# Patient Record
Sex: FEMALE | Race: WHITE | Employment: FULL TIME | URBAN - METROPOLITAN AREA
[De-identification: names, ages, dates, MRNs, and addresses within clinical notes are randomized per-mention and may not be internally consistent; named-entity substitution may affect disease eponyms.]

---

## 2017-01-02 DIAGNOSIS — R59.0 CERVICAL ADENOPATHY: Primary | ICD-10-CM

## 2017-01-02 DIAGNOSIS — R89.6 ABNORMAL CYTOLOGY: ICD-10-CM

## 2017-01-02 DIAGNOSIS — C73 MALIGNANT NEOPLASM OF THYROID GLAND (H): Primary | ICD-10-CM

## 2017-03-13 ENCOUNTER — OFFICE VISIT (OUTPATIENT)
Dept: OTOLARYNGOLOGY | Facility: CLINIC | Age: 42
End: 2017-03-13

## 2017-03-13 VITALS — HEIGHT: 63 IN | WEIGHT: 151 LBS | BODY MASS INDEX: 26.75 KG/M2

## 2017-03-13 DIAGNOSIS — E04.2 NONTOXIC MULTINODULAR GOITER: Primary | ICD-10-CM

## 2017-03-13 ASSESSMENT — PAIN SCALES - GENERAL: PAINLEVEL: NO PAIN (0)

## 2017-03-13 NOTE — MR AVS SNAPSHOT
After Visit Summary   3/13/2017    Michelle Jalloh    MRN: [id number]           Patient Information     Date Of Birth          1975        Visit Information        Provider Department      3/13/2017 2:00 PM Marisa Enamorado MD [facility] Ear Nose and Throat        Today's Diagnoses     Nontoxic multinodular goiter    -  1      Care Instructions    Nurse teaching given on thyroid lobectomy and the patient expresses understanding and acceptance of instructions. Juan A M Ti 3/13/2017 2:49 PM        Follow-ups after your visit        Who to contact     Please call your clinic at [phone] to:    Ask questions about your health    Make or cancel appointments    Discuss your medicines    Learn about your test results    Speak to your doctor   If you have compliments or concerns about an experience at your clinic, or if you wish to file a complaint, please contact [facility] Patient Relations at [phone] or email us at [email or web address]         Additional Information About Your Visit        MyChart Information     [employer] gives you secure access to your electronic health record. If you see a primary care provider, you can also send messages to your care team and make appointments. If you have questions, please call your primary care clinic.  If you do not have a primary care provider, please call [phone] and they will assist you.      [employer] is an electronic gateway that provides easy, online access to your medical records. With [employer], you can request a clinic appointment, read your test results, renew a prescription or communicate with your care team.     To access your existing account, please contact your [facility] or call [phone] for assistance.        Care EveryWhere ID     This is your Care EveryWhere ID. This could be used by other organizations to access your [facility]  "records  [id number]        Your Vitals Were     Height BMI (Body Mass Index)                1.61 m (5' 3.39\") 26.42 kg/m2           Blood Pressure from Last 3 Encounters:   12/20/16 116/75   01/29/16 101/63   08/29/13 108/68    Weight from Last 3 Encounters:   03/13/17 68.5 kg (151 lb)   12/20/16 69.1 kg (152 lb 4.8 oz)   01/29/16 64.9 kg (143 lb)              We Performed the Following     Bhavna-Operative Worksheet (Head & Neck)        Primary Care Provider Office Phone # Fax #    Tana Kam PA-C [phone]       [facility] [address] [facility] [address]        Thank you!     Thank you for choosing [facility] EAR NOSE AND THROAT  for your care. Our goal is always to provide you with excellent care. Hearing back from our patients is one way we can continue to improve our services. Please take a few minutes to complete the written survey that you may receive in the mail after your visit with us. Thank you!             Your Updated Medication List - Protect others around you: Learn how to safely use, store and throw away your medicines at www.disposemymeds.org.          This list is accurate as of: 3/13/17 11:59 PM.  Always use your most recent med list.                   Brand Name Dispense Instructions for use    clobetasol 0.05 % external solution    TEMOVATE     Reported on 3/13/2017         "

## 2017-03-13 NOTE — LETTER
3/13/2017     RE: Michelle Jalloh  [address] [facility] [address]     Dear Colleague,    Thank you for referring your patient, Michelle Jalloh, to the [facility] E[facility] AND THROAT at [facility]. Please see a copy of my visit note below.    REASON FOR CONSULTATION:  I was asked by Dr. Latisha Rdz to see this patient for surgical options for a suspicious thyroid nodule on the thyroid gland suspicious for papillary thyroid carcinoma.      HISTORY OF PRESENT ILLNESS:  This is a 41-year-old female who has a pertinent positive family history for hyperthyroidism.  She has been evaluated in the past by an outside endocrinologist and recently saw Dr. Rdz.  The patient in the past has had a 4-5 mm thyroid nodule that has not biopsied.  On the recent evaluation by Dr. Rdz, the imaging of the ultrasound in 08/2016 identified a 0.5 x 0.4 x 0.4 cm nodule that had irregular borders.  Because of the concern of the borders, the patient had an ultrasound-guided biopsy in 12/2016.  The biopsy of that single nodule was read as suspicious for papillary thyroid carcinoma.  The patient's thyroid function tests from 12/2016 reveal she is euthyroid.  Calcitonin is normal.  She then underwent a lateral neck ultrasound and biopsy of level 2 and level 4 lymph nodes that were benign.      Regarding the thyroid nodule, she has no problems with voice quality, inspiration or swallowing.  She has no head and neck radiation exposure.  No family history of thyroid carcinoma.  She does again have family history of her mother having hyperthyroidism.      PAST MEDICAL HISTORY, SURGICAL HISTORY AND MEDICATIONS:  Have been reviewed and are available in the EMR.      REVIEW OF SYSTEMS:  A 10-point review of systems is pertinent for that noted in the HPI.      PHYSICAL EXAMINATION:  On physical examination, her neck is soft.  I essentially cannot palpate any masses within the thyroid  gland.  The superior and inferior borders of the thyroid gland are palpable with no cervical lymphadenopathy on palpation.      LABORATORY DATA:  As stated above includes a TSH at 1.22.  Ultrasound and fine needle aspirations are discussed in the HPI.      ASSESSMENT:  Right isthmus nodule suspicious for papillary thyroid carcinoma.      PLAN:  Based on this, I would recommend the patient undergo a right thyroid lobectomy and isthmusectomy with intraoperative frozen sections.  If there is carcinoma within this nodule, the options would include proceeding with a completion thyroidectomy versus right thyroid lobectomy and isthmusectomy only.  Because of the size of the nodule, it is reasonable to only perform a lobectomy and include the isthmus with clear margins.  I discussed this at length with the patient.  I do not feel a central neck dissection is necessary.  The risks of the procedure including but not limited to bleeding, infection, injury to the recurrent laryngeal nerve or nerves, potential permanent hypocalcemia were all discussed with the patient.  She will contact my office to schedule surgery accordingly.         DILIP CARLSON MD        D: 2017 15:11   T: 2017 07:57   MT: LUDA    Name:     GRETA BARRAZA   MRN:      [id number]        Account:      [id number]   :      1975           Service Date: 2017    Document: [id number]

## 2017-03-13 NOTE — PATIENT INSTRUCTIONS
Nurse teaching given on thyroid lobectomy and the patient expresses understanding and acceptance of instructions. Juan A Luke 3/13/2017 2:49 PM

## 2017-03-13 NOTE — NURSING NOTE
Chief Complaint   Patient presents with     Consult     possible thyroidectomy     Home Hernández LPN

## 2017-03-13 NOTE — NURSING NOTE
Relevant Diagnosis: thyroid goiter   Teaching Topic: right thyroid lobectomy   Person(s) involved in teaching: Patient     Teaching Concerns Addressed:  Pre op teaching included the need for an H&P, NPO status pre op, hospital routines, expected recovery, activity  restrictions, antimicrobial scrub, s/s of infection, pain control methods and the importance of follow up appointments.  The patient voiced an understanding of all instructions and will call with questions.     Motivation Level:  Asks Questions:   Yes  Eager to Learn:   Yes  Cooperative:   Yes  Receptive (willing/able to accept information):   Yes     Patient  demonstrates understanding of the following:  Reason for the appointment, diagnosis and treatment plan:   Yes  Knowledge of proper use of medications and conditions for which they are ordered (with special attention to potential side effects or drug interactions):   Yes  Which situations necessitate calling provider and whom to contact:   Yes        Proper use and care of  (medical equip, care aids, etc.):   NA  Nutritional needs and diet plan:   Yes  Pain management techniques:   Yes  Patient instructed on hand hygiene:  Yes  How and/when to access community resources:   NA     Infection Prevention:  Patient   demonstrates understanding of the following:  Surgical procedure site care taught   Signs and symptoms of infection taught Yes  Wound care taught Yes     Instructional Materials Used/Given: Pre op booklet.

## 2017-03-22 NOTE — PROGRESS NOTES
REASON FOR CONSULTATION:  I was asked by Dr. Latisha Rdz to see this patient for surgical options for a suspicious thyroid nodule on the thyroid gland suspicious for papillary thyroid carcinoma.      HISTORY OF PRESENT ILLNESS:  This is a 41-year-old female who has a pertinent positive family history for hyperthyroidism.  She has been evaluated in the past by an outside endocrinologist and recently saw Dr. Rdz.  The patient in the past has had a 4-5 mm thyroid nodule that has not biopsied.  On the recent evaluation by Dr. Rdz, the imaging of the ultrasound in 08/2016 identified a 0.5 x 0.4 x 0.4 cm nodule that had irregular borders.  Because of the concern of the borders, the patient had an ultrasound-guided biopsy in 12/2016.  The biopsy of that single nodule was read as suspicious for papillary thyroid carcinoma.  The patient's thyroid function tests from 12/2016 reveal she is euthyroid.  Calcitonin is normal.  She then underwent a lateral neck ultrasound and biopsy of level 2 and level 4 lymph nodes that were benign.      Regarding the thyroid nodule, she has no problems with voice quality, inspiration or swallowing.  She has no head and neck radiation exposure.  No family history of thyroid carcinoma.  She does again have family history of her mother having hyperthyroidism.      PAST MEDICAL HISTORY, SURGICAL HISTORY AND MEDICATIONS:  Have been reviewed and are available in the EMR.      REVIEW OF SYSTEMS:  A 10-point review of systems is pertinent for that noted in the HPI.      PHYSICAL EXAMINATION:  On physical examination, her neck is soft.  I essentially cannot palpate any masses within the thyroid gland.  The superior and inferior borders of the thyroid gland are palpable with no cervical lymphadenopathy on palpation.      LABORATORY DATA:  As stated above includes a TSH at 1.22.  Ultrasound and fine needle aspirations are discussed in the HPI.      ASSESSMENT:  Right isthmus nodule  suspicious for papillary thyroid carcinoma.      PLAN:  Based on this, I would recommend the patient undergo a right thyroid lobectomy and isthmusectomy with intraoperative frozen sections.  If there is carcinoma within this nodule, the options would include proceeding with a completion thyroidectomy versus right thyroid lobectomy and isthmusectomy only.  Because of the size of the nodule, it is reasonable to only perform a lobectomy and include the isthmus with clear margins.  I discussed this at length with the patient.  I do not feel a central neck dissection is necessary.  The risks of the procedure including but not limited to bleeding, infection, injury to the recurrent laryngeal nerve or nerves, potential permanent hypocalcemia were all discussed with the patient.  She will contact my office to schedule surgery accordingly.         DILIP CARLSON MD             D: 2017 15:11   T: 2017 07:57   MT: LUDA      Name:     GRETA BARRAZA   MRN:      [id number]        Account:      [id number]   :      1975           Service Date: 2017      Document: [id number]

## 2017-05-18 NOTE — PROGRESS NOTES
SUBJECTIVE:                                                    Michelle Jalloh is a 42 year old female who presents to clinic today for the following health issues:      Breast lump left breast         Problem list and histories reviewed & adjusted, as indicated.  Additional history: as documented    Patient Active Problem List   Diagnosis     CARDIOVASCULAR SCREENING; LDL GOAL LESS THAN 160     Family history of thyroid disease     Common wart     Lymph node enlargement     Discoid lupus     Nontoxic multinodular goiter     Anxiety state     Past Surgical History:   Procedure Laterality Date     APPENDECTOMY  18      SECTION         Social History   Substance Use Topics     Smoking status: Former Smoker     Start date: 1999     Smokeless tobacco: Never Used     Alcohol use 0.0 oz/week     0 Standard drinks or equivalent per week     Family History   Problem Relation Age of Onset     Hypertension Father      Hyperthyroidism Mother      Thyroid     Hypertension Paternal Grandmother      Colon Cancer No family hx of      Breast Cancer No family hx of      Thyroid Cancer No family hx of          No current outpatient prescriptions on file.     BP Readings from Last 3 Encounters:   17 122/64   16 116/75   16 101/63    Wt Readings from Last 3 Encounters:   17 148 lb (67.1 kg)   17 151 lb (68.5 kg)   16 152 lb 4.8 oz (69.1 kg)                  Labs reviewed in EPIC    Reviewed and updated as needed this visit by clinical staff       Reviewed and updated as needed this visit by Provider         ROS:  This 42 year old female is here today because she has been aware of a left breast mass for about the past 6 weeks. It has gotten a little tender around her ovulation time and again with menses. She has never had a mammogram. She is still nursing her toddler about 3-4 X a day. He will be 3 this summer. Patient is a professor at the [employer]. She and  " are using condoms faithfully.  She doesn't smoke. All other review of systems are negative  Personal, family, and social history reviewed with patient and revised.         OBJECTIVE:                                                    /64 (BP Location: Right arm, Patient Position: Chair, Cuff Size: Adult Regular)  Pulse 66  Temp 97.9  F (36.6  C)  Ht 5' 3.5\" (1.613 m)  Wt 148 lb (67.1 kg)  SpO2 100%  BMI 25.81 kg/m2  Body mass index is 25.81 kg/(m^2).  Right breast and axillary area are normal on exam  Left axillary area is normal  Left breast has a silver dollar sized fullness above her areola at the noon to 1 O'clock position.   It is not tender. This needs further evaluation with imaging and possible biopsy   No redness noted. No dimpling of her breast.   Diagnostic Test Results:  none      ASSESSMENT/PLAN:                                                             1. Lump or mass in breast  As above   - MA Diagnostic Digital Bilateral; Future  - US Breast Left Complete 4 Quadrants; Future    2. Need for prophylactic vaccination and inoculation against viral hepatitis  due  - HEPATITIS A VACCINE (ADULT)    Return to clinic as needed     PATO CASTRO MD  [facility]  "

## 2017-05-24 ENCOUNTER — OFFICE VISIT (OUTPATIENT)
Dept: FAMILY MEDICINE | Facility: CLINIC | Age: 42
End: 2017-05-24
Payer: COMMERCIAL

## 2017-05-24 VITALS
TEMPERATURE: 97.9 F | SYSTOLIC BLOOD PRESSURE: 122 MMHG | OXYGEN SATURATION: 100 % | WEIGHT: 148 LBS | HEART RATE: 66 BPM | BODY MASS INDEX: 25.27 KG/M2 | DIASTOLIC BLOOD PRESSURE: 64 MMHG | HEIGHT: 64 IN

## 2017-05-24 DIAGNOSIS — N63.0 LUMP OR MASS IN BREAST: Primary | ICD-10-CM

## 2017-05-24 DIAGNOSIS — Z23 NEED FOR PROPHYLACTIC VACCINATION AND INOCULATION AGAINST VIRAL HEPATITIS: ICD-10-CM

## 2017-05-24 PROCEDURE — 90632 HEPA VACCINE ADULT IM: CPT | Performed by: FAMILY MEDICINE

## 2017-05-24 PROCEDURE — 90471 IMMUNIZATION ADMIN: CPT | Performed by: FAMILY MEDICINE

## 2017-05-24 PROCEDURE — 99213 OFFICE O/P EST LOW 20 MIN: CPT | Mod: 25 | Performed by: FAMILY MEDICINE

## 2017-05-24 NOTE — PATIENT INSTRUCTIONS
[facility]    If you have any questions regarding to your visit please contact your care team:       Team Purple:   Clinic Hours Telephone Number   [address] Dr.   7am-7pm  Monday - Thursday   7am-5pm  Fridays  [phone]  (Appointment scheduling available 24/7)    Questions about your Visit?   Team Line:  [phone]   Urgent Care - [place] and [facility] - 11am-9pm Monday-Friday Saturday-Sunday- 9am-5pm   [place] - 5pm-9pm Monday-Friday Saturday-Sunday- 9am-5pm  [phone] - [facility]  [phone] - [place]       What options do I have for visits at the clinic other than the traditional office visit?  To expand how we care for you, many of our providers are utilizing electronic visits (e-visits) and telephone visits, when medically appropriate, for interactions with their patients rather than a visit in the clinic.   We also offer nurse visits for many medical concerns. Just like any other service, we will bill your insurance company for this type of visit based on time spent on the phone with your provider. Not all insurance companies cover these visits. Please check with your medical insurance if this type of visit is covered. You will be responsible for any charges that are not paid by your insurance.      E-visits via [employer]:  generally incur a $35.00 fee.  Telephone visits:  Time spent on the phone: *charged based on time that is spent on the phone in increments of 10 minutes. Estimated cost:   5-10 mins $30.00   11-20 mins. $59.00   21-30 mins. $85.00     Use [employer] (secure email communication and access to your chart) to send your primary care provider a message or make an appointment. Ask someone on your Team how to sign up for [employer].  For a Price Quote for your services, please call our Consumer Price Line at [phone].  As always, Thank you for trusting us with your health care  needs!    [facility]    If you have any questions regarding to your visit please contact your care team:       Team Purple:   Clinic Hours Telephone Number   [address] Dr.   7am-7pm  Monday - Thursday   7am-5pm  Fridays  [phone]  (Appointment scheduling available 24/7)    Questions about your Visit?   Team Line:  [phone]   Urgent Care - [place] and [facility] - 11am-9pm Monday-Friday Saturday-Sunday- 9am-5pm   [place] - 5pm-9pm Monday-Friday Saturday-Sunday- 9am-5pm  [phone] - [facility]  [phone] - [place]       What options do I have for visits at the clinic other than the traditional office visit?  To expand how we care for you, many of our providers are utilizing electronic visits (e-visits) and telephone visits, when medically appropriate, for interactions with their patients rather than a visit in the clinic.   We also offer nurse visits for many medical concerns. Just like any other service, we will bill your insurance company for this type of visit based on time spent on the phone with your provider. Not all insurance companies cover these visits. Please check with your medical insurance if this type of visit is covered. You will be responsible for any charges that are not paid by your insurance.      E-visits via [employer]:  generally incur a $35.00 fee.  Telephone visits:  Time spent on the phone: *charged based on time that is spent on the phone in increments of 10 minutes. Estimated cost:   5-10 mins $30.00   11-20 mins. $59.00   21-30 mins. $85.00     Use [employer] (secure email communication and access to your chart) to send your primary care provider a message or make an appointment. Ask someone on your Team how to sign up for [employer].  For a Price Quote for your services, please call our Consumer Price Line at [phone].  As always, Thank you for trusting us with your health care  needs!

## 2017-05-24 NOTE — MR AVS SNAPSHOT
After Visit Summary   5/24/2017    Michelle Jalloh    MRN: [id number]           Patient Information     Date Of Birth          1975        Visit Information        Provider Department      5/24/2017 10:30 AM Mercedez Castanon MD [facility]        Today's Diagnoses     Lump or mass in breast    -  1    Need for prophylactic vaccination and inoculation against viral hepatitis          Care Instructions    [facility]    If you have any questions regarding to your visit please contact your care team:       Team Purple:   Clinic Hours Telephone Number   DAYAMI Ojeda Dr., Dr.   7am-7pm  Monday - Thursday   7am-5pm  Fridays  [phone]  (Appointment scheduling available 24/7)    Questions about your Visit?   Team Line:  [phone]   Urgent Care - [place] - 11am-9pm Monday-Friday Saturday-Sunday- 9am-5pm   [place] - 5pm-9pm Monday-Friday Saturday-Sunday- 9am-5pm  [phone] - [facility]  [phone] - [place]       What options do I have for visits at the clinic other than the traditional office visit?  To expand how we care for you, many of our providers are utilizing electronic visits (e-visits) and telephone visits, when medically appropriate, for interactions with their patients rather than a visit in the clinic.   We also offer nurse visits for many medical concerns. Just like any other service, we will bill your insurance company for this type of visit based on time spent on the phone with your provider. Not all insurance companies cover these visits. Please check with your medical insurance if this type of visit is covered. You will be responsible for any charges that are not paid by your insurance.      E-visits via [employer]:  generally incur a $35.00 fee.  Telephone visits:  Time spent on the phone: *charged based on time that is spent on the phone in increments of 10  minutes. Estimated cost:   5-10 mins $30.00   11-20 mins. $59.00   21-30 mins. $85.00     Use NaPopravkuhart (secure email communication and access to your chart) to send your primary care provider a message or make an appointment. Ask someone on your Team how to sign up for FitBarkt.  For a Price Quote for your services, please call our Consumer Price Line at [phone].  As always, Thank you for trusting us with your health care needs!    [facility]    If you have any questions regarding to your visit please contact your care team:       Team Purple:   Clinic Hours Telephone Number   DAYAMI Ojeda Dr., Dr.   7am-7pm  Monday - Thursday   7am-5pm  Fridays  [phone]  (Appointment scheduling available 24/7)    Questions about your Visit?   Team Line:  [phone]   Urgent Care - [place] and [place] [facility] [place] - 11am-9pm Monday-Friday Saturday-Sunday- 9am-5pm   [place] - 5pm-9pm Monday-Friday Saturday-Sunday- 9am-5pm  [phone] - [place]   [phone] - [place]       What options do I have for visits at the clinic other than the traditional office visit?  To expand how we care for you, many of our providers are utilizing electronic visits (e-visits) and telephone visits, when medically appropriate, for interactions with their patients rather than a visit in the clinic.   We also offer nurse visits for many medical concerns. Just like any other service, we will bill your insurance company for this type of visit based on time spent on the phone with your provider. Not all insurance companies cover these visits. Please check with your medical insurance if this type of visit is covered. You will be responsible for any charges that are not paid by your insurance.      E-visits via Na[employer]uhart:  generally incur a $35.00 fee.  Telephone visits:  Time spent on the phone: *charged based on time that is spent on the phone in increments of 10  minutes. Estimated cost:   5-10 mins $30.00   11-20 mins. $59.00   21-30 mins. $85.00     Use Tinitellhart (secure email communication and access to your chart) to send your primary care provider a message or make an appointment. Ask someone on your Team how to sign up for [employer].  For a Price Quote for your services, please call our [employer] Price Line at [phone].  As always, Thank you for trusting us with your health care needs!              Follow-ups after your visit        Your next 10 appointments already scheduled     Jul 25, 2017   Procedure with Marisa Enamorado MD   [facility] ([facility])    [address] [place] [address]  5th Floor  [facility] [address] [phone]           Located in the Clinics and Surgery Center at [address] [place] [address].   parking is very convenient and highly recommended.  is a $6 flat rate fee.  Both  and self parkers should enter the main arrival plaza from [facility]; parking attendants will direct you based on your parking preference.            Aug 14, 2017  1:30 PM CDT   (Arrive by 1:15 PM)   Return Visit with Marisa Enamorado MD   [facility] ([facility])    [address] [place] [address]  4th Floor  [facility] [address] [phone]              Future tests that were ordered for you today     Open Future Orders        Priority Expected Expires Ordered    [place] Diagnostic Digital Bilateral Routine  8/22/2017 5/24/2017    US Breast Left Complete 4 Quadrants Routine  5/24/2018 5/24/2017            Who to contact     If you have questions or need follow up information about today's clinic visit or your schedule please contact [facility] directly at [phone].  Normal or non-critical lab and imaging results will be communicated to you by Tinitellhart, letter or phone within 4 business days after the clinic has received the results. If you  "do not hear from us within 7 days, please contact the clinic through baseclick or phone. If you have a critical or abnormal lab result, we will notify you by phone as soon as possible.  Submit refill requests through baseclick or call your pharmacy and they will forward the refill request to us. Please allow 3 business days for your refill to be completed.          Additional Information About Your Visit        CommitChangeharApiary Information     baseclick gives you secure access to your electronic health record. If you see a primary care provider, you can also send messages to your care team and make appointments. If you have questions, please call your primary care clinic.  If you do not have a primary care provider, please call [phone] and they will assist you.        Care EveryWhere ID     This is your Care EveryWhere ID. This could be used by other organizations to access your [facility] medical records  [id number]        Your Vitals Were     Pulse Temperature Height Pulse Oximetry BMI (Body Mass Index)       66 97.9  F (36.6  C) 5' 3.5\" (1.613 m) 100% 25.81 kg/m2        Blood Pressure from Last 3 Encounters:   05/24/17 122/64   12/20/16 116/75   01/29/16 101/63    Weight from Last 3 Encounters:   05/24/17 148 lb (67.1 kg)   03/13/17 151 lb (68.5 kg)   12/20/16 152 lb 4.8 oz (69.1 kg)              We Performed the Following     HEPATITIS A VACCINE (ADULT)        Primary Care Provider Office Phone # Fax #    Bijal Jaimes [phone]       [facility] [address] [facility] [address]        Thank you!     Thank you for choosing [facility]  for your care. Our goal is always to provide you with excellent care. Hearing back from our patients is one way we can continue to improve our services. Please take a few minutes to complete the written survey that you may receive in the mail after your visit with us. Thank you!             Your Updated Medication List - Protect others " around you: Learn how to safely use, store and throw away your medicines at www.disposemymeds.org.      Notice  As of 5/24/2017 11:06 AM    You have not been prescribed any medications.

## 2017-05-24 NOTE — NURSING NOTE
"Chief Complaint   Patient presents with     Breast Mass     lump in breast       Initial /64 (BP Location: Right arm, Patient Position: Chair, Cuff Size: Adult Regular)  Pulse 66  Temp 97.9  F (36.6  C)  Ht 5' 3.5\" (1.613 m)  Wt 148 lb (67.1 kg)  SpO2 100%  BMI 25.81 kg/m2 Estimated body mass index is 25.81 kg/(m^2) as calculated from the following:    Height as of this encounter: 5' 3.5\" (1.613 m).    Weight as of this encounter: 148 lb (67.1 kg).  Medication Reconciliation: complete   Theresa Barajas MA      "

## 2017-05-26 ENCOUNTER — RADIANT APPOINTMENT (OUTPATIENT)
Dept: MAMMOGRAPHY | Facility: CLINIC | Age: 42
End: 2017-05-26

## 2017-05-26 DIAGNOSIS — N63.0 LUMP OR MASS IN BREAST: ICD-10-CM

## 2017-06-08 ENCOUNTER — OFFICE VISIT (OUTPATIENT)
Dept: FAMILY MEDICINE | Facility: CLINIC | Age: 42
End: 2017-06-08
Payer: COMMERCIAL

## 2017-06-08 VITALS
HEIGHT: 64 IN | TEMPERATURE: 97.2 F | OXYGEN SATURATION: 99 % | WEIGHT: 148 LBS | BODY MASS INDEX: 25.27 KG/M2 | DIASTOLIC BLOOD PRESSURE: 70 MMHG | HEART RATE: 53 BPM | SYSTOLIC BLOOD PRESSURE: 111 MMHG

## 2017-06-08 DIAGNOSIS — Z01.818 PREOP GENERAL PHYSICAL EXAM: Primary | ICD-10-CM

## 2017-06-08 DIAGNOSIS — E04.2 NONTOXIC MULTINODULAR GOITER: ICD-10-CM

## 2017-06-08 LAB — HGB BLD-MCNC: 13.5 G/DL (ref 11.7–15.7)

## 2017-06-08 PROCEDURE — 85018 HEMOGLOBIN: CPT | Performed by: FAMILY MEDICINE

## 2017-06-08 PROCEDURE — 99214 OFFICE O/P EST MOD 30 MIN: CPT | Performed by: FAMILY MEDICINE

## 2017-06-08 PROCEDURE — 36415 COLL VENOUS BLD VENIPUNCTURE: CPT | Performed by: FAMILY MEDICINE

## 2017-06-08 NOTE — NURSING NOTE
"Chief Complaint   Patient presents with     Pre-Op Exam       Initial /70  Pulse 53  Temp 97.2  F (36.2  C) (Oral)  Ht 5' 3.5\" (1.613 m)  Wt 148 lb (67.1 kg)  LMP 06/03/2017  SpO2 99%  Breastfeeding? No  BMI 25.81 kg/m2 Estimated body mass index is 25.81 kg/(m^2) as calculated from the following:    Height as of this encounter: 5' 3.5\" (1.613 m).    Weight as of this encounter: 148 lb (67.1 kg).  Medication Reconciliation: complete   Nilda BIRMINGHAM MA      "

## 2017-06-08 NOTE — PATIENT INSTRUCTIONS
[facility]    If you have any questions regarding to your visit please contact your care team:       Team Red:   Clinic Hours Telephone Number   Dr. Yina Richmond, NP   7am-7pm  Monday - Thursday   7am-5pm  Fridays  [phone]  (Appointment scheduling available 24/7)    Questions about your visit?   Team Line  [phone]   Urgent Care - [place] - 11am-9pm Monday-Friday Saturday-Sunday- 9am-5pm   [place] - 5pm-9pm Monday-Friday Saturday-Sunday- 9am-5pm  [phone] - [place]   [phone] - [place]       What options do I have for visits at the clinic other than the traditional office visit?  To expand how we care for you, many of our providers are utilizing electronic visits (e-visits) and telephone visits, when medically appropriate, for interactions with their patients rather than a visit in the clinic.   We also offer nurse visits for many medical concerns. Just like any other service, we will bill your insurance company for this type of visit based on time spent on the phone with your provider. Not all insurance companies cover these visits. Please check with your medical insurance if this type of visit is covered. You will be responsible for any charges that are not paid by your insurance.      E-visits via [employer]:  generally incur a $35.00 fee.  Telephone visits:  Time spent on the phone: *charged based on time that is spent on the phone in increments of 10 minutes. Estimated cost:   5-10 mins $30.00   11-20 mins. $59.00   21-30 mins. $85.00     Use AMOtecht (secure email communication and access to your chart) to send your primary care provider a message or make an appointment. Ask someone on your Team how to sign up for [employer].  For a Price Quote for your services, please call our Consumer Price Line at [phone].      As always, Thank you for trusting us with your health care needs!  Lexx ANDERSON  [place]    Before Your Surgery      Call your surgeon if there is any change in your health. This includes signs of a cold or flu (such as a sore throat, runny nose, cough, rash or fever).    Do not smoke, drink alcohol or take over the counter medicine (unless your surgeon or primary care doctor tells you to) for the 24 hours before and after surgery.    If you take prescribed drugs: Follow your doctor s orders about which medicines to take and which to stop until after surgery.    Eating and drinking prior to surgery: follow the instructions from your surgeon    Take a shower or bath the night before surgery. Use the soap your surgeon gave you to gently clean your skin. If you do not have soap from your surgeon, use your regular soap. Do not shave or scrub the surgery site.  Wear clean pajamas and have clean sheets on your bed.

## 2017-06-08 NOTE — PROGRESS NOTES
[facility]  [address] [place] [address]  [phone]  Dept: [phone]    PRE-OP EVALUATION:  Today's date: 2017    Michelle Jalloh (: 1975) presents for pre-operative evaluation assessment as requested by Dr. Marisa Enamorado.  She requires evaluation and anesthesia risk assessment prior to undergoing surgery/procedure for treatment of thyroid .  Proposed procedure: Partial thyroidectomy     Date of Surgery/ Procedure: 17  Time of Surgery/ Procedure: 8:00am  [facility]   Fax number for surgical facility:   Primary Physician: Bijal Jaimes  Type of Anesthesia Anticipated: General    Patient has a Health Care Directive or Living Will:  NO    Preop Questions 2017   1.  Do you have a history of heart attack, stroke, stent, bypass or surgery on an artery in the head, neck, heart or legs? No   2.  Do you ever have any pain or discomfort in your chest? No   3.  Do you have a history of  Heart Failure? No   4.   Are you troubled by shortness of breath when:  walking on a level surface, or up a slight hill, or at night? No   5.  Do you currently have a cold, bronchitis or other respiratory infection? No   6.  Do you have a cough, shortness of breath, or wheezing? No   7.  Do you sometimes get pains in the calves of your legs when you walk? No   8. Do you or anyone in your family have previous history of blood clots? No   9.  Do you or does anyone in your family have a serious bleeding problem such as prolonged bleeding following surgeries or cuts? YES - Father-Low platelets/lymphoma   10. Have you ever had problems with anemia or been told to take iron pills? YES - As teenager   11. Have you had any abnormal blood loss such as black, tarry or bloody stools, or abnormal vaginal bleeding? No   12. Have you ever had a blood transfusion? No   13. Have you or any of your relatives ever had problems with anesthesia? No   14. Do you have sleep apnea,  excessive snoring or daytime drowsiness? No   15. Do you have any prosthetic heart valves? No   16. Do you have prosthetic joints? No   17. Is there any chance that you may be pregnant? No         HPI:                                                      Brief HPI related to upcoming procedure: Pt scheduled For above surgery as she has  suspicious thyroid nodule on the thyroid gland suspicious for papillary thyroid carcinoma.            MEDICAL HISTORY:                                                      Patient Active Problem List    Diagnosis Date Noted     Nontoxic multinodular goiter 2013     Priority: Medium     Lymph node enlargement 2013     Priority: Medium     Discoid lupus 2013     Priority: Medium     Common wart 2012     Priority: Medium     CARDIOVASCULAR SCREENING; LDL GOAL LESS THAN 160 2012     Priority: Medium     Family history of thyroid disease 2012     Priority: Medium     Anxiety state 2009     Priority: Medium      History reviewed. No pertinent past medical history.  Past Surgical History:   Procedure Laterality Date     APPENDECTOMY  18      SECTION       No current outpatient prescriptions on file.     OTC products: None, except as noted above    No Known Allergies   Latex Allergy: NO    Social History   Substance Use Topics     Smoking status: Former Smoker     Start date: 1999     Smokeless tobacco: Never Used     Alcohol use 0.0 oz/week     0 Standard drinks or equivalent per week     History   Drug Use No     Social History     Social History     Marital status:      Spouse name: N/A     Number of children: 1     Years of education: N/A     Occupational History     Artist      Social History Main Topics     Smoking status: Former Smoker     Start date: 1999     Smokeless tobacco: Never Used     Alcohol use 0.0 oz/week     0 Standard drinks or equivalent per week     Drug use: No     Sexual activity: Yes     Partners:  "Male     Other Topics Concern     Parent/Sibling W/ Cabg, Mi Or Angioplasty Before 65f 55m? No     Social History Narrative     Family History   Problem Relation Age of Onset     Hypertension Father      Hyperthyroidism Mother      Thyroid     Hypertension Paternal Grandmother      Colon Cancer No family hx of      Breast Cancer No family hx of      Thyroid Cancer No family hx of          REVIEW OF SYSTEMS:                                                    C: NEGATIVE for fever, chills, change in weight  I: NEGATIVE for worrisome rashes, moles or lesions  E: NEGATIVE for vision changes or irritation  E/M: NEGATIVE for ear, mouth and throat problems  R: NEGATIVE for significant cough or SOB  B: NEGATIVE for masses, tenderness or discharge  CV: NEGATIVE for chest pain, palpitations or peripheral edema  GI: NEGATIVE for nausea, abdominal pain, heartburn, or change in bowel habits  : NEGATIVE for frequency, dysuria, or hematuria  M: NEGATIVE for significant arthralgias or myalgia  N: NEGATIVE for weakness, dizziness or paresthesias  E: NEGATIVE for temperature intolerance, skin/hair changes  H: NEGATIVE for bleeding problems  P: NEGATIVE for changes in mood or affect    EXAM:                                                    /70  Pulse 53  Temp 97.2  F (36.2  C) (Oral)  Ht 5' 3.5\" (1.613 m)  Wt 148 lb (67.1 kg)  LMP 06/03/2017  SpO2 99%  Breastfeeding? No  BMI 25.81 kg/m2    GENERAL APPEARANCE: healthy, alert and no distress     EYES: EOMI, PERRL     HENT: ear canals and TM's normal and nose and mouth without ulcers or lesions     NECK: no adenopathy, no asymmetry, masses, or scars and thyroid normal to palpation     RESP: lungs clear to auscultation - no rales, rhonchi or wheezes     CV: regular rates and rhythm, normal S1 S2, no S3 or S4 and no murmur, click or rub     ABDOMEN:  soft, nontender, no HSM or masses and bowel sounds normal     MS: extremities normal- no gross deformities noted, no " evidence of inflammation in joints, FROM in all extremities.     SKIN: no suspicious lesions or rashes     NEURO: Normal strength and tone, sensory exam grossly normal, mentation intact and speech normal     PSYCH: mentation appears normal. and affect normal/bright     LYMPHATICS: No axillary, cervical, or supraclavicular nodes    DIAGNOSTICS:                                                    HGB pending     Recent Labs   Lab Test  07/08/13   0940   HGB  13.7   PLT  242        IMPRESSION:                                                    Reason for surgery/procedure: as above  Diagnosis/reason for consult: Preop    The proposed surgical procedure is considered INTERMEDIATE risk.    REVISED CARDIAC RISK INDEX  The patient has the following serious cardiovascular risks for perioperative complications such as (MI, PE, VFib and 3  AV Block):  No serious cardiac risks  INTERPRETATION: 0 risks: Class I (very low risk - 0.4% complication rate)    The patient has the following additional risks for perioperative complications:  No identified additional risks      ICD-10-CM    1. Preop general physical exam Z01.818 Hemoglobin   2. Nontoxic multinodular goiter E04.2        RECOMMENDATIONS:                                                        APPROVAL GIVEN to proceed with proposed procedure, without further diagnostic evaluation       Signed Electronically by: Bijal Jaimes MD    Copy of this evaluation report is provided to requesting physician.    [facility] Preop Guidelines

## 2017-06-08 NOTE — MR AVS SNAPSHOT
After Visit Summary   6/8/2017    Michelle Jalloh    MRN: [id number]           Patient Information     Date Of Birth          1975        Visit Information        Provider Department      6/8/2017 3:40 PM Bijal Jaimes MD [facility]        Today's Diagnoses     Preop general physical exam    -  1    Nontoxic multinodular goiter          Care Instructions    [facility]    If you have any questions regarding to your visit please contact your care team:       Team Red:   Clinic Hours Telephone Number   Dr. Yina Richmond, NP   7am-7pm  Monday - Thursday   7am-5pm  Fridays  [phone]  (Appointment scheduling available 24/7)    Questions about your visit?   Team Line  [phone]   [facility] [place] [facility] 11am-9pm Monday-Friday Saturday-Sunday- 9am-5pm   [place] - 5pm-9pm Monday-Friday Saturday-Sunday- 9am-5pm  [phone] - [place]   [phone] - [place]       What options do I have for visits at the clinic other than the traditional office visit?  To expand how we care for you, many of our providers are utilizing electronic visits (e-visits) and telephone visits, when medically appropriate, for interactions with their patients rather than a visit in the clinic.   We also offer nurse visits for many medical concerns. Just like any other service, we will bill your insurance company for this type of visit based on time spent on the phone with your provider. Not all insurance companies cover these visits. Please check with your medical insurance if this type of visit is covered. You will be responsible for any charges that are not paid by your insurance.      E-visits via [email or web address]:  generally incur a $35.00 fee.  Telephone visits:  Time spent on the phone: *charged based on time that is spent on the phone in increments of 10 minutes. Estimated cost:   5-10 mins $30.00   11-20 mins.  $59.00   21-30 mins. $85.00     Use Finelinehart (secure email communication and access to your chart) to send your primary care provider a message or make an appointment. Ask someone on your Team how to sign up for [employer].  For a Price Quote for your services, please call our [employer] Price Line at [phone].      As always, Thank you for trusting us with your health care needs!  Lexx Escalante    Before Your Surgery      Call your surgeon if there is any change in your health. This includes signs of a cold or flu (such as a sore throat, runny nose, cough, rash or fever).    Do not smoke, drink alcohol or take over the counter medicine (unless your surgeon or primary care doctor tells you to) for the 24 hours before and after surgery.    If you take prescribed drugs: Follow your doctor s orders about which medicines to take and which to stop until after surgery.    Eating and drinking prior to surgery: follow the instructions from your surgeon    Take a shower or bath the night before surgery. Use the soap your surgeon gave you to gently clean your skin. If you do not have soap from your surgeon, use your regular soap. Do not shave or scrub the surgery site.  Wear clean pajamas and have clean sheets on your bed.           Follow-ups after your visit        Your next 10 appointments already scheduled     Jun 27, 2017   Procedure with Marisa Enamorado MD   [facility] ([facility])    [address] [place] [address]   [phone]           Located in the Clinics and Surgery Center at [address] [place] [address].   parking is very convenient and highly recommended.  is a $6 flat rate fee.  Both  and self parkers should enter the main arrival plaza from [facility]; parking attendants will direct you based on your parking preference.            Jul 21, 2017 10:00 AM CDT   Return Visit with Marisa Degroot  "MD Kelsea   [facility] ([facility]    [address] [address] [place] [address] [phone]              Who to contact     If you have questions or need follow up information about today's clinic visit or your schedule please contact [facility] directly at [phone].  Normal or non-critical lab and imaging results will be communicated to you by MyChart, letter or phone within 4 business days after the clinic has received the results. If you do not hear from us within 7 days, please contact the clinic through Medabilhart or phone. If you have a critical or abnormal lab result, we will notify you by phone as soon as possible.  Submit refill requests through [employer] or call your pharmacy and they will forward the refill request to us. Please allow 3 business days for your refill to be completed.          Additional Information About Your Visit        Medabilhart Information     [employer] gives you secure access to your electronic health record. If you see a primary care provider, you can also send messages to your care team and make appointments. If you have questions, please call your primary care clinic.  If you do not have a primary care provider, please call [phone] and they will assist you.        Care EveryWhere ID     This is your Care EveryWhere ID. This could be used by other organizations to access your [place] medical records  [id number]        Your Vitals Were     Pulse Temperature Height Last Period Pulse Oximetry Breastfeeding?    53 97.2  F (36.2  C) (Oral) 5' 3.5\" (1.613 m) 06/03/2017 99% No    BMI (Body Mass Index)                   25.81 kg/m2            Blood Pressure from Last 3 Encounters:   06/08/17 111/70   05/24/17 122/64   12/20/16 116/75    Weight from Last 3 Encounters:   06/08/17 148 lb (67.1 kg)   05/24/17 148 lb (67.1 kg)   03/13/17 151 lb (68.5 kg)              We Performed the Following     Hemoglobin        Primary Care " Provider Office Phone # Fax #    Bijal Jaimes [phone]       [address]  [facility] [address]        Thank you!     Thank you for choosing [facility]  for your care. Our goal is always to provide you with excellent care. Hearing back from our patients is one way we can continue to improve our services. Please take a few minutes to complete the written survey that you may receive in the mail after your visit with us. Thank you!             Your Updated Medication List - Protect others around you: Learn how to safely use, store and throw away your medicines at www.disposemymeds.org.      Notice  As of 6/8/2017  4:10 PM    You have not been prescribed any medications.

## 2017-06-09 ENCOUNTER — TELEPHONE (OUTPATIENT)
Dept: SURGERY | Facility: CLINIC | Age: 42
End: 2017-06-09

## 2017-06-09 NOTE — TELEPHONE ENCOUNTER
[facility] Call Center    Phone Message    Name of Caller: Michelle    Phone Number: Cell number on file:    Telephone Information:   Mobile [phone]       Best time to return call: ANY    May a detailed message be left on voicemail: yes    Relation to patient: Self    Reason for Call: Other: Michelle is going into [facility] for labs and is hoping to receive a list of the labs she will need for her procedure, so she does not double up on labs that she will already be having.     Action Taken: Message routed to:  Adult Clinics: Surgery, General p [phone]

## 2017-06-09 NOTE — TELEPHONE ENCOUNTER
Unable to contact the patient by phone. A generic message was left encouraging the patient to contact the surgery scheduler at the [facility] to see what specific labs are needed.    Shirley Huff CMA

## 2017-06-26 ENCOUNTER — ANESTHESIA EVENT (OUTPATIENT)
Dept: SURGERY | Facility: AMBULATORY SURGERY CENTER | Age: 42
End: 2017-06-26

## 2017-06-27 ENCOUNTER — HOSPITAL ENCOUNTER (OUTPATIENT)
Facility: AMBULATORY SURGERY CENTER | Age: 42
End: 2017-06-27
Attending: SURGERY

## 2017-06-27 ENCOUNTER — ANESTHESIA (OUTPATIENT)
Dept: SURGERY | Facility: AMBULATORY SURGERY CENTER | Age: 42
End: 2017-06-27

## 2017-06-27 VITALS
TEMPERATURE: 98 F | BODY MASS INDEX: 24.92 KG/M2 | WEIGHT: 146 LBS | SYSTOLIC BLOOD PRESSURE: 118 MMHG | DIASTOLIC BLOOD PRESSURE: 80 MMHG | OXYGEN SATURATION: 97 % | HEIGHT: 64 IN | HEART RATE: 52 BPM | RESPIRATION RATE: 16 BRPM

## 2017-06-27 DIAGNOSIS — Z98.890 S/P THYROID SURGERY: Primary | ICD-10-CM

## 2017-06-27 LAB
HCG UR QL: NEGATIVE
INTERNAL QC OK POCT: YES

## 2017-06-27 RX ORDER — PROPOFOL 10 MG/ML
INJECTION, EMULSION INTRAVENOUS PRN
Status: DISCONTINUED | OUTPATIENT
Start: 2017-06-27 | End: 2017-06-27

## 2017-06-27 RX ORDER — SODIUM CHLORIDE, SODIUM LACTATE, POTASSIUM CHLORIDE, CALCIUM CHLORIDE 600; 310; 30; 20 MG/100ML; MG/100ML; MG/100ML; MG/100ML
INJECTION, SOLUTION INTRAVENOUS CONTINUOUS
Status: DISCONTINUED | OUTPATIENT
Start: 2017-06-27 | End: 2017-06-28 | Stop reason: HOSPADM

## 2017-06-27 RX ORDER — FENTANYL CITRATE 50 UG/ML
INJECTION, SOLUTION INTRAMUSCULAR; INTRAVENOUS PRN
Status: DISCONTINUED | OUTPATIENT
Start: 2017-06-27 | End: 2017-06-27

## 2017-06-27 RX ORDER — HYDROCODONE BITARTRATE AND ACETAMINOPHEN 5; 325 MG/1; MG/1
1-2 TABLET ORAL EVERY 4 HOURS PRN
Qty: 30 TABLET | Refills: 0 | Status: SHIPPED | OUTPATIENT
Start: 2017-06-27 | End: 2017-07-21

## 2017-06-27 RX ORDER — ONDANSETRON 4 MG/1
4 TABLET, ORALLY DISINTEGRATING ORAL EVERY 30 MIN PRN
Status: DISCONTINUED | OUTPATIENT
Start: 2017-06-27 | End: 2017-06-28 | Stop reason: HOSPADM

## 2017-06-27 RX ORDER — MEPERIDINE HYDROCHLORIDE 25 MG/ML
12.5 INJECTION INTRAMUSCULAR; INTRAVENOUS; SUBCUTANEOUS
Status: DISCONTINUED | OUTPATIENT
Start: 2017-06-27 | End: 2017-06-28 | Stop reason: HOSPADM

## 2017-06-27 RX ORDER — ONDANSETRON 2 MG/ML
INJECTION INTRAMUSCULAR; INTRAVENOUS PRN
Status: DISCONTINUED | OUTPATIENT
Start: 2017-06-27 | End: 2017-06-27

## 2017-06-27 RX ORDER — FENTANYL CITRATE 50 UG/ML
25-50 INJECTION, SOLUTION INTRAMUSCULAR; INTRAVENOUS
Status: DISCONTINUED | OUTPATIENT
Start: 2017-06-27 | End: 2017-06-27 | Stop reason: HOSPADM

## 2017-06-27 RX ORDER — ONDANSETRON 2 MG/ML
4 INJECTION INTRAMUSCULAR; INTRAVENOUS EVERY 30 MIN PRN
Status: DISCONTINUED | OUTPATIENT
Start: 2017-06-27 | End: 2017-06-28 | Stop reason: HOSPADM

## 2017-06-27 RX ORDER — FENTANYL CITRATE 50 UG/ML
25-50 INJECTION, SOLUTION INTRAMUSCULAR; INTRAVENOUS EVERY 5 MIN PRN
Status: DISCONTINUED | OUTPATIENT
Start: 2017-06-27 | End: 2017-06-27 | Stop reason: HOSPADM

## 2017-06-27 RX ORDER — GABAPENTIN 300 MG/1
300 CAPSULE ORAL ONCE
Status: COMPLETED | OUTPATIENT
Start: 2017-06-27 | End: 2017-06-27

## 2017-06-27 RX ORDER — BUPIVACAINE HYDROCHLORIDE 2.5 MG/ML
INJECTION, SOLUTION EPIDURAL; INFILTRATION; INTRACAUDAL PRN
Status: DISCONTINUED | OUTPATIENT
Start: 2017-06-27 | End: 2017-06-27 | Stop reason: HOSPADM

## 2017-06-27 RX ORDER — NALOXONE HYDROCHLORIDE 0.4 MG/ML
.1-.4 INJECTION, SOLUTION INTRAMUSCULAR; INTRAVENOUS; SUBCUTANEOUS
Status: DISCONTINUED | OUTPATIENT
Start: 2017-06-27 | End: 2017-06-28 | Stop reason: HOSPADM

## 2017-06-27 RX ORDER — SODIUM CHLORIDE, SODIUM LACTATE, POTASSIUM CHLORIDE, CALCIUM CHLORIDE 600; 310; 30; 20 MG/100ML; MG/100ML; MG/100ML; MG/100ML
INJECTION, SOLUTION INTRAVENOUS CONTINUOUS
Status: DISCONTINUED | OUTPATIENT
Start: 2017-06-27 | End: 2017-06-27 | Stop reason: HOSPADM

## 2017-06-27 RX ORDER — SODIUM CHLORIDE, SODIUM LACTATE, POTASSIUM CHLORIDE, CALCIUM CHLORIDE 600; 310; 30; 20 MG/100ML; MG/100ML; MG/100ML; MG/100ML
INJECTION, SOLUTION INTRAVENOUS CONTINUOUS PRN
Status: DISCONTINUED | OUTPATIENT
Start: 2017-06-27 | End: 2017-06-27

## 2017-06-27 RX ORDER — PROPOFOL 10 MG/ML
INJECTION, EMULSION INTRAVENOUS CONTINUOUS PRN
Status: DISCONTINUED | OUTPATIENT
Start: 2017-06-27 | End: 2017-06-27

## 2017-06-27 RX ORDER — ACETAMINOPHEN 325 MG/1
975 TABLET ORAL ONCE
Status: COMPLETED | OUTPATIENT
Start: 2017-06-27 | End: 2017-06-27

## 2017-06-27 RX ORDER — LIDOCAINE HYDROCHLORIDE 20 MG/ML
INJECTION, SOLUTION INFILTRATION; PERINEURAL PRN
Status: DISCONTINUED | OUTPATIENT
Start: 2017-06-27 | End: 2017-06-27

## 2017-06-27 RX ORDER — DEXAMETHASONE SODIUM PHOSPHATE 10 MG/ML
INJECTION, SOLUTION INTRAMUSCULAR; INTRAVENOUS PRN
Status: DISCONTINUED | OUTPATIENT
Start: 2017-06-27 | End: 2017-06-27

## 2017-06-27 RX ORDER — GLYCOPYRROLATE 0.2 MG/ML
INJECTION, SOLUTION INTRAMUSCULAR; INTRAVENOUS PRN
Status: DISCONTINUED | OUTPATIENT
Start: 2017-06-27 | End: 2017-06-27

## 2017-06-27 RX ORDER — KETOROLAC TROMETHAMINE 30 MG/ML
INJECTION, SOLUTION INTRAMUSCULAR; INTRAVENOUS PRN
Status: DISCONTINUED | OUTPATIENT
Start: 2017-06-27 | End: 2017-06-27

## 2017-06-27 RX ORDER — LIDOCAINE 40 MG/G
CREAM TOPICAL
Status: DISCONTINUED | OUTPATIENT
Start: 2017-06-27 | End: 2017-06-27 | Stop reason: HOSPADM

## 2017-06-27 RX ADMIN — GABAPENTIN 300 MG: 300 CAPSULE ORAL at 07:10

## 2017-06-27 RX ADMIN — GLYCOPYRROLATE 0.2 MG: 0.2 INJECTION, SOLUTION INTRAMUSCULAR; INTRAVENOUS at 08:35

## 2017-06-27 RX ADMIN — SODIUM CHLORIDE, SODIUM LACTATE, POTASSIUM CHLORIDE, CALCIUM CHLORIDE: 600; 310; 30; 20 INJECTION, SOLUTION INTRAVENOUS at 08:32

## 2017-06-27 RX ADMIN — LIDOCAINE HYDROCHLORIDE 40 MG: 20 INJECTION, SOLUTION INFILTRATION; PERINEURAL at 08:39

## 2017-06-27 RX ADMIN — FENTANYL CITRATE 50 MCG: 50 INJECTION, SOLUTION INTRAMUSCULAR; INTRAVENOUS at 08:35

## 2017-06-27 RX ADMIN — PROPOFOL 200 MG: 10 INJECTION, EMULSION INTRAVENOUS at 08:39

## 2017-06-27 RX ADMIN — ONDANSETRON 4 MG: 2 INJECTION INTRAMUSCULAR; INTRAVENOUS at 09:55

## 2017-06-27 RX ADMIN — PROPOFOL 50 MG: 10 INJECTION, EMULSION INTRAVENOUS at 09:04

## 2017-06-27 RX ADMIN — ACETAMINOPHEN 975 MG: 325 TABLET ORAL at 07:10

## 2017-06-27 RX ADMIN — PROPOFOL 150 MCG/KG/MIN: 10 INJECTION, EMULSION INTRAVENOUS at 08:39

## 2017-06-27 RX ADMIN — SODIUM CHLORIDE, SODIUM LACTATE, POTASSIUM CHLORIDE, CALCIUM CHLORIDE: 600; 310; 30; 20 INJECTION, SOLUTION INTRAVENOUS at 07:31

## 2017-06-27 RX ADMIN — PROPOFOL 100 MG: 10 INJECTION, EMULSION INTRAVENOUS at 08:41

## 2017-06-27 RX ADMIN — KETOROLAC TROMETHAMINE 30 MG: 30 INJECTION, SOLUTION INTRAMUSCULAR; INTRAVENOUS at 10:18

## 2017-06-27 RX ADMIN — PROPOFOL 50 MG: 10 INJECTION, EMULSION INTRAVENOUS at 08:42

## 2017-06-27 RX ADMIN — DEXAMETHASONE SODIUM PHOSPHATE 10 MG: 10 INJECTION, SOLUTION INTRAMUSCULAR; INTRAVENOUS at 08:48

## 2017-06-27 RX ADMIN — FENTANYL CITRATE 50 MCG: 50 INJECTION, SOLUTION INTRAMUSCULAR; INTRAVENOUS at 09:00

## 2017-06-27 NOTE — ANESTHESIA PREPROCEDURE EVALUATION
Anesthesia Evaluation     . Pt has had prior anesthetic.     No history of anesthetic complications          ROS/MED HX    ENT/Pulmonary: Comment: Motion sickness      Neurologic:  - neg neurologic ROS     Cardiovascular:         METS/Exercise Tolerance:     Hematologic:  - neg hematologic  ROS       Musculoskeletal:  - neg musculoskeletal ROS       GI/Hepatic:  - neg GI/hepatic ROS       Renal/Genitourinary:  - ROS Renal section negative       Endo:     (+) thyroid problem .      Psychiatric:  - neg psychiatric ROS       Infectious Disease:  - neg infectious disease ROS       Malignancy:      - no malignancy   Other:                     Physical Exam  Normal systems: cardiovascular and pulmonary    Airway   Mallampati: II  TM distance: >3 FB  Neck ROM: full    Dental     Cardiovascular   Rhythm and rate: regular and normal      Pulmonary    breath sounds clear to auscultation                    Anesthesia Plan      History & Physical Review      ASA Status:  2 .    NPO Status:  > 8 hours    Plan for General and ETT with Intravenous induction. Maintenance will be Balanced.    PONV prophylaxis:  Ondansetron (or other 5HT-3) and Dexamethasone or Solumedrol  Additional equipment: Videolaryngoscope      Postoperative Care  Postoperative pain management:  Multi-modal analgesia.      Consents  Anesthetic plan, risks, benefits and alternatives discussed with:  Patient.  Use of blood products discussed: No .   .        ANESTHESIA PREOP EVALUATION    NPO Status: more then 6 hours    Procedure:     HPI:     PMHx/PSHx/ROS:  PAST MEDICAL HISTORY:   Past Medical History:   Diagnosis Date     Motion sickness        PAST SURGICAL HISTORY:   Past Surgical History:   Procedure Laterality Date     APPENDECTOMY  16      SECTION         FAMILY HISTORY:   Family History   Problem Relation Age of Onset     Hypertension Father      Hyperthyroidism Mother      Thyroid     Hypertension Paternal Grandmother      Colon Cancer No  family hx of      Breast Cancer No family hx of      Thyroid Cancer No family hx of          Past Anes Hx: No personal or family h/o anesthesia problems    Soc Hx:   Tobacco:   EtOH:    Allergies: No Known Allergies    Meds:     (Not in a hospital admission)    No current outpatient prescriptions on file.       Physical Exam:  VS: T 97.5, P 52, /85, R 16, SpO2 98%         BMP:  No results found for: NA   No results found for: POTASSIUM  No results found for: CHLORIDE  No results found for: ABHISHEK  No results found for: CO2  No results found for: BUN  No results found for: CR  Lab Results   Component Value Date    GLC 85 02/24/2016        CBC:  Lab Results   Component Value Date    WBC 4.5 07/08/2013     Lab Results   Component Value Date    HGB 13.5 06/08/2017     Lab Results   Component Value Date    HCT 39.5 07/08/2013     Lab Results   Component Value Date     07/08/2013        Coags/Type and Screen  No results found for: INR  No results found for: PT  Type and Screen:      Oziel Schmid MD    6/27/2017  7:04 AM  Risks, benefits, side effects and intended purposes discussed.                      .

## 2017-06-27 NOTE — BRIEF OP NOTE
[facility]    Brief Operative Note    Pre-operative diagnosis: Thyroid Nodule  Post-operative diagnosis * No post-op diagnosis entered *  Procedure: Procedure(s):  Right Thyroid Lobectomy, Isthmysectomy - Wound Class: I-Clean  Surgeon: Surgeon(s) and Role:     * Marisa Enamorado MD - Primary  Anesthesia: General   Estimated blood loss: 5 cc  Drains: None  Specimens:   ID Type Source Tests Collected by Time Destination   A : Right thyroid lobe and isthmus Tissue Neck SURGICAL PATHOLOGY EXAM Marisa Enamorado MD 6/27/2017  9:01 AM      Findings:   5 mm nodule at right thyroid near the isthmus. No signs of extracapsular extension. Frozen section showed microcarcinoma  Complications: None.  Implants: None.

## 2017-06-27 NOTE — IP AVS SNAPSHOT
[facility]    [address] [place] [address]    Phone:  [phone]    Fax:  [phone]                                       After Visit Summary   6/27/2017    Michelle Jalloh    MRN: [id number]           After Visit Summary Signature Page     I have received my discharge instructions, and my questions have been answered. I have discussed any challenges I see with this plan with the nurse or doctor.    ..........................................................................................................................................  Patient/Patient Representative Signature      ..........................................................................................................................................  Patient Representative Print Name and Relationship to Patient    ..................................................               ................................................  Date                                            Time    ..........................................................................................................................................  Reviewed by Signature/Title    ...................................................              ..............................................  Date                                                            Time

## 2017-06-27 NOTE — IP AVS SNAPSHOT
MRN:[id number]                      After Visit Summary   6/27/2017    Michelle Jalloh    MRN: [id number]           Thank you!     Thank you for choosing [facility] for your care. Our goal is always to provide you with excellent care. Hearing back from our patients is one way we can continue to improve our services. Please take a few minutes to complete the written survey that you may receive in the mail after you visit with us. Thank you!        Patient Information     Date Of Birth          1975        About your hospital stay     You were admitted on:  June 27, 2017 You last received care in the:  [facility]    You were discharged on:  June 27, 2017       Who to Call     For medical emergencies, please call 911.  For non-urgent questions about your medical care, please call your primary care provider or clinic, [phone]  For questions related to your surgery, please call your surgery clinic        Attending Provider     Provider Specialty    Marisa Enamorado MD General Surgery       Primary Care Provider Office Phone # Fax #    Bijal Jaimes [phone]      After Care Instructions     Diet Instructions       Resume pre procedure diet            Discharge Instructions       Patient to follow up with Dr. Enamorado on 7/21 as previously scheduled            Discharge Instructions - Lifting restrictions       Lifting Restrictions 15 pounds until seen at Post-op follow up appointment                  Your next 10 appointments already scheduled     Jul 21, 2017 10:00 AM CDT   [facility]   [facility]    [address] [place] [address] [phone]              Further instructions from your care team       [facility]  Home Care Following Anesthesia  For 24 hours after surgery:  1. Get plenty of rest.  A responsible  adult must stay with you for at least 24 hours after you leave the surgery center.  2. Do not drive or use heavy equipment.  If you have weakness or tingling, don't drive or use heavy equipment until this feeling goes away.   3. Do not drink alcohol.   4. Avoid strenuous or risky activities.  Ask for help when climbing stairs.  5. You may feel lightheaded.  IF so, sit for a few minutes before standing.  Have someone help you get up.   6. If you have nausea (feel sick to your stomach): Drink only clear liquids such as apple juice, ginger ale, broth or 7-Up.  Rest may also help.  Be sure to drink enough fluids.  Move to a regular diet as you feel able.   7. You may have a slight fever.  Call the doctor if your fever is over 100 F (37.7 C) (taken under the tongue) or lasts longer than 24 hours.  8. You may have a dry mouth, a sore throat, muscle aches or trouble sleeping. These should go away after 24 hours.  9. Do not make important or legal decisions.               Tips for taking pain medications  To get the best pain relief possible, remember these points:    Take pain medications as directed, before pain becomes severe.    Pain medication can upset your stomach: taking it with food may help.    Constipation is a common side effect of pain medication. Drink plenty of  fluids.    Eat foods high in fiber. Take a stool softener if recommended by your doctor or pharmacist.    Do not drink alcohol, drive or operate machinery while taking pain medications.    Ask about other ways to control pain, such as with heat, ice or relaxation.    Call a doctor for any of the followin. Signs of infection (fever, growing tenderness at the surgery site, a large amount of drainage or bleeding, severe pain, foul-smelling drainage, redness, swelling).  2. It has been over 8 to 10 hours since surgery and you are still not able to urinate (pass water).  3. Headache for over 24 hours.  4. Numbness, tingling or weakness the day after  "surgery (if you had spinal anesthesia).  Your doctor is:  Dr. Marisa Enamorado, ENT Otolaryngology: [phone]                    Or dial [phone] and ask for the resident on call for:  Prostate Urology  For emergency care, call the:  [place] [facility]:  [phone] (TTY for hearing impaired: [phone])                Pending Results     Date and Time Order Name Status Description    6/27/2017 0936 Surgical pathology exam In process             Admission Information     Date & Time Provider Department Dept. Phone    6/27/2017 Marisa Enamorado MD [facility] [phone]      Your Vitals Were     Blood Pressure Pulse Temperature Respirations Height Weight    126/85 52 97.5  F (36.4  C) (Oral) 16 1.626 m (5' 4\") 66.2 kg (146 lb)    Last Period Pulse Oximetry BMI (Body Mass Index)             06/03/2017 98% 25.06 kg/m2         NuORDER Information     [employer] gives you secure access to your electronic health record. If you see a primary care provider, you can also send messages to your care team and make appointments. If you have questions, please call your primary care clinic.  If you do not have a primary care provider, please call [phone] and they will assist you.      NuORDER is an electronic gateway that provides easy, online access to your medical records. With NuORDER, you can request a clinic appointment, read your test results, renew a prescription or communicate with your care team.     To access your existing account, please contact your [facility] or call [phone] for assistance.        Care EveryWhere ID     This is your Care EveryWhere ID. This could be used by other organizations to access your [place] medical records  [id number]        Equal Access to Services     KEVIN ENCARNACION AH: Latha Kirby, benjie dong, allen fulton, gualberto patel. So wa " [phone].    ATENCIÓN: Si jalen weir, tiene a shepherd disposición servicios gratuitos de asistencia lingüística. Harinder enriquez [phone].    We comply with applicable federal civil rights laws and [place] laws. We do not discriminate on the basis of race, color, national origin, age, disability sex, sexual orientation or gender identity.               Review of your medicines      START taking        Dose / Directions    HYDROcodone-acetaminophen 5-325 MG per tablet   Commonly known as:  NORCO   Used for:  S/P thyroid surgery        Dose:  1-2 tablet   Take 1-2 tablets by mouth every 4 hours as needed for other (Moderate to Severe Pain)   Quantity:  30 tablet   Refills:  0            Where to get your medicines      Some of these will need a paper prescription and others can be bought over the counter. Ask your nurse if you have questions.     Bring a paper prescription for each of these medications     HYDROcodone-acetaminophen 5-325 MG per tablet                Protect others around you: Learn how to safely use, store and throw away your medicines at www.disposemymeds.org.             Medication List: This is a list of all your medications and when to take them. Check marks below indicate your daily home schedule. Keep this list as a reference.      Medications           Morning Afternoon Evening Bedtime As Needed    HYDROcodone-acetaminophen 5-325 MG per tablet   Commonly known as:  NORCO   Take 1-2 tablets by mouth every 4 hours as needed for other (Moderate to Severe Pain)

## 2017-06-27 NOTE — ANESTHESIA CARE TRANSFER NOTE
Patient: Michelle Jalloh    Procedure(s):  Right Thyroid Lobectomy, Isthmysectomy - Wound Class: I-Clean    Diagnosis: Thyroid Nodule  Diagnosis Additional Information: No value filed.    Anesthesia Type:   General, ETT     Note:  Airway :Face Mask  Patient transferred to:PACU  Comments: Arrive PACU, Stable, Airway Intact  124/74, 70,14,100%  All questions answered.        Vitals: (Last set prior to Anesthesia Care Transfer)    CRNA VITALS  6/27/2017 0956 - 6/27/2017 1029      6/27/2017             Pulse: 64    SpO2: 98 %    Resp Rate (observed): 17                Electronically Signed By: EVA Flores CRNA  June 27, 2017  10:29 AM

## 2017-06-27 NOTE — ANESTHESIA POSTPROCEDURE EVALUATION
Patient: Michelle Jalloh    Procedure(s):  Right Thyroid Lobectomy, Isthmysectomy - Wound Class: I-Clean    Diagnosis:Thyroid Nodule  Diagnosis Additional Information: No value filed.    Anesthesia Type:  General, ETT    Note:  Anesthesia Post Evaluation    Patient location during evaluation: PACU  Patient participation: Able to fully participate in evaluation  Level of consciousness: awake  Pain management: adequate  Airway patency: patent  Cardiovascular status: acceptable and stable  Respiratory status: acceptable and room air  Hydration status: acceptable  PONV: none     Anesthetic complications: None          Last vitals:  Vitals:    06/27/17 1030 06/27/17 1045 06/27/17 1100   BP: 111/66 114/81 118/80   Pulse:      Resp: 16 16 16   Temp: 36.1  C (97  F) 36.1  C (97  F) 36.7  C (98  F)   SpO2: 100% 97% 97%         Electronically Signed By: Vern Schmid MD  June 27, 2017  11:08 AM

## 2017-06-27 NOTE — DISCHARGE INSTRUCTIONS
"Discharge Instruction  -- If you have questions for Dr. Enamorado, you can reach her at [phone]  -- If you develop fevers, chills, neck swelling or shortness of breath then notify your doctor. If you have questions or concerns during the day please call ENT clinic at [phone]. After hours you may call [facility] at [phone] and ask for the \"ENT resident on call\".  -- Please do not lift more than 15 lbs or participate in strenuous exercise more than walking up the stairs until your follow up appointment.  -- Keep wound clean. Do not submerge wound under water until seen in your follow up appointment.      [facility]  Home Care Following Anesthesia  For 24 hours after surgery:  1. Get plenty of rest.  A responsible adult must stay with you for at least 24 hours after you leave the surgery center.  2. Do not drive or use heavy equipment.  If you have weakness or tingling, don't drive or use heavy equipment until this feeling goes away.   3. Do not drink alcohol.   4. Avoid strenuous or risky activities.  Ask for help when climbing stairs.  5. You may feel lightheaded.  IF so, sit for a few minutes before standing.  Have someone help you get up.   6. If you have nausea (feel sick to your stomach): Drink only clear liquids such as apple juice, ginger ale, broth or 7-Up.  Rest may also help.  Be sure to drink enough fluids.  Move to a regular diet as you feel able.   7. You may have a slight fever.  Call the doctor if your fever is over 100 F (37.7 C) (taken under the tongue) or lasts longer than 24 hours.  8. You may have a dry mouth, a sore throat, muscle aches or trouble sleeping. These should go away after 24 hours.  9. Do not make important or legal decisions.               Tips for taking pain medications  To get the best pain relief possible, remember these points:    Take pain medications as directed, before pain becomes severe.    Pain medication can upset " your stomach: taking it with food may help.    Constipation is a common side effect of pain medication. Drink plenty of  fluids.    Eat foods high in fiber. Take a stool softener if recommended by your doctor or pharmacist.    Do not drink alcohol, drive or operate machinery while taking pain medications.    Ask about other ways to control pain, such as with heat, ice or relaxation.    Call a doctor for any of the followin. Signs of infection (fever, growing tenderness at the surgery site, a large amount of drainage or bleeding, severe pain, foul-smelling drainage, redness, swelling).  2. It has been over 8 to 10 hours since surgery and you are still not able to urinate (pass water).  3. Headache for over 24 hours.  4. Numbness, tingling or weakness the day after surgery (if you had spinal anesthesia).  Your doctor is:  Dr. Marisa Enamorado, ENT Otolaryngology: [phone]                    Or dial [phone] and ask for the resident on call for:  Prostate Urology  For emergency care, call the:  [facility]:  [phone] (TTY for hearing impaired: [phone])

## 2017-06-29 LAB — COPATH REPORT: NORMAL

## 2017-06-29 NOTE — OP NOTE
Operative Report  June 29, 2017    Pre-op Diagnosis:  Right thyroid nodule  Post-op Diagnosis:   Right thyroid nodule  Procedure:  Right thyroid lobectomy, Isthmysectomy    Surgeons:  MD Renzo Sapp MD    Anesthesia:  GETA  EBL:  5 cc  Drains:   None      Complications:  None     Specimens:    ID Type Source Tests Collected by Time Destination   A : Right thyroid lobe and isthmus Tissue Neck SURGICAL PATHOLOGY EXAM Marisa Enamorado MD 6/27/2017  9:01 AM            Findings:  5 mm nodule at right thyroid near the isthmus. No signs of extracapsular extension. Frozen section showed microcarcinoma    Indications:  Ms. Jalloh is a 42 year old female who had a right thyroid 5x4x4 mm nodule that showed suspicious for papillary thyroid carcinoma on FNA. Her TSH and calcitonin were normal. She also had a lateral neck ultrasound and biopsy of level 2 and level 4 lymph nodes that were benign. After a detailed discussion of risks, benefits and alternatives, she elected to proceed with right thyroid lobectomy, isthmusectomy and possible total thyroidectomy.      Procedure:  After obtained signed informed consent, patient was transferred to the operative room. General anesthesia was induced and patient was intubated orotracheally by anesthesia staff. Patient was placed in a supine position. Patient's neck was preped and draped in the usual sterile fashion. A 3 cm incision was made along a skin crease below the cricoid. The platysma muscle was divided and a supplatysmal plane was developed. The strap muscles were identified and divided at the midline. The thyroid gland was then encountered. We then focus on the right thyroid lobe and carefully dissected it away from the strap muscle and fascia. The superior parathyroid gland was identified and left intact. The superior thyroidal vessels were identified and ligated close to the thyroid capsule. The dissection was then moved inferiorly and the right recurrent  laryngeal nerve was identified next to the cricothyroid joint. The right recurrent laryngeal nerve was further traced inferiorly and protected. The inferior parathyroid gland was identified and left intact. The inferior thyroid vessels were identified and ligated. The right thyroid lobe was then mobilized from the trachea all the way to the thyroid isthmus. The right thyroid lobe and the thyroid isthmus was then transected from the rest of the left thyroid lobe. The specimen was sent for frozen section. Hemostasis was achieved with bipolar cautery. The strap muscle was loosely closed with 3-0 chromic. The platysma muscle was approximated with 3-0 chromic. The frozen section pathology returned to be micro-carcinoma, therefore further completion thyroidectomy was not needed. The skin was closed with 5-0 Monocryl and Swiftset. Patient was turned back to anesthesia staff for waking from anesthesia without difficulties. Dr. Enamorado was present during the entire procedure.

## 2017-07-21 ENCOUNTER — OFFICE VISIT (OUTPATIENT)
Dept: SURGERY | Facility: CLINIC | Age: 42
End: 2017-07-21
Payer: COMMERCIAL

## 2017-07-21 VITALS — DIASTOLIC BLOOD PRESSURE: 72 MMHG | HEART RATE: 45 BPM | SYSTOLIC BLOOD PRESSURE: 108 MMHG

## 2017-07-21 DIAGNOSIS — E89.0 S/P PARTIAL THYROIDECTOMY: Primary | ICD-10-CM

## 2017-07-21 PROCEDURE — 99024 POSTOP FOLLOW-UP VISIT: CPT | Performed by: SURGERY

## 2017-07-21 ASSESSMENT — PAIN SCALES - GENERAL: PAINLEVEL: NO PAIN (0)

## 2017-07-21 NOTE — MR AVS SNAPSHOT
After Visit Summary   7/21/2017    Michelle Jalloh    MRN: [id number]           Patient Information     Date Of Birth          1975        Visit Information        Provider Department      7/21/2017 10:00 AM Marisa Enamorado MD [facility]        Today's Diagnoses     S/P partial thyroidectomy    -  1       Follow-ups after your visit        Who to contact     If you have questions or need follow up information about today's clinic visit or your schedule please contact [facility] directly at [phone].  Normal or non-critical lab and imaging results will be communicated to you by [employer] Partners[employer], letter or phone within 4 business days after the clinic has received the results. If you do not hear from us within 7 days, please contact the clinic through [employer] or phone. If you have a critical or abnormal lab result, we will notify you by phone as soon as possible.  Submit refill requests through BetterWorks or call your pharmacy and they will forward the refill request to us. Please allow 3 business days for your refill to be completed.          Additional Information About Your Visit        MyChart Information     BetterWorks gives you secure access to your electronic health record. If you see a primary care provider, you can also send messages to your care team and make appointments. If you have questions, please call your primary care clinic.  If you do not have a primary care provider, please call [phone] and they will assist you.      BetterWorks is an electronic gateway that provides easy, online access to your medical records. With BetterWorks, you can request a clinic appointment, read your test results, renew a prescription or communicate with your care team.     To access your existing account, please contact your [facility] or call [phone] for assistance.        Care EveryWhere ID     This is your Care EveryWhere ID. This could  be used by other organizations to access your [place] medical records  [id number]        Your Vitals Were     Pulse                   45            Blood Pressure from Last 3 Encounters:   07/21/17 108/72   06/27/17 118/80   06/08/17 111/70    Weight from Last 3 Encounters:   06/27/17 66.2 kg (146 lb)   06/08/17 67.1 kg (148 lb)   05/24/17 67.1 kg (148 lb)              Today, you had the following     No orders found for display       Primary Care Provider Office Phone # Fax #    Bijal Jaimes [phone]       [facility] [address] [facility] [address]        Equal Access to Services     KEVIN ENCARNACION : Hadii damián snowdeno Sodarwin, waaxda luqadaha, qaybta kaalmada adeegyada, gualberto patel. So [facility] [phone].    ATENCIÓN: Si habla español, tiene a shepherd disposición servicios gratuitos de asistencia lingüística. Llame al [phone].    We comply with applicable federal civil rights laws and [place] laws. We do not discriminate on the basis of race, color, national origin, age, disability sex, sexual orientation or gender identity.            Thank you!     Thank you for choosing [facility]  for your care. Our goal is always to provide you with excellent care. Hearing back from our patients is one way we can continue to improve our services. Please take a few minutes to complete the written survey that you may receive in the mail after your visit with us. Thank you!             Your Updated Medication List - Protect others around you: Learn how to safely use, store and throw away your medicines at www.disposemymeds.org.      Notice  As of 7/21/2017 10:58 AM    You have not been prescribed any medications.

## 2017-07-21 NOTE — PROGRESS NOTES
Post op visit s/p right thyroid lobectomy and isthmusectomy. Since the surgery, no problems with voice quality, inspiration or swallowing.    Wound healing well    Pathology reviewed with patient: Microscopic  0.4cm PTC  On the right.     Previous US shows very small cysts on the left.    A/P:  Reviewed with patient wound management and massage  Check TFT's at 6 weeks post op   F/U with Endocrinology

## 2017-07-21 NOTE — NURSING NOTE
"Michelle Jalloh's goals for this visit include:   Chief Complaint   Patient presents with     Surgical Followup     6/27/17 - right thyroid lobectomy isthmysectomy     She requests these members of her care team be copied on today's visit information: YES -     Referring Provider:  Tana Kam PA-C  [facility]  [address] [place] [address]    Initial /72 (BP Location: Left arm, Patient Position: Chair, Cuff Size: Adult Regular)  Pulse (!) 45 Estimated body mass index is 25.06 kg/(m^2) as calculated from the following:    Height as of 6/27/17: 1.626 m (5' 4\").    Weight as of 6/27/17: 66.2 kg (146 lb).  BP completed using cuff size: regular        "

## 2017-07-28 NOTE — OP NOTE
DICTATING PHYSICIAN:  Dr. Enamorado.    PATIENT:  Michelle Jalloh.    MEDICAL RECORD NUMBER:  [id number].    YOB: 1975.    DATE OF SERVICE:  06/27/2017.    PREOPERATIVE DIAGNOSIS:  Right thyroid nodule suspicious for papillary thyroid carcinoma.    POSTOPERATIVE DIAGNOSIS:  Right thyroid micropapillary carcinoma.    PROCEDURE PERFORMED:  1.  Right thyroid lobectomy.  2.  Continuous recurrent laryngeal nerve monitoring for 60 minutes.    ATTENDING SURGEON:  Dr. Enamorado.    ASSISTANT SURGEON:  Dr. Primitivo Terrazas.    INDICATIONS:  Ms. Jalloh is a 42-year-old female with a history of right thyroid nodule measuring approximately 5 x 4 x 4 mm.  An US FNA was obtained and showed to be suspicious for papillary thyroid carcinoma.  Her TSH and calcitonin were otherwise normal.  A detailed discussion of risks, benefits and alternatives of the procedure was held.  The risk of the proposed procedure includes but not limited to bleeding, infection, recurrent laryngeal nerve injury,  potentially permanent hypocalcemia.  The patient agreed with the procedure and signed informed consent.      INTRAOPERATIVE FINDINGS:  Small 5 x 4 mm hard nodule noted in the right thyroid lobe near the isthmus.  There is no microscopic evidence of mass extension out of the thyroid capsule.      PROCEDURE DESCRIPTION:  After properly identifying patient and obtaining signed informed consent, the patient was transferred to operative room.  General anesthesia was induced and patient was intubated orotracheally by anesthesia staff.  The patient was placed in a supine position.  The patient was prepped and draped in the usual sterile fashion.  A time out was carried out immediately prior to the procedure to confirm the identity of the patient and correctness of the procedure.  A 4 cm horizontal incision was made right below the cricoid cartilage.  The platysma muscle was  with Bovie cautery.  A subplatismal plane was developed.  The  midline of the strap muscles were identified and divided at the midline.  The strap muscles were then retracted to the right side to expose the right thyroid gland.  The superior pole of the thyroid gland was first immobilized by blunt dissection.  The superior upper thyroid gland was grasped and retracted inferior-laterally.  The superior thyroid vessels were identified, skeletonised and were clipped and divided with LigaSure as close to the thyroid capsule as possible.  The dissection was then carried further down to the mid portion of the thyroid lobe.  The superior parathyroid gand and laryngeal nerve werre identified and protected.  The dissection was then carried down to the inferior pole of the thyroid gland. The middle thyroid vein was clipped then ligated.  Inferior para thyroid tissue was identified and also left intact.  The inferior thyroid vessels were then divided with clips and LigaSure.  The right thyroid lobe was then mobilized from a lateral to medial direction after trachea.  The thyroid isthmus was then divided with the Ligasure followed with a complete removal of the right thyroid lobe.  After removal of the specimen, the specimen was thoroughly examined in order to have only 1 5 x 4 mm nodule near the thyroid isthmus.  There was no evidence of gross extra capsule involvement by the nodule.  The specimen was then sent down for frozen section. Frozen sectionn Pathology results were consistent with micropapillary carcinoma.  The patient's wound was irrigated with copious amount of normal saline and suctioned out.  The wound was then inspected and noted to have no significant bleeding. Prior to closing, we confirmed that both parathyroid glands were intact and viable. The right recurrent laryngeal nerve was intact visible and with nerve stimulation. We then proceeded with closing.  The strap muscle was closed with 1 simple interrupted stitch with 0 chromic.  The platysmal muscle was closed with 3  simple interrupted sutures with 3-0 chromic.  The skin was closed with 5-0 Monocryl in a subcuticular fashion.  The skin edge was further closed with SwiftSet dermal adhesive.  The patient was then turned back to anesthesia staff and wakened from general anesthesia without difficulties.  Dr. Enamorado was present during the entire procedure.      ESTIMATED BLOOD LOSS:  5 mL.    SPECIMENS:  Right thyroid lobe.    DISPOSITION:  The patient was transferred to PACU area in stable condition.  The patient will be discharged home today with [place] for pain.  The patient will be seen in the clinic for followup by Dr. Enamorado as previously scheduled.

## 2017-08-15 DIAGNOSIS — E04.2 MULTINODULAR GOITER: Primary | ICD-10-CM

## 2017-08-24 DIAGNOSIS — E04.1 THYROID NODULE: Primary | ICD-10-CM

## 2017-08-25 DIAGNOSIS — E04.2 MULTINODULAR GOITER: ICD-10-CM

## 2017-08-25 DIAGNOSIS — E04.1 THYROID NODULE: ICD-10-CM

## 2017-08-25 LAB
DEPRECATED CALCIDIOL+CALCIFEROL SERPL-MC: 38 UG/L (ref 20–75)
T3FREE SERPL-MCNC: 2.7 PG/ML (ref 2.3–4.2)
T4 FREE SERPL-MCNC: 1.01 NG/DL (ref 0.76–1.46)
THYROPEROXIDASE AB SERPL-ACNC: <10 IU/ML
TSH SERPL DL<=0.005 MIU/L-ACNC: 1.7 MU/L (ref 0.4–4)
TSH SERPL DL<=0.005 MIU/L-ACNC: 1.7 MU/L (ref 0.4–4)

## 2017-08-29 LAB
IODINE UR-MCNC: 70 MCG/L (ref 26–705)
ZINC SERPL-MCNC: 76 UG/DL (ref 60–120)

## 2017-09-27 LAB — LAB SCANNED RESULT: NORMAL

## 2017-11-25 ENCOUNTER — TRANSFERRED RECORDS (OUTPATIENT)
Dept: HEALTH INFORMATION MANAGEMENT | Facility: CLINIC | Age: 42
End: 2017-11-25

## 2018-08-07 ENCOUNTER — OFFICE VISIT (OUTPATIENT)
Dept: FAMILY MEDICINE | Facility: CLINIC | Age: 43
End: 2018-08-07
Payer: COMMERCIAL

## 2018-08-07 VITALS
HEIGHT: 64 IN | HEART RATE: 57 BPM | DIASTOLIC BLOOD PRESSURE: 70 MMHG | WEIGHT: 154 LBS | BODY MASS INDEX: 26.29 KG/M2 | OXYGEN SATURATION: 97 % | SYSTOLIC BLOOD PRESSURE: 110 MMHG | RESPIRATION RATE: 15 BRPM | TEMPERATURE: 98.8 F

## 2018-08-07 DIAGNOSIS — N60.22 LUMPY BREASTS, LEFT: Primary | ICD-10-CM

## 2018-08-07 DIAGNOSIS — D23.5 BENIGN NEOPLASM OF SKIN OF TRUNK, EXCEPT SCROTUM: ICD-10-CM

## 2018-08-07 PROCEDURE — 11401 EXC TR-EXT B9+MARG 0.6-1 CM: CPT | Performed by: FAMILY MEDICINE

## 2018-08-07 PROCEDURE — 99213 OFFICE O/P EST LOW 20 MIN: CPT | Mod: 25 | Performed by: FAMILY MEDICINE

## 2018-08-07 PROCEDURE — 88305 TISSUE EXAM BY PATHOLOGIST: CPT | Performed by: FAMILY MEDICINE

## 2018-08-07 NOTE — MR AVS SNAPSHOT
After Visit Summary   8/7/2018    Michelle Jalloh    MRN: [id number]           Patient Information     Date Of Birth          1975        Visit Information        Provider Department      8/7/2018 2:20 PM Bijal Jaimes MD [facility]        Today's Diagnoses     Lumpy breasts, left    -  1    Benign neoplasm of skin of trunk, except scrotum           Follow-ups after your visit        Future tests that were ordered for you today     Open Future Orders        Priority Expected Expires Ordered    MA Diagnostic Digital Bilateral Routine  8/7/2019 8/7/2018            Who to contact     If you have questions or need follow up information about today's clinic visit or your schedule please contact [facility] directly at [phone].  Normal or non-critical lab and imaging results will be communicated to you by [employer], letter or phone within 4 business days after the clinic has received the results. If you do not hear from us within 7 days, please contact the clinic through [employer] or phone. If you have a critical or abnormal lab result, we will notify you by phone as soon as possible.  Submit refill requests through ODIN or call your pharmacy and they will forward the refill request to us. Please allow 3 business days for your refill to be completed.          Additional Information About Your Visit        MyChart Information     [employer]IN gives you secure access to your electronic health record. If you see a primary care provider, you can also send messages to your care team and make appointments. If you have questions, please call your primary care clinic.  If you do not have a primary care provider, please call [phone] and they will assist you.        Care EveryWhere ID     This is your Care EveryWhere ID. This could be used by other organizations to access your [place] medical records  [id number]        Your Vitals Were     Pulse Temperature Respirations Height  "Pulse Oximetry BMI (Body Mass Index)    57 98.8  F (37.1  C) 15 5' 3.5\" (1.613 m) 97% 26.85 kg/m2       Blood Pressure from Last 3 Encounters:   08/07/18 110/70   07/21/17 108/72   06/27/17 118/80    Weight from Last 3 Encounters:   08/07/18 154 lb (69.9 kg)   06/27/17 146 lb (66.2 kg)   06/08/17 148 lb (67.1 kg)              We Performed the Following     EXC BENIGN SKIN LESION TRUNK/ARM/LEG 0.6-1.0 CM     Surgical pathology exam        Primary Care Provider Office Phone # Fax #    Bijal Jaimes [phone] [address] [facility] [address]        Equal Access to Services     [facility] Hospital[facility] : Hadii damián snowdeno Sodarwin, waaxda luqadaha, qaybta kaalmada kirstin, gualberto ingram . So [facility] [phone].    ATENCIÓN: Si habla español, tiene a shepherd disposición servicios gratuitos de asistencia lingüística. [facility] [phone].    We comply with applicable federal civil rights laws and [place] laws. We do not discriminate on the basis of race, color, national origin, age, disability, sex, sexual orientation, or gender identity.            Thank you!     Thank you for choosing [facility]  for your care. Our goal is always to provide you with excellent care. Hearing back from our patients is one way we can continue to improve our services. Please take a few minutes to complete the written survey that you may receive in the mail after your visit with us. Thank you!             Your Updated Medication List - Protect others around you: Learn how to safely use, store and throw away your medicines at www.disposemymeds.org.      Notice  As of 8/7/2018  3:22 PM    You have not been prescribed any medications.      "

## 2018-08-07 NOTE — PROGRESS NOTES
SUBJECTIVE:   Michelle Jalloh is a 43 year old female who presents to clinic today for the following health issues:    Pt says her Breast tend to be Lumpy  And swollen left Breast  No tenderness   LMP 2 weeks ago  No family history of Breast cancer  No drainage Nipple  Pt also has a lesion which she wants Removed Left Breast        Problem list and histories reviewed & adjusted, as indicated.  Additional history: as documented    Patient Active Problem List   Diagnosis     CARDIOVASCULAR SCREENING; LDL GOAL LESS THAN 160     Family history of thyroid disease     Common wart     Lymph node enlargement     Discoid lupus     Nontoxic multinodular goiter     Anxiety state     Past Surgical History:   Procedure Laterality Date     APPENDECTOMY  18      SECTION       THYROIDECTOMY Right 2017    Procedure: THYROIDECTOMY;  Right Thyroid Lobectomy, Isthmysectomy;  Surgeon: Marisa Enamorado MD;  Location:  OR       Social History   Substance Use Topics     Smoking status: Former Smoker     Start date: 1999     Smokeless tobacco: Never Used     Alcohol use 0.0 oz/week     0 Standard drinks or equivalent per week      Comment: 1/week if that.     Family History   Problem Relation Age of Onset     Hypertension Father      Hyperthyroidism Mother      Thyroid     Hypertension Paternal Grandmother      Colon Cancer No family hx of      Breast Cancer No family hx of      Thyroid Cancer No family hx of          No current outpatient prescriptions on file.     No Known Allergies  Recent Labs   Lab Test  17   0955  16   1845  16   1101   LDL   --    --   86   HDL   --    --   78   TRIG   --    --   28   TSH  1.70  1.70  1.60  1.22      BP Readings from Last 3 Encounters:   18 110/70   17 108/72   17 118/80    Wt Readings from Last 3 Encounters:   18 154 lb (69.9 kg)   17 146 lb (66.2 kg)   17 148 lb (67.1 kg)                  Labs reviewed in  "EPIC    Reviewed and updated as needed this visit by clinical staff       Reviewed and updated as needed this visit by Provider         ROS:  CONSTITUTIONAL: NEGATIVE for fever, chills, change in weight  RESP: NEGATIVE for significant cough or SOB  BREAST: as above  CV: NEGATIVE for chest pain, palpitations or peripheral edema  ROS otherwise negative    OBJECTIVE:     /70  Pulse 57  Temp 98.8  F (37.1  C)  Resp 15  Ht 5' 3.5\" (1.613 m)  Wt 154 lb (69.9 kg)  SpO2 97%  BMI 26.85 kg/m2  Body mass index is 26.85 kg/(m^2).  GENERAL: healthy, alert and no distress  NECK: no adenopathy, no asymmetry, masses, or scars and thyroid normal to palpation  RESP: lungs clear to auscultation - no rales, rhonchi or wheezes  BREAST: appear  Fibrocystic  No axillary or supraclavicular adenopathy  CV: regular rate and rhythm, normal S1 S2, no S3 or S4, no murmur, click or rub, no peripheral edema and peripheral pulses strong    Diagnostic Test Results:  none     ASSESSMENT/PLAN:       1. Lumpy breasts, left  Referral done  Fibrocystic V=breast  - MA Diagnostic Digital Bilateral; Future    2. Benign neoplasm of skin of trunk, except scrotum  It has been Irritating her  1 cm mass/benign left Breast area  She wants it removed  - EXC BENIGN SKIN LESION TRUNK/ARM/LEG 0.6-1.0 CM  - Surgical pathology exam  SUBJECTIVE:   Michelle Jalloh is a 43 year old female who presents for lesion removal. We have already discussed this procedure, including option of not performing surgery, technique of surgery and potential for scarring at a recent visit.  PLAN:   After informed consent was obtained, using Betadine for cleansing and 2% Lidocaine with epinephrine for anesthetic, with sterile technique, lesion was excised  And area cauterized. Antibiotic dressing is applied, and wound care instructions provided.  Be alert for any signs of cutaneous infection. The procedure was well tolerated without complications. Follow up: The specimen is " labelled and sent to pathology for evaluation., The patient may return prn..          Bijal Jaimes MD  [facility]

## 2018-08-15 LAB — COPATH REPORT: NORMAL

## 2018-09-06 ENCOUNTER — OFFICE VISIT (OUTPATIENT)
Dept: FAMILY MEDICINE | Facility: CLINIC | Age: 43
End: 2018-09-06
Payer: COMMERCIAL

## 2018-09-06 VITALS
DIASTOLIC BLOOD PRESSURE: 70 MMHG | SYSTOLIC BLOOD PRESSURE: 112 MMHG | OXYGEN SATURATION: 100 % | BODY MASS INDEX: 26.7 KG/M2 | RESPIRATION RATE: 12 BRPM | TEMPERATURE: 97.4 F | HEIGHT: 64 IN | HEART RATE: 53 BPM | WEIGHT: 156.4 LBS

## 2018-09-06 DIAGNOSIS — Z12.4 SCREENING FOR MALIGNANT NEOPLASM OF CERVIX: ICD-10-CM

## 2018-09-06 DIAGNOSIS — Z00.00 ROUTINE GENERAL MEDICAL EXAMINATION AT A HEALTH CARE FACILITY: Primary | ICD-10-CM

## 2018-09-06 DIAGNOSIS — L93.0 DISCOID LUPUS: ICD-10-CM

## 2018-09-06 DIAGNOSIS — E04.2 NONTOXIC MULTINODULAR GOITER: ICD-10-CM

## 2018-09-06 DIAGNOSIS — Z23 NEED FOR PROPHYLACTIC VACCINATION AND INOCULATION AGAINST INFLUENZA: ICD-10-CM

## 2018-09-06 LAB
CHOLEST SERPL-MCNC: 182 MG/DL
GLUCOSE SERPL-MCNC: 92 MG/DL (ref 70–99)
HDLC SERPL-MCNC: 76 MG/DL
LDLC SERPL CALC-MCNC: 95 MG/DL
NONHDLC SERPL-MCNC: 106 MG/DL
TRIGL SERPL-MCNC: 53 MG/DL
TSH SERPL DL<=0.005 MIU/L-ACNC: 2.7 MU/L (ref 0.4–4)

## 2018-09-06 PROCEDURE — 80061 LIPID PANEL: CPT | Performed by: FAMILY MEDICINE

## 2018-09-06 PROCEDURE — 87624 HPV HI-RISK TYP POOLED RSLT: CPT | Performed by: FAMILY MEDICINE

## 2018-09-06 PROCEDURE — 82947 ASSAY GLUCOSE BLOOD QUANT: CPT | Performed by: FAMILY MEDICINE

## 2018-09-06 PROCEDURE — G0145 SCR C/V CYTO,THINLAYER,RESCR: HCPCS | Performed by: FAMILY MEDICINE

## 2018-09-06 PROCEDURE — 84443 ASSAY THYROID STIM HORMONE: CPT | Performed by: FAMILY MEDICINE

## 2018-09-06 PROCEDURE — 90471 IMMUNIZATION ADMIN: CPT | Performed by: FAMILY MEDICINE

## 2018-09-06 PROCEDURE — 90686 IIV4 VACC NO PRSV 0.5 ML IM: CPT | Performed by: FAMILY MEDICINE

## 2018-09-06 PROCEDURE — 36415 COLL VENOUS BLD VENIPUNCTURE: CPT | Performed by: FAMILY MEDICINE

## 2018-09-06 PROCEDURE — 99396 PREV VISIT EST AGE 40-64: CPT | Mod: 25 | Performed by: FAMILY MEDICINE

## 2018-09-06 ASSESSMENT — ENCOUNTER SYMPTOMS
PARESTHESIAS: 0
DIZZINESS: 0
SORE THROAT: 0
PALPITATIONS: 0
HEARTBURN: 0
NAUSEA: 0
EYE PAIN: 0
FREQUENCY: 0
COUGH: 0
DYSURIA: 0
BREAST MASS: 0
CONSTIPATION: 0
CHILLS: 0
WEAKNESS: 0
DIARRHEA: 0
ABDOMINAL PAIN: 0
NERVOUS/ANXIOUS: 0
MYALGIAS: 0
JOINT SWELLING: 0
SHORTNESS OF BREATH: 0
FEVER: 0
ARTHRALGIAS: 0
HEADACHES: 0
HEMATURIA: 0

## 2018-09-06 ASSESSMENT — PAIN SCALES - GENERAL: PAINLEVEL: NO PAIN (0)

## 2018-09-06 NOTE — PATIENT INSTRUCTIONS
[place] Phone number [phone]    Preventive Health Recommendations  Female Ages 40 to 49    Yearly exam:     See your health care provider every year in order to  1. Review health changes.   2. Discuss preventive care.    3. Review your medicines if your doctor prescribed any.      Get a Pap test every three years (unless you have an abnormal result and your provider advises testing more often).      If you get Pap tests with HPV test, you only need to test every 5 years, unless you have an abnormal result. You do not need a Pap test if your uterus was removed (hysterectomy) and you have not had cancer.      You should be tested each year for STDs (sexually transmitted diseases), if you're at risk.     Ask your doctor if you should have a mammogram.      Have a colonoscopy (test for colon cancer) if someone in your family has had colon cancer or polyps before age 50.       Have a cholesterol test every 5 years.       Have a diabetes test (fasting glucose) after age 45. If you are at risk for diabetes, you should have this test every 3 years.    Shots: Get a flu shot each year. Get a tetanus shot every 10 years.     Nutrition:     Eat at least 5 servings of fruits and vegetables each day.    Eat whole-grain bread, whole-wheat pasta and brown rice instead of white grains and rice.    Get adequate Calcium and Vitamin D.      Lifestyle    Exercise at least 150 minutes a week (an average of 30 minutes a day, 5 days a week). This will help you control your weight and prevent disease.    Limit alcohol to one drink per day.    No smoking.     Wear sunscreen to prevent skin cancer.    See your dentist every six months for an exam and cleaning.        Please get Diagnostic mammogram at [facility]  Take care  Bijal Jaimes MD    [facility]    If you have any questions regarding to your visit please contact your care team:       Team Red:   Clinic Hours Telephone Number   Dr. Yina Appiah  Theodore Richmond, NP 7am-7pm  Monday - Thursday   7am-5pm  Fridays  [phone]  (Appointment scheduling available 24/7)   Urgent Care - [place] - 11am-9pm Monday-Friday Saturday-Sunday- 9am-5pm   [place] - 5pm-9pm Monday-Friday Saturday-Sunday- 9am-5pm  [phone] - [place]  [phone] - [place]       What options do I have for a visit other than an office visit? We offer electronic visits (e-visits) and telephone visits, when medically appropriate.  Please check with your medical insurance to see if these types of visits are covered, as you will be responsible for any charges that are not paid by your insurance.      You can use Interview Rocket (secure electronic communication) to access to your chart, send your primary care provider a message, or make an appointment. Ask a team member how to get started.     For a price quote for your services, please call our Consumer Price Line at [phone] or our Imaging Cost estimation line at [phone] (for imaging tests).    Discharged By: An

## 2018-09-06 NOTE — PROGRESS NOTES
SUBJECTIVE:   CC: Michelle Jalloh is an 43 year old woman who presents for preventive health visit.     Physical   Annual:     Getting at least 3 servings of Calcium per day:  Yes    Bi-annual eye exam:  Yes    Dental care twice a year:  NO    Sleep apnea or symptoms of sleep apnea:  None    Diet:  Regular (no restrictions)    Frequency of exercise:  2-3 days/week    Duration of exercise:  Greater than 60 minutes    Taking medications regularly:  Yes    Medication side effects:  None    Additional concerns today:  No            Today's PHQ-2 Score:   PHQ-2 ( 1999 Pfizer) 9/6/2018   Q1: Little interest or pleasure in doing things 0   Q2: Feeling down, depressed or hopeless 0   PHQ-2 Score 0   Q1: Little interest or pleasure in doing things Not at all   Q2: Feeling down, depressed or hopeless Not at all   PHQ-2 Score 0       Abuse: Current or Past(Physical, Sexual or Emotional)- No  Do you feel safe in your environment - Yes    Social History   Substance Use Topics     Smoking status: Former Smoker     Start date: 1/29/1999     Smokeless tobacco: Never Used     Alcohol use 0.0 oz/week     0 Standard drinks or equivalent per week      Comment: 1/week if that.     Alcohol Use 9/6/2018   If you drink alcohol do you typically have greater than 3 drinks per day OR greater than 7 drinks per week? No       Reviewed orders with patient.  Reviewed health maintenance and updated orders accordingly - Yes  Labs reviewed in EPIC  BP Readings from Last 3 Encounters:   09/06/18 112/70   08/07/18 110/70   07/21/17 108/72    Wt Readings from Last 3 Encounters:   09/06/18 156 lb 6.4 oz (70.9 kg)   08/07/18 154 lb (69.9 kg)   06/27/17 146 lb (66.2 kg)                  Patient Active Problem List   Diagnosis     CARDIOVASCULAR SCREENING; LDL GOAL LESS THAN 160     Family history of thyroid disease     Common wart     Lymph node enlargement     Discoid lupus     Nontoxic multinodular goiter     Anxiety state     Past Surgical History:    Procedure Laterality Date     APPENDECTOMY  18      SECTION       THYROIDECTOMY Right 2017    Procedure: THYROIDECTOMY;  Right Thyroid Lobectomy, Isthmysectomy;  Surgeon: Marisa Enamorado MD;  Location:  OR       Social History   Substance Use Topics     Smoking status: Former Smoker     Start date: 1999     Smokeless tobacco: Never Used     Alcohol use 0.0 oz/week     0 Standard drinks or equivalent per week      Comment: 1/week if that.     Family History   Problem Relation Age of Onset     Hypertension Father      Hyperthyroidism Mother      Thyroid     Hypertension Paternal Grandmother      Diabetes Paternal Grandfather      Colon Cancer No family hx of      Breast Cancer No family hx of      Thyroid Cancer No family hx of          No current outpatient prescriptions on file.     No Known Allergies  Recent Labs   Lab Test  17   0955  16   1845  16   1101   LDL   --    --   86   HDL   --    --   78   TRIG   --    --   28   TSH  1.70  1.70  1.60  1.22        Patient over age 50, mutual decision to screen reflected in health maintenance.    Pertinent mammograms are reviewed under the imaging tab.  History of abnormal Pap smear: NO - age 30- 65 PAP every 3 years recommended  PAP / HPV 2016   PAP NIL NIL     Reviewed and updated as needed this visit by clinical staff  Tobacco  Allergies  Meds  Fam Hx  Soc Hx        Reviewed and updated as needed this visit by Provider  Allergies        Past Medical History:   Diagnosis Date     Motion sickness       Past Surgical History:   Procedure Laterality Date     APPENDECTOMY  18      SECTION       THYROIDECTOMY Right 2017    Procedure: THYROIDECTOMY;  Right Thyroid Lobectomy, Isthmysectomy;  Surgeon: Marisa Enamorado MD;  Location:  OR       Review of Systems   Constitutional: Negative for chills and fever.   HENT: Negative for ear pain, hearing loss and sore throat.    Eyes: Negative for  "pain and visual disturbance.   Respiratory: Negative for cough and shortness of breath.    Cardiovascular: Negative for palpitations and peripheral edema.   Gastrointestinal: Negative for abdominal pain, constipation, diarrhea, heartburn and nausea.   Breasts:  Negative for tenderness, breast mass and discharge.   Genitourinary: Negative for dysuria, frequency, genital sores, hematuria, pelvic pain, urgency, vaginal bleeding and vaginal discharge.   Musculoskeletal: Negative for arthralgias, joint swelling and myalgias.   Skin: Negative for rash.   Neurological: Negative for dizziness, weakness, headaches and paresthesias.   Psychiatric/Behavioral: Negative for mood changes. The patient is not nervous/anxious.      CONSTITUTIONAL: NEGATIVE for fever, chills, change in weight  INTEGUMENTARU/SKIN: NEGATIVE for worrisome rashes, moles or lesions  EYES: NEGATIVE for vision changes or irritation  ENT: NEGATIVE for ear, mouth and throat problems  RESP: NEGATIVE for significant cough or SOB  BREAST: NEGATIVE for masses, tenderness or discharge  CV: NEGATIVE for chest pain, palpitations or peripheral edema  GI: NEGATIVE for nausea, abdominal pain, heartburn, or change in bowel habits  : NEGATIVE for unusual urinary or vaginal symptoms. Periods are regular.  MUSCULOSKELETAL: NEGATIVE for significant arthralgias or myalgia  NEURO: NEGATIVE for weakness, dizziness or paresthesias  PSYCHIATRIC: NEGATIVE for changes in mood or affect     OBJECTIVE:   /70  Pulse 53  Temp 97.4  F (36.3  C) (Oral)  Resp 12  Ht 5' 4.02\" (1.626 m)  Wt 156 lb 6.4 oz (70.9 kg)  SpO2 100%  BMI 26.83 kg/m2  Physical Exam  GENERAL: healthy, alert and no distress  EYES: Eyes grossly normal to inspection, PERRL and conjunctivae and sclerae normal  HENT: ear canals and TM's normal, nose and mouth without ulcers or lesions  NECK: no adenopathy, no asymmetry, masses, or scars and thyroid normal to palpation  RESP: lungs clear to auscultation - " no rales, rhonchi or wheezes  BREAST: normal without masses, tenderness or nipple discharge and no palpable axillary masses or adenopathy  CV: regular rate and rhythm, normal S1 S2, no S3 or S4, no murmur, click or rub, no peripheral edema and peripheral pulses strong  ABDOMEN: soft, nontender, no hepatosplenomegaly, no masses and bowel sounds normal   (female): normal female external genitalia, normal urethral meatus, vaginal mucosa pink, moist, well rugated, and normal cervix/adnexa/uterus without masses or discharge  MS: no gross musculoskeletal defects noted, no edema  SKIN: no suspicious lesions or rashes  NEURO: Normal strength and tone, mentation intact and speech normal  PSYCH: mentation appears normal, affect normal/bright    Diagnostic Test Results:  Pending     ASSESSMENT/PLAN:   1. Routine general medical examination at a health care facility  Normal Exam  - Glucose  - Lipid panel reflex to direct LDL Fasting    2. Screening for malignant neoplasm of cervix  papdone  - Pap imaged thin layer screen with HPV - recommended age 30 - 65 years (select HPV order below)  - HPV High Risk Types DNA Cervical    3. Nontoxic multinodular goiter  Pt sees Endocrine  - TSH with free T4 reflex    4. Discoid lupus  Stable     5. Need for prophylactic vaccination and inoculation against influenza  Advised   - FLU VACCINE, SPLIT VIRUS, IM (QUADRIVALENT) [90019]- >3 YRS  - Vaccine Administration, Initial [65734]    COUNSELING:  Reviewed preventive health counseling, as reflected in patient instructions       Regular exercise       Healthy diet/nutrition       Immunizations    Vaccinated for: Influenza             Contraception       Osteoporosis Prevention/Bone Health       Colon cancer screening       The 10-year ASCVD risk score (Mauradrake SONG Jr, et al., 2013) is: 0.3%    Values used to calculate the score:      Age: 43 years      Sex: Female      Is Non- : No      Diabetic: No      Tobacco smoker:  "No      Systolic Blood Pressure: 112 mmHg      Is BP treated: No      HDL Cholesterol: 78 mg/dL      Total Cholesterol: 170 mg/dL    BP Readings from Last 1 Encounters:   09/06/18 112/70     Estimated body mass index is 26.83 kg/(m^2) as calculated from the following:    Height as of this encounter: 5' 4.02\" (1.626 m).    Weight as of this encounter: 156 lb 6.4 oz (70.9 kg).      Weight management plan: Exercise/low wilber diet     reports that she has quit smoking. She started smoking about 19 years ago. She has never used smokeless tobacco.      Counseling Resources:  ATP IV Guidelines  Pooled Cohorts Equation Calculator  Breast Cancer Risk Calculator  FRAX Risk Assessment  ICSI Preventive Guidelines  Dietary Guidelines for Americans, 2010  USDA's MyPlate  ASA Prophylaxis  Lung CA Screening    Bijal Jaimes MD  [facility]  "

## 2018-09-06 NOTE — MR AVS SNAPSHOT
After Visit Summary   9/6/2018    Michelle Jalloh    MRN: [id number]           Patient Information     Date Of Birth          1975        Visit Information        Provider Department      9/6/2018 9:00 AM Bijal Jaimes MD [facility]        Today's Diagnoses     Routine general medical examination at a health care facility    -  1    Screening for malignant neoplasm of cervix        Nontoxic multinodular goiter        Discoid lupus          Care Instructions    [place] Phone number [phone]    Preventive Health Recommendations  Female Ages 40 to 49    Yearly exam:     See your health care provider every year in order to  1. Review health changes.   2. Discuss preventive care.    3. Review your medicines if your doctor prescribed any.      Get a Pap test every three years (unless you have an abnormal result and your provider advises testing more often).      If you get Pap tests with HPV test, you only need to test every 5 years, unless you have an abnormal result. You do not need a Pap test if your uterus was removed (hysterectomy) and you have not had cancer.      You should be tested each year for STDs (sexually transmitted diseases), if you're at risk.     Ask your doctor if you should have a mammogram.      Have a colonoscopy (test for colon cancer) if someone in your family has had colon cancer or polyps before age 50.       Have a cholesterol test every 5 years.       Have a diabetes test (fasting glucose) after age 45. If you are at risk for diabetes, you should have this test every 3 years.    Shots: Get a flu shot each year. Get a tetanus shot every 10 years.     Nutrition:     Eat at least 5 servings of fruits and vegetables each day.    Eat whole-grain bread, whole-wheat pasta and brown rice instead of white grains and rice.    Get adequate Calcium and Vitamin D.      Lifestyle    Exercise at least 150 minutes a week (an average of 30 minutes a day, 5 days a week).  This will help you control your weight and prevent disease.    Limit alcohol to one drink per day.    No smoking.     Wear sunscreen to prevent skin cancer.    See your dentist every six months for an exam and cleaning.        Please get Diagnostic mammogram at [facility] care  Bijal Jaimes MD    [facility]    If you have any questions regarding to your visit please contact your care team:       Team Red:   Clinic Hours Telephone Number   Dr. Yina Richmond NP 7am-7pm  Monday - Thursday   7am-5pm  Fridays  [phone]  (Appointment scheduling available 24/7)   Urgent Care - [place] and [facility] - 11am-9pm Monday-Friday Saturday-Sunday- 9am-5pm   [place] - 5pm-9pm Monday-Friday Saturday-Sunday- 9am-5pm  [phone] - [place]  [phone] - [place]       What options do I have for a visit other than an office visit? We offer electronic visits (e-visits) and telephone visits, when medically appropriate.  Please check with your medical insurance to see if these types of visits are covered, as you will be responsible for any charges that are not paid by your insurance.      You can use Prestigos (secure electronic communication) to access to your chart, send your primary care provider a message, or make an appointment. Ask a team member how to get started.     For a price quote for your services, please call our Consumer Price Line at [phone] or our Imaging Cost estimation line at [phone] (for imaging tests).    Discharged By: An              Follow-ups after your visit        Who to contact     If you have questions or need follow up information about today's clinic visit or your schedule please contact [facility] directly at [phone].  Normal or non-critical lab and imaging results will be communicated to you by [email or web address], letter or phone within 4 business days after the clinic has received the  "results. If you do not hear from us within 7 days, please contact the clinic through AMEE or phone. If you have a critical or abnormal lab result, we will notify you by phone as soon as possible.  Submit refill requests through AMEE or call your pharmacy and they will forward the refill request to us. Please allow 3 business days for your refill to be completed.          Additional Information About Your Visit        AMEE Information     AMEE gives you secure access to your electronic health record. If you see a primary care provider, you can also send messages to your care team and make appointments. If you have questions, please call your primary care clinic.  If you do not have a primary care provider, please call [phone] and they will assist you.        Care EveryWhere ID     This is your Care EveryWhere ID. This could be used by other organizations to access your [facility] medical records  [id number]        Your Vitals Were     Pulse Temperature Respirations Height Pulse Oximetry BMI (Body Mass Index)    53 97.4  F (36.3  C) (Oral) 12 5' 4.02\" (1.626 m) 100% 26.83 kg/m2       Blood Pressure from Last 3 Encounters:   09/06/18 112/70   08/07/18 110/70   07/21/17 108/72    Weight from Last 3 Encounters:   09/06/18 156 lb 6.4 oz (70.9 kg)   08/07/18 154 lb (69.9 kg)   06/27/17 146 lb (66.2 kg)              We Performed the Following     Glucose     HPV High Risk Types DNA Cervical     Lipid panel reflex to direct LDL Fasting     Pap imaged thin layer screen with HPV - recommended age 30 - 65 years (select HPV order below)     TSH with free T4 reflex        Primary Care Provider Office Phone # Fax #    Bijal Jaimes [phone]       [address] [facility] [place] [address]        Equal Access to Services     ANDERSON ENCARNACION : Latha Kirby, wagalileo dong, qaybta kagualberto maynard. So [facility] [phone].    ATENCIÓN: Si habla " español, tiene a shepherd disposición servicios gratuitos de asistencia lingüística. Harinder enriquez [phone].    We comply with applicable federal civil rights laws and [place] laws. We do not discriminate on the basis of race, color, national origin, age, disability, sex, sexual orientation, or gender identity.            Thank you!     Thank you for choosing [facility]  for your care. Our goal is always to provide you with excellent care. Hearing back from our patients is one way we can continue to improve our services. Please take a few minutes to complete the written survey that you may receive in the mail after your visit with us. Thank you!             Your Updated Medication List - Protect others around you: Learn how to safely use, store and throw away your medicines at www.disposemymeds.org.      Notice  As of 9/6/2018  9:29 AM    You have not been prescribed any medications.

## 2018-09-06 NOTE — LETTER
[address] [place] [address]    September 6, 2018    [facility]  [address] [facility] [address]          Dear Michelle,  Cholesterol and Thyroid is normal.  Blood sugar is normal.  Enclosed is a copy of your results.     Results for orders placed or performed in visit on 09/06/18   Glucose   Result Value Ref Range    Glucose 92 70 - 99 mg/dL   Lipid panel reflex to direct LDL Fasting   Result Value Ref Range    Cholesterol 182 <200 mg/dL    Triglycerides 53 <150 mg/dL    HDL Cholesterol 76 >49 mg/dL    LDL Cholesterol Calculated 95 <100 mg/dL    Non HDL Cholesterol 106 <130 mg/dL   TSH with free T4 reflex   Result Value Ref Range    TSH 2.70 0.40 - 4.00 mU/L       If you have any questions or concerns, please call myself or my nurse at [phone].      Sincerely,        Bijal Jaimes MD/pb

## 2018-09-06 NOTE — PROGRESS NOTES

## 2018-09-10 ENCOUNTER — HEALTH MAINTENANCE LETTER (OUTPATIENT)
Age: 43
End: 2018-09-10

## 2018-09-10 LAB
COPATH REPORT: NORMAL
PAP: NORMAL

## 2018-09-12 LAB
FINAL DIAGNOSIS: NORMAL
HPV HR 12 DNA CVX QL NAA+PROBE: NEGATIVE
HPV16 DNA SPEC QL NAA+PROBE: NEGATIVE
HPV18 DNA SPEC QL NAA+PROBE: NEGATIVE
SPECIMEN DESCRIPTION: NORMAL
SPECIMEN SOURCE CVX/VAG CYTO: NORMAL

## 2018-09-25 ENCOUNTER — RADIANT APPOINTMENT (OUTPATIENT)
Dept: MAMMOGRAPHY | Facility: CLINIC | Age: 43
End: 2018-09-25
Attending: FAMILY MEDICINE
Payer: COMMERCIAL

## 2018-09-25 DIAGNOSIS — N60.22 LUMPY BREASTS, LEFT: ICD-10-CM

## 2019-10-28 ENCOUNTER — ANCILLARY PROCEDURE (OUTPATIENT)
Dept: MAMMOGRAPHY | Facility: CLINIC | Age: 44
End: 2019-10-28
Attending: FAMILY MEDICINE
Payer: COMMERCIAL

## 2019-10-28 ENCOUNTER — TRANSFERRED RECORDS (OUTPATIENT)
Dept: HEALTH INFORMATION MANAGEMENT | Facility: CLINIC | Age: 44
End: 2019-10-28

## 2019-10-28 DIAGNOSIS — Z12.31 VISIT FOR SCREENING MAMMOGRAM: ICD-10-CM

## 2019-11-03 ASSESSMENT — ENCOUNTER SYMPTOMS
DYSURIA: 0
CHILLS: 0
DIZZINESS: 0
BREAST MASS: 0
COUGH: 0
HEADACHES: 0
FREQUENCY: 0
HEARTBURN: 0
DIARRHEA: 0
NAUSEA: 0
PALPITATIONS: 0
WEAKNESS: 0
ABDOMINAL PAIN: 0
SORE THROAT: 0
MYALGIAS: 0
HEMATURIA: 0
HEMATOCHEZIA: 0
JOINT SWELLING: 0
PARESTHESIAS: 0
CONSTIPATION: 0
NERVOUS/ANXIOUS: 0
SHORTNESS OF BREATH: 0
EYE PAIN: 0
ARTHRALGIAS: 0
FEVER: 0

## 2019-11-05 ENCOUNTER — ANCILLARY PROCEDURE (OUTPATIENT)
Dept: MAMMOGRAPHY | Facility: CLINIC | Age: 44
End: 2019-11-05
Attending: FAMILY MEDICINE
Payer: COMMERCIAL

## 2019-11-05 DIAGNOSIS — R92.8 ABNORMAL MAMMOGRAM OF RIGHT BREAST: ICD-10-CM

## 2019-11-06 ENCOUNTER — OFFICE VISIT (OUTPATIENT)
Dept: FAMILY MEDICINE | Facility: CLINIC | Age: 44
End: 2019-11-06
Payer: COMMERCIAL

## 2019-11-06 VITALS
DIASTOLIC BLOOD PRESSURE: 78 MMHG | TEMPERATURE: 97.8 F | BODY MASS INDEX: 27.35 KG/M2 | RESPIRATION RATE: 16 BRPM | WEIGHT: 160.2 LBS | SYSTOLIC BLOOD PRESSURE: 118 MMHG | HEART RATE: 52 BPM | OXYGEN SATURATION: 99 % | HEIGHT: 64 IN

## 2019-11-06 DIAGNOSIS — Z00.00 ROUTINE HISTORY AND PHYSICAL EXAMINATION OF ADULT: ICD-10-CM

## 2019-11-06 DIAGNOSIS — Z12.11 SPECIAL SCREENING FOR MALIGNANT NEOPLASMS, COLON: ICD-10-CM

## 2019-11-06 DIAGNOSIS — L93.0 DISCOID LUPUS: ICD-10-CM

## 2019-11-06 DIAGNOSIS — E04.2 NONTOXIC MULTINODULAR GOITER: ICD-10-CM

## 2019-11-06 DIAGNOSIS — Z83.49 FAMILY HISTORY OF THYROID DISEASE: Primary | ICD-10-CM

## 2019-11-06 LAB
GLUCOSE SERPL-MCNC: 87 MG/DL (ref 70–99)
TSH SERPL DL<=0.005 MIU/L-ACNC: 1.54 MU/L (ref 0.4–4)

## 2019-11-06 PROCEDURE — 99396 PREV VISIT EST AGE 40-64: CPT | Performed by: FAMILY MEDICINE

## 2019-11-06 PROCEDURE — 84443 ASSAY THYROID STIM HORMONE: CPT | Performed by: FAMILY MEDICINE

## 2019-11-06 PROCEDURE — 82947 ASSAY GLUCOSE BLOOD QUANT: CPT | Performed by: FAMILY MEDICINE

## 2019-11-06 PROCEDURE — 36415 COLL VENOUS BLD VENIPUNCTURE: CPT | Performed by: FAMILY MEDICINE

## 2019-11-06 ASSESSMENT — ENCOUNTER SYMPTOMS
COUGH: 0
HEARTBURN: 0
NAUSEA: 0
MYALGIAS: 0
DYSURIA: 0
EYE PAIN: 0
HEMATURIA: 0
CONSTIPATION: 0
PARESTHESIAS: 0
FREQUENCY: 0
CHILLS: 0
HEMATOCHEZIA: 0
DIARRHEA: 0
NERVOUS/ANXIOUS: 0
WEAKNESS: 0
HEADACHES: 0
FEVER: 0
SORE THROAT: 0
PALPITATIONS: 0
ABDOMINAL PAIN: 0
SHORTNESS OF BREATH: 0
DIZZINESS: 0
JOINT SWELLING: 0
ARTHRALGIAS: 0
BREAST MASS: 0

## 2019-11-06 ASSESSMENT — MIFFLIN-ST. JEOR: SCORE: 1351.66

## 2019-11-06 NOTE — LETTER
[address] [place] [address]    November 6, 2019    Michelle [facility]  [address] [facility] [facility] [address]      Dear Michelle,    Thyroid and Blood sugar are normal     Enclosed is a copy of your results.   Results for orders placed or performed in visit on 11/06/19   TSH with free T4 reflex     Status: None   Result Value Ref Range    TSH 1.54 0.40 - 4.00 mU/L   Glucose     Status: None   Result Value Ref Range    Glucose 87 70 - 99 mg/dL       If you have any questions or concerns, please call myself or my nurse at [phone].    Sincerely,    Bijal Jaimes MD/elizabeth

## 2019-11-08 ENCOUNTER — HEALTH MAINTENANCE LETTER (OUTPATIENT)
Age: 44
End: 2019-11-08

## 2020-09-01 ENCOUNTER — IMMUNIZATION (OUTPATIENT)
Dept: NURSING | Facility: CLINIC | Age: 45
End: 2020-09-01
Payer: COMMERCIAL

## 2020-09-01 PROCEDURE — 90686 IIV4 VACC NO PRSV 0.5 ML IM: CPT

## 2020-09-01 PROCEDURE — 90471 IMMUNIZATION ADMIN: CPT

## 2020-11-17 ENCOUNTER — NURSE TRIAGE (OUTPATIENT)
Dept: NURSING | Facility: CLINIC | Age: 45
End: 2020-11-17

## 2020-11-17 NOTE — TELEPHONE ENCOUNTER
Physical at 2 pm     Should patient be fasting for her office visit?  Per appointment comment patient may need to be rescheduled. Transferred to scheduling to clarify.    Please advise on fasting and call patient.       Additional Information    Nursing judgment    Protocols used: NO PROTOCOL AVAILABLE - INFORMATION ONLY-A-OH

## 2020-12-02 ENCOUNTER — ANCILLARY PROCEDURE (OUTPATIENT)
Dept: MAMMOGRAPHY | Facility: CLINIC | Age: 45
End: 2020-12-02
Attending: FAMILY MEDICINE
Payer: COMMERCIAL

## 2020-12-02 DIAGNOSIS — Z12.31 VISIT FOR SCREENING MAMMOGRAM: ICD-10-CM

## 2020-12-02 PROCEDURE — 77063 BREAST TOMOSYNTHESIS BI: CPT | Mod: GC | Performed by: RADIOLOGY

## 2020-12-02 PROCEDURE — 77067 SCR MAMMO BI INCL CAD: CPT | Mod: GC | Performed by: RADIOLOGY

## 2021-01-14 ENCOUNTER — OFFICE VISIT (OUTPATIENT)
Dept: FAMILY MEDICINE | Facility: CLINIC | Age: 46
End: 2021-01-14
Payer: COMMERCIAL

## 2021-01-14 VITALS
WEIGHT: 164 LBS | TEMPERATURE: 98.8 F | HEART RATE: 58 BPM | HEIGHT: 64 IN | RESPIRATION RATE: 18 BRPM | DIASTOLIC BLOOD PRESSURE: 68 MMHG | BODY MASS INDEX: 28 KG/M2 | OXYGEN SATURATION: 98 % | SYSTOLIC BLOOD PRESSURE: 110 MMHG

## 2021-01-14 DIAGNOSIS — Z12.4 SCREENING FOR MALIGNANT NEOPLASM OF CERVIX: ICD-10-CM

## 2021-01-14 DIAGNOSIS — Z11.4 SCREENING FOR HIV (HUMAN IMMUNODEFICIENCY VIRUS): Primary | ICD-10-CM

## 2021-01-14 DIAGNOSIS — Z11.59 ENCOUNTER FOR HEPATITIS C SCREENING TEST FOR LOW RISK PATIENT: ICD-10-CM

## 2021-01-14 DIAGNOSIS — Z00.01 ENCOUNTER FOR ROUTINE ADULT PHYSICAL EXAM WITH ABNORMAL FINDINGS: ICD-10-CM

## 2021-01-14 DIAGNOSIS — C73 PAPILLARY THYROID CARCINOMA (H): ICD-10-CM

## 2021-01-14 DIAGNOSIS — E04.2 NONTOXIC MULTINODULAR GOITER: ICD-10-CM

## 2021-01-14 LAB
GLUCOSE SERPL-MCNC: 91 MG/DL (ref 70–99)
HCV AB SERPL QL IA: NONREACTIVE
HIV 1+2 AB+HIV1 P24 AG SERPL QL IA: NONREACTIVE
T3FREE SERPL-MCNC: 3.3 PG/ML (ref 2.3–4.2)
T4 FREE SERPL-MCNC: 1.08 NG/DL (ref 0.76–1.46)
TSH SERPL DL<=0.005 MIU/L-ACNC: 1.92 MU/L (ref 0.4–4)

## 2021-01-14 PROCEDURE — 87389 HIV-1 AG W/HIV-1&-2 AB AG IA: CPT | Performed by: FAMILY MEDICINE

## 2021-01-14 PROCEDURE — 99213 OFFICE O/P EST LOW 20 MIN: CPT | Mod: 25 | Performed by: FAMILY MEDICINE

## 2021-01-14 PROCEDURE — 84481 FREE ASSAY (FT-3): CPT | Performed by: FAMILY MEDICINE

## 2021-01-14 PROCEDURE — 99396 PREV VISIT EST AGE 40-64: CPT | Performed by: FAMILY MEDICINE

## 2021-01-14 PROCEDURE — 82947 ASSAY GLUCOSE BLOOD QUANT: CPT | Performed by: FAMILY MEDICINE

## 2021-01-14 PROCEDURE — 86803 HEPATITIS C AB TEST: CPT | Performed by: FAMILY MEDICINE

## 2021-01-14 PROCEDURE — 84443 ASSAY THYROID STIM HORMONE: CPT | Performed by: FAMILY MEDICINE

## 2021-01-14 PROCEDURE — 87624 HPV HI-RISK TYP POOLED RSLT: CPT | Performed by: FAMILY MEDICINE

## 2021-01-14 PROCEDURE — 36415 COLL VENOUS BLD VENIPUNCTURE: CPT | Performed by: FAMILY MEDICINE

## 2021-01-14 PROCEDURE — G0145 SCR C/V CYTO,THINLAYER,RESCR: HCPCS | Performed by: FAMILY MEDICINE

## 2021-01-14 PROCEDURE — 84439 ASSAY OF FREE THYROXINE: CPT | Performed by: FAMILY MEDICINE

## 2021-01-14 ASSESSMENT — ENCOUNTER SYMPTOMS
HEMATOCHEZIA: 0
FREQUENCY: 0
HEADACHES: 0
PARESTHESIAS: 0
JOINT SWELLING: 0
DYSURIA: 0
PALPITATIONS: 0
BREAST MASS: 0
HEARTBURN: 0
SHORTNESS OF BREATH: 0
CONSTIPATION: 0
COUGH: 0
CHILLS: 0
SORE THROAT: 0
MYALGIAS: 0
DIARRHEA: 0
EYE PAIN: 0
FEVER: 0
DIZZINESS: 0
ABDOMINAL PAIN: 0
ARTHRALGIAS: 0
WEAKNESS: 0
HEMATURIA: 0
NERVOUS/ANXIOUS: 1
NAUSEA: 0

## 2021-01-14 ASSESSMENT — PAIN SCALES - GENERAL: PAINLEVEL: NO PAIN (0)

## 2021-01-14 ASSESSMENT — MIFFLIN-ST. JEOR: SCORE: 1363.9

## 2021-01-14 NOTE — PROGRESS NOTES
SUBJECTIVE:   CC: Michelle Jalloh is an 45 year old woman who presents for preventive health visit.       Patient has been advised of split billing requirements and indicates understanding: Yes  Healthy Habits:     Getting at least 3 servings of Calcium per day:  Yes    Bi-annual eye exam:  Yes    Dental care twice a year:  Yes    Sleep apnea or symptoms of sleep apnea:  None    Diet:  Regular (no restrictions)    Frequency of exercise:  2-3 days/week    Duration of exercise:  30-45 minutes    Taking medications regularly:  Yes    Medication side effects:  None    PHQ-2 Total Score: 0    Additional concerns today:  No          Pt hasd papillary ca of hyroid  Doing well  She has not had follow up with Endocrine    Today's PHQ-2 Score:   PHQ-2 ( 1999 [employer]) 1/14/2021   Q1: Little interest or pleasure in doing things 0   Q2: Feeling down, depressed or hopeless 0   PHQ-2 Score 0   Q1: Little interest or pleasure in doing things Not at all   Q2: Feeling down, depressed or hopeless Not at all   PHQ-2 Score 0       Abuse: Current or Past (Physical, Sexual or Emotional) - No  Do you feel safe in your environment? Yes        Social History     Tobacco Use     Smoking status: Former Smoker     Start date: 1/29/1999     Smokeless tobacco: Never Used   Substance Use Topics     Alcohol use: Yes     Alcohol/week: 0.0 standard drinks     Comment: 1/week if that.     If you drink alcohol do you typically have >3 drinks per day or >7 drinks per week? No    Alcohol Use 1/14/2021   Prescreen: >3 drinks/day or >7 drinks/week? No   Prescreen: >3 drinks/day or >7 drinks/week? -   No flowsheet data found.    Reviewed orders with patient.  Reviewed health maintenance and updated orders accordingly - Yes  Lab work is in process  Labs reviewed in EPIC  BP Readings from Last 3 Encounters:   01/14/21 110/68   11/06/19 118/78   09/06/18 112/70    Wt Readings from Last 3 Encounters:   01/14/21 74.4 kg (164 lb)   11/06/19 72.7 kg (160 lb  3.2 oz)   18 70.9 kg (156 lb 6.4 oz)                  Patient Active Problem List   Diagnosis     CARDIOVASCULAR SCREENING; LDL GOAL LESS THAN 160     Family history of thyroid disease     Common wart     Discoid lupus     Nontoxic multinodular goiter     Anxiety state     Past Surgical History:   Procedure Laterality Date     APPENDECTOMY  18      SECTION       THYROIDECTOMY Right 2017    Procedure: THYROIDECTOMY;  Right Thyroid Lobectomy, Isthmysectomy;  Surgeon: Marisa Enamorado MD;  Location:  OR       Social History     Tobacco Use     Smoking status: Former Smoker     Start date: 1999     Smokeless tobacco: Never Used   Substance Use Topics     Alcohol use: Yes     Alcohol/week: 0.0 standard drinks     Comment: 1/week if that.     Family History   Problem Relation Age of Onset     Hypertension Father      Hyperthyroidism Mother         Thyroid     Hypertension Paternal Grandmother      Diabetes Paternal Grandfather      Colon Cancer No family hx of      Breast Cancer No family hx of      Thyroid Cancer No family hx of          Current Outpatient Medications   Medication Sig Dispense Refill     MULTIPLE VITAMIN PO Indications: Pt notes maybe [place] brand - PRN       Probiotic Product (PROBIOTIC DAILY PO) Take 1 capsule by mouth daily       No Known Allergies  Pertinent mammograms are reviewed under the imaging tab.  History of abnormal Pap smear: NO - age 30-65 PAP every 5 years with negative HPV co-testing recommended  PAP / HPV Latest Ref Rng & Units 2018   PAP - NIL NIL NIL   HPV 16 DNA NEG:Negative Negative - -   HPV 18 DNA NEG:Negative Negative - -   OTHER HR HPV NEG:Negative Negative - -     Reviewed and updated as needed this visit by clinical staff  Tobacco     Med Hx  Surg Hx  Fam Hx  Soc Hx        Reviewed and updated as needed this visit by Provider                Past Medical History:   Diagnosis Date     Motion sickness       Past Surgical  History:   Procedure Laterality Date     APPENDECTOMY  18      SECTION       THYROIDECTOMY Right 2017    Procedure: THYROIDECTOMY;  Right Thyroid Lobectomy, Isthmysectomy;  Surgeon: Marisa Enamorado MD;  Location:  OR       Review of Systems   Constitutional: Negative for chills and fever.   HENT: Negative for congestion, ear pain, hearing loss and sore throat.    Eyes: Positive for visual disturbance. Negative for pain.   Respiratory: Negative for cough and shortness of breath.    Cardiovascular: Negative for chest pain, palpitations and peripheral edema.   Gastrointestinal: Negative for abdominal pain, constipation, diarrhea, heartburn, hematochezia and nausea.   Breasts:  Negative for tenderness, breast mass and discharge.   Genitourinary: Negative for dysuria, frequency, genital sores, hematuria, pelvic pain, urgency, vaginal bleeding and vaginal discharge.   Musculoskeletal: Negative for arthralgias, joint swelling and myalgias.   Skin: Negative for rash.   Neurological: Negative for dizziness, weakness, headaches and paresthesias.   Psychiatric/Behavioral: Negative for mood changes. The patient is nervous/anxious.      CONSTITUTIONAL: NEGATIVE for fever, chills, change in weight  INTEGUMENTARU/SKIN: NEGATIVE for worrisome rashes, moles or lesions  EYES: NEGATIVE for vision changes or irritation  ENT: NEGATIVE for ear, mouth and throat problems  RESP: NEGATIVE for significant cough or SOB  BREAST: NEGATIVE for masses, tenderness or discharge  CV: NEGATIVE for chest pain, palpitations or peripheral edema  GI: NEGATIVE for nausea, abdominal pain, heartburn, or change in bowel habits  : NEGATIVE for unusual urinary or vaginal symptoms. Periods are regular.  MUSCULOSKELETAL: NEGATIVE for significant arthralgias or myalgia  NEURO: NEGATIVE for weakness, dizziness or paresthesias  PSYCHIATRIC: NEGATIVE for changes in mood or affect     OBJECTIVE:   There were no vitals taken for this  visit.  Physical Exam  GENERAL: healthy, alert and no distress  EYES: Eyes grossly normal to inspection, PERRL and conjunctivae and sclerae normal  HENT: ear canals and TM's normal, nose and mouth without ulcers or lesions  NECK: no adenopathy, no asymmetry, masses, or scars and thyroid normal to palpation  RESP: lungs clear to auscultation - no rales, rhonchi or wheezes  BREAST: normal without masses, tenderness or nipple discharge and no palpable axillary masses or adenopathy  CV: regular rate and rhythm, normal S1 S2, no S3 or S4, no murmur, click or rub, no peripheral edema and peripheral pulses strong  ABDOMEN: soft, nontender, no hepatosplenomegaly, no masses and bowel sounds normal   (female): normal female external genitalia, normal urethral meatus, vaginal mucosa pink, moist, well rugated, and normal cervix/adnexa/uterus without masses or discharge  MS: no gross musculoskeletal defects noted, no edema  SKIN: no suspicious lesions or rashes  NEURO: Normal strength and tone, mentation intact and speech normal  PSYCH: mentation appears normal, affect normal/bright    Diagnostic Test Results:  Labs reviewed in Epic  Pending     ASSESSMENT/PLAN:   1. Encounter for routine adult physical exam with abnormal findings      2. Papillary thyroid carcinoma (H)  Advised follow up endocrine  I did do the labs Today  - ENDOCRINOLOGY ADULT REFERRAL    3. Nontoxic multinodular goiter  Pending   - TSH with free T4 reflex  - T3, Free  - T4, free    4. Screening for HIV (human immunodeficiency virus)  Discussed   - HIV Antigen Antibody Combo    5. Encounter for hepatitis C screening test for low risk patient  Discussed   - **Hepatitis C Screen Reflex to RNA FUTURE anytime    6. Screening for malignant neoplasm of cervix  Pap done  - Pap imaged thin layer screen with HPV - recommended age 30 - 65 years (select HPV order below)  - HPV High Risk Types DNA Cervical      COUNSELING:  Reviewed preventive health counseling, as  "reflected in patient instructions       Regular exercise       Healthy diet/nutrition       Contraception    Estimated body mass index is 27.5 kg/m  as calculated from the following:    Height as of 11/6/19: 1.626 m (5' 4\").    Weight as of 11/6/19: 72.7 kg (160 lb 3.2 oz).    Weight management plan: exercise/watch diet    She reports that she has quit smoking. She started smoking about 21 years ago. She has never used smokeless tobacco.    Advised covid shot when available  Counseling Resources:  ATP IV Guidelines  Pooled Cohorts Equation Calculator  Breast Cancer Risk Calculator  BRCA-Related Cancer Risk Assessment: FHS-7 Tool  FRAX Risk Assessment  ICSI Preventive Guidelines  Dietary Guidelines for Americans, 2010  USDA's MyPlate  ASA Prophylaxis  Lung CA Screening    Bijal Jaimes MD  [facility]  "

## 2021-01-18 LAB
COPATH REPORT: NORMAL
PAP: NORMAL

## 2021-06-09 ENCOUNTER — VIRTUAL VISIT (OUTPATIENT)
Dept: ENDOCRINOLOGY | Facility: CLINIC | Age: 46
End: 2021-06-09
Attending: FAMILY MEDICINE
Payer: COMMERCIAL

## 2021-06-09 DIAGNOSIS — C73 PAPILLARY THYROID CARCINOMA (H): Primary | ICD-10-CM

## 2021-06-09 PROCEDURE — 99204 OFFICE O/P NEW MOD 45 MIN: CPT | Mod: GT | Performed by: INTERNAL MEDICINE

## 2021-06-09 NOTE — PROGRESS NOTES
Michelle is a 46 year old who is being evaluated via a billable video visit.      How would you like to obtain your AVS? MyChart  If the video visit is dropped, the invitation should be resent by:   Will anyone else be joining your video visit? No      Video Start Time: 11:12 AM  CC: PTC    HPI:   Patient presents for management of papillary thyroid cancer.   Nodules first found in 2013.   In 2016, met with Dr Rdz. FNA of right nodule was suspicious for papillary thyroid cancer.     She had surgery on 6/27/17 with Dr Enamorado:  Right thyroid lobe and isthmus:   - Microscopic papillary thyroid carcinoma        - Tumor focality: One focus in the right lobe        - Largest tumor size: 0.4 cm        - Histologic type: Papillary carcinoma        - Variant: Classical        - Architecture: Papillary        - Cytomorphology: Classical        - Margins: Uninvolved by carcinoma        - The distance of tumor from closest margin: 0.1 cm from anterior   margin        - Tumor capsule: Partially present        - Tumor capsule invasion: Identified        - Lymph-vascular invasion: Not identified        - Perineural invasion: Not identified        - Extrathyroidal extension: Not identified     Report Name: Thyroid Gland - Resection   Status: Submitted     Part(s) Involved:   A: Right thyroid lobe and isthmus     Synoptic Report:     SPECIMEN     Procedure:         - Lobectomy with isthmusectomy (hemithyroidectomy)       Side:           - Right     TUMOR     Histologic Type:         - Papillary carcinoma       Common Significant Variants:           - Classical (usual, conventional)     Tumor Size: 0.4 cm     Tumor Laterality:         - Right lobe     Tumor Focality:         - Unifocal     Tumor Extent       Extrathyroidal Extension:           - Not identified     Accessory Tumor Findings       Angioinvasion (vascular invasion):           - Not identified       Lymphatic Invasion:           - Not identified     MARGINS      Margins:         - Margins uninvolved by carcinoma     LYMPH NODES     Regional Lymph Nodes:         - No nodes submitted or found     STAGE (PTNM)     Primary Tumor (pT):         - pT1a:  Tumor 1 cm or less in greatest dimension limited to the   thyroid.     Regional Lymph Nodes (pN):         - pNX: Regional lymph nodes cannot be assessed     Total thyroidectomy was discussed  She has not had any dysphagia or neck pain.   Tends to be warmer than others. Menses are regular.   She has gained 5 pounds since COVID began.     ROS: 10 point ROS neg other than the symptoms noted above in the HPI.    PMH:   Patient Active Problem List   Diagnosis     CARDIOVASCULAR SCREENING; LDL GOAL LESS THAN 160     Family history of thyroid disease     Common wart     Discoid lupus     Nontoxic multinodular goiter     Anxiety state      Meds:  Current Outpatient Medications   Medication     MULTIPLE VITAMIN PO     Probiotic Product (PROBIOTIC DAILY PO)     No current facility-administered medications for this visit.       FHX:   Mother had hyperthyroidism    SHX:  Smoked tobacco from age [age].   No known radiation exposure.     Exam:   GENERAL: Healthy, alert and no distress  EYES: Eyes grossly normal to inspection.  No discharge or erythema, or obvious scleral/conjunctival abnormalities.  HENT: Normal cephalic/atraumatic.  External ears, nose and mouth without ulcers or lesions.  No nasal drainage visible.  RESP: No audible wheeze, cough, or visible cyanosis.  No visible retractions or increased work of breathing.    MS: No gross musculoskeletal defects noted.  Normal range of motion.  No visible edema.  SKIN: Visible skin clear. No significant rash, abnormal pigmentation or lesions.  NEURO: Cranial nerves grossly intact.  Mentation and speech appropriate for age.  PSYCH: Mentation appears normal, affect normal/bright, judgement and insight intact, normal speech and appearance well-groomed.     A/P:   Papillary thyroid cancer -  Surgery on 6/27/17: [id number]. Tumor capsule invasion noted but she did have clear margins. No follow up since. Discussed need for imaging and labs. Natural history and management of thyroid cancer reviewed.   -Schedule thyroid ultrasound.  -Schedule labs.       Rey Sinclair M.D     Video-Visit Details    Type of service:  Video Visit    Video End Time:11:27 AM    Originating Location (pt. Location): Home    Distant Location (provider location):  [facility]     Platform used for Video Visit: [employer]

## 2021-06-09 NOTE — LETTER
6/9/2021         RE: Michelle Jalloh  [address] [id number]        Dear Colleague,    Thank you for referring your patient, Michelle Jalloh, to the [facility]. Please see a copy of my visit note below.    Michelle is a 46 year old who is being evaluated via a billable video visit.      How would you like to obtain your AVS? MyChart  If the video visit is dropped, the invitation should be resent by:   Will anyone else be joining your video visit? No      Video Start Time: 11:12 AM  CC: PTC    HPI:   Patient presents for management of papillary thyroid cancer.   Nodules first found in 2013.   In 2016, met with Dr Rdz. FNA of right nodule was suspicious for papillary thyroid cancer.     She had surgery on 6/27/17 with Dr Enamorado:  Right thyroid lobe and isthmus:   - Microscopic papillary thyroid carcinoma        - Tumor focality: One focus in the right lobe        - Largest tumor size: 0.4 cm        - Histologic type: Papillary carcinoma        - Variant: Classical        - Architecture: Papillary        - Cytomorphology: Classical        - Margins: Uninvolved by carcinoma        - The distance of tumor from closest margin: 0.1 cm from anterior   margin        - Tumor capsule: Partially present        - Tumor capsule invasion: Identified        - Lymph-vascular invasion: Not identified        - Perineural invasion: Not identified        - Extrathyroidal extension: Not identified     Report Name: Thyroid Gland - Resection   Status: Submitted     Part(s) Involved:   A: Right thyroid lobe and isthmus     Synoptic Report:     SPECIMEN     Procedure:         - Lobectomy with isthmusectomy (hemithyroidectomy)       Side:           - Right     TUMOR     Histologic Type:         - Papillary carcinoma       Common Significant Variants:           - Classical (usual, conventional)     Tumor Size: 0.4 cm     Tumor Laterality:         - Right lobe     Tumor  Focality:         - Unifocal     Tumor Extent       Extrathyroidal Extension:           - Not identified     Accessory Tumor Findings       Angioinvasion (vascular invasion):           - Not identified       Lymphatic Invasion:           - Not identified     MARGINS     Margins:         - Margins uninvolved by carcinoma     LYMPH NODES     Regional Lymph Nodes:         - No nodes submitted or found     STAGE (PTNM)     Primary Tumor (pT):         - pT1a:  Tumor 1 cm or less in greatest dimension limited to the   thyroid.     Regional Lymph Nodes (pN):         - pNX: Regional lymph nodes cannot be assessed     Total thyroidectomy was discussed  She has not had any dysphagia or neck pain.   Tends to be warmer than others. Menses are regular.   She has gained 5 pounds since COVID began.     ROS: 10 point ROS neg other than the symptoms noted above in the HPI.    PMH:   Patient Active Problem List   Diagnosis     CARDIOVASCULAR SCREENING; LDL GOAL LESS THAN 160     Family history of thyroid disease     Common wart     Discoid lupus     Nontoxic multinodular goiter     Anxiety state      Meds:  Current Outpatient Medications   Medication     MULTIPLE VITAMIN PO     Probiotic Product (PROBIOTIC DAILY PO)     No current facility-administered medications for this visit.       FHX:   Mother had hyperthyroidism    SHX:  Smoked tobacco from age [age].   No known radiation exposure.     Exam:   GENERAL: Healthy, alert and no distress  EYES: Eyes grossly normal to inspection.  No discharge or erythema, or obvious scleral/conjunctival abnormalities.  HENT: Normal cephalic/atraumatic.  External ears, nose and mouth without ulcers or lesions.  No nasal drainage visible.  RESP: No audible wheeze, cough, or visible cyanosis.  No visible retractions or increased work of breathing.    MS: No gross musculoskeletal defects noted.  Normal range of motion.  No visible edema.  SKIN: Visible skin clear. No significant rash, abnormal  pigmentation or lesions.  NEURO: Cranial nerves grossly intact.  Mentation and speech appropriate for age.  PSYCH: Mentation appears normal, affect normal/bright, judgement and insight intact, normal speech and appearance well-groomed.     A/P:   Papillary thyroid cancer - Surgery on 6/27/17: [id number]. Tumor capsule invasion noted but she did have clear margins. No follow up since. Discussed need for imaging and labs. Natural history and management of thyroid cancer reviewed.   -Schedule thyroid ultrasound.  -Schedule labs.       Rey Sinclair M.D     Video-Visit Details    Type of service:  Video Visit    Video End Time:11:27 AM    Originating Location (pt. Location): Home    Distant Location (provider location):  [facility]     Platform used for Video Visit: [facility]        Again, thank you for allowing me to participate in the care of your patient.        Sincerely,        Rey Sinclair MD

## 2021-06-18 ENCOUNTER — ANCILLARY PROCEDURE (OUTPATIENT)
Dept: ULTRASOUND IMAGING | Facility: CLINIC | Age: 46
End: 2021-06-18
Attending: INTERNAL MEDICINE
Payer: COMMERCIAL

## 2021-06-18 DIAGNOSIS — C73 PAPILLARY THYROID CARCINOMA (H): ICD-10-CM

## 2021-06-18 LAB — TSH SERPL DL<=0.005 MIU/L-ACNC: 1.63 MU/L (ref 0.4–4)

## 2021-06-18 PROCEDURE — 84443 ASSAY THYROID STIM HORMONE: CPT | Performed by: INTERNAL MEDICINE

## 2021-06-18 PROCEDURE — 76536 US EXAM OF HEAD AND NECK: CPT | Performed by: RADIOLOGY

## 2021-06-18 PROCEDURE — 36415 COLL VENOUS BLD VENIPUNCTURE: CPT | Performed by: INTERNAL MEDICINE

## 2021-06-21 DIAGNOSIS — C73 PAPILLARY THYROID CARCINOMA (H): Primary | ICD-10-CM

## 2021-09-25 ENCOUNTER — HEALTH MAINTENANCE LETTER (OUTPATIENT)
Age: 46
End: 2021-09-25

## 2021-12-14 ENCOUNTER — ANCILLARY PROCEDURE (OUTPATIENT)
Dept: ULTRASOUND IMAGING | Facility: CLINIC | Age: 46
End: 2021-12-14
Attending: INTERNAL MEDICINE
Payer: COMMERCIAL

## 2021-12-14 ENCOUNTER — ANCILLARY PROCEDURE (OUTPATIENT)
Dept: CT IMAGING | Facility: CLINIC | Age: 46
End: 2021-12-14
Attending: INTERNAL MEDICINE
Payer: COMMERCIAL

## 2021-12-14 DIAGNOSIS — C73 PAPILLARY THYROID CARCINOMA (H): Primary | ICD-10-CM

## 2021-12-14 DIAGNOSIS — C73 PAPILLARY THYROID CARCINOMA (H): ICD-10-CM

## 2021-12-14 PROCEDURE — 76536 US EXAM OF HEAD AND NECK: CPT | Performed by: RADIOLOGY

## 2021-12-14 PROCEDURE — 70491 CT SOFT TISSUE NECK W/DYE: CPT | Mod: TC | Performed by: RADIOLOGY

## 2021-12-14 RX ORDER — IOPAMIDOL 755 MG/ML
500 INJECTION, SOLUTION INTRAVASCULAR ONCE
Status: COMPLETED | OUTPATIENT
Start: 2021-12-14 | End: 2021-12-14

## 2021-12-14 RX ADMIN — Medication 50 ML: at 16:09

## 2021-12-14 RX ADMIN — IOPAMIDOL 80 ML: 755 INJECTION, SOLUTION INTRAVASCULAR at 16:08

## 2021-12-16 DIAGNOSIS — C73 PAPILLARY THYROID CARCINOMA (H): Primary | ICD-10-CM

## 2021-12-17 ENCOUNTER — LAB (OUTPATIENT)
Dept: LAB | Facility: CLINIC | Age: 46
End: 2021-12-17
Payer: COMMERCIAL

## 2021-12-17 DIAGNOSIS — C73 PAPILLARY THYROID CARCINOMA (H): ICD-10-CM

## 2021-12-17 LAB
ANION GAP SERPL CALCULATED.3IONS-SCNC: 6 MMOL/L (ref 3–14)
BUN SERPL-MCNC: 20 MG/DL (ref 7–30)
CALCIUM SERPL-MCNC: 9.2 MG/DL (ref 8.5–10.1)
CHLORIDE BLD-SCNC: 104 MMOL/L (ref 94–109)
CO2 SERPL-SCNC: 29 MMOL/L (ref 20–32)
CREAT SERPL-MCNC: 0.61 MG/DL (ref 0.52–1.04)
GFR SERPL CREATININE-BSD FRML MDRD: >90 ML/MIN/1.73M2
GLUCOSE BLD-MCNC: 92 MG/DL (ref 70–99)
POTASSIUM BLD-SCNC: 4 MMOL/L (ref 3.4–5.3)
SODIUM SERPL-SCNC: 139 MMOL/L (ref 133–144)
TSH SERPL DL<=0.005 MIU/L-ACNC: 2.01 MU/L (ref 0.4–4)

## 2021-12-17 PROCEDURE — 84443 ASSAY THYROID STIM HORMONE: CPT

## 2021-12-17 PROCEDURE — 80048 BASIC METABOLIC PNL TOTAL CA: CPT

## 2021-12-17 PROCEDURE — 36415 COLL VENOUS BLD VENIPUNCTURE: CPT

## 2022-02-17 ENCOUNTER — ANCILLARY PROCEDURE (OUTPATIENT)
Dept: CT IMAGING | Facility: CLINIC | Age: 47
End: 2022-02-17
Attending: INTERNAL MEDICINE
Payer: COMMERCIAL

## 2022-02-17 DIAGNOSIS — C73 PAPILLARY THYROID CARCINOMA (H): ICD-10-CM

## 2022-02-17 PROCEDURE — 70491 CT SOFT TISSUE NECK W/DYE: CPT | Mod: TC | Performed by: RADIOLOGY

## 2022-02-17 RX ORDER — IOPAMIDOL 755 MG/ML
100 INJECTION, SOLUTION INTRAVASCULAR ONCE
Status: COMPLETED | OUTPATIENT
Start: 2022-02-17 | End: 2022-02-17

## 2022-02-17 RX ADMIN — IOPAMIDOL 80 ML: 755 INJECTION, SOLUTION INTRAVASCULAR at 17:08

## 2022-02-17 RX ADMIN — Medication 50 ML: at 17:08

## 2022-02-21 DIAGNOSIS — C73 PAPILLARY THYROID CARCINOMA (H): Primary | ICD-10-CM

## 2022-03-04 ENCOUNTER — ANCILLARY PROCEDURE (OUTPATIENT)
Dept: MAMMOGRAPHY | Facility: CLINIC | Age: 47
End: 2022-03-04
Attending: FAMILY MEDICINE
Payer: COMMERCIAL

## 2022-03-04 DIAGNOSIS — Z12.31 VISIT FOR SCREENING MAMMOGRAM: ICD-10-CM

## 2022-03-04 PROCEDURE — 77067 SCR MAMMO BI INCL CAD: CPT | Mod: TC | Performed by: RADIOLOGY

## 2022-03-04 PROCEDURE — 77063 BREAST TOMOSYNTHESIS BI: CPT | Mod: TC | Performed by: RADIOLOGY

## 2022-03-12 ENCOUNTER — HEALTH MAINTENANCE LETTER (OUTPATIENT)
Age: 47
End: 2022-03-12

## 2022-08-15 ENCOUNTER — OFFICE VISIT (OUTPATIENT)
Dept: FAMILY MEDICINE | Facility: CLINIC | Age: 47
End: 2022-08-15
Payer: COMMERCIAL

## 2022-08-15 VITALS
BODY MASS INDEX: 28.95 KG/M2 | DIASTOLIC BLOOD PRESSURE: 72 MMHG | WEIGHT: 169.6 LBS | SYSTOLIC BLOOD PRESSURE: 126 MMHG | HEART RATE: 52 BPM | HEIGHT: 64 IN | OXYGEN SATURATION: 98 % | TEMPERATURE: 97.2 F

## 2022-08-15 DIAGNOSIS — Z00.01 ENCOUNTER FOR ROUTINE ADULT PHYSICAL EXAM WITH ABNORMAL FINDINGS: ICD-10-CM

## 2022-08-15 DIAGNOSIS — C73 PAPILLARY THYROID CARCINOMA (H): ICD-10-CM

## 2022-08-15 DIAGNOSIS — Z12.11 SCREEN FOR COLON CANCER: ICD-10-CM

## 2022-08-15 LAB
BUN SERPL-MCNC: 20 MG/DL (ref 7–30)
CREAT SERPL-MCNC: 0.72 MG/DL (ref 0.52–1.04)
FASTING STATUS PATIENT QL REPORTED: NO
GFR SERPL CREATININE-BSD FRML MDRD: >90 ML/MIN/1.73M2
GLUCOSE BLD-MCNC: 97 MG/DL (ref 70–99)
TSH SERPL DL<=0.005 MIU/L-ACNC: 2.21 MU/L (ref 0.4–4)

## 2022-08-15 PROCEDURE — 84443 ASSAY THYROID STIM HORMONE: CPT | Performed by: FAMILY MEDICINE

## 2022-08-15 PROCEDURE — 99396 PREV VISIT EST AGE 40-64: CPT | Mod: 25 | Performed by: FAMILY MEDICINE

## 2022-08-15 PROCEDURE — 84520 ASSAY OF UREA NITROGEN: CPT | Performed by: FAMILY MEDICINE

## 2022-08-15 PROCEDURE — 82565 ASSAY OF CREATININE: CPT | Performed by: FAMILY MEDICINE

## 2022-08-15 PROCEDURE — 99213 OFFICE O/P EST LOW 20 MIN: CPT | Mod: 25 | Performed by: FAMILY MEDICINE

## 2022-08-15 PROCEDURE — 36415 COLL VENOUS BLD VENIPUNCTURE: CPT | Performed by: FAMILY MEDICINE

## 2022-08-15 PROCEDURE — 90715 TDAP VACCINE 7 YRS/> IM: CPT | Performed by: FAMILY MEDICINE

## 2022-08-15 PROCEDURE — 82947 ASSAY GLUCOSE BLOOD QUANT: CPT | Performed by: FAMILY MEDICINE

## 2022-08-15 PROCEDURE — 90471 IMMUNIZATION ADMIN: CPT | Performed by: FAMILY MEDICINE

## 2022-08-15 ASSESSMENT — ENCOUNTER SYMPTOMS
BREAST MASS: 0
FREQUENCY: 0
DYSURIA: 0
NERVOUS/ANXIOUS: 0
SHORTNESS OF BREATH: 0
FEVER: 0
COUGH: 0
HEARTBURN: 0
SORE THROAT: 0
CHILLS: 0
DIARRHEA: 0
WEAKNESS: 0
EYE PAIN: 0
CONSTIPATION: 0
MYALGIAS: 0
HEMATOCHEZIA: 0
PARESTHESIAS: 0
DIZZINESS: 0
ABDOMINAL PAIN: 0
HEMATURIA: 0
ARTHRALGIAS: 0
NAUSEA: 0
PALPITATIONS: 0
HEADACHES: 0
JOINT SWELLING: 0

## 2022-08-15 ASSESSMENT — PAIN SCALES - GENERAL: PAINLEVEL: NO PAIN (0)

## 2022-08-15 NOTE — PROGRESS NOTES
SUBJECTIVE:   CC: Michelle Jalloh is an 47 year old woman who presents for preventive health visit.     Patient has been advised of split billing requirements and indicates understanding: Yes  Healthy Habits:     Getting at least 3 servings of Calcium per day:  Yes    Bi-annual eye exam:  Yes    Dental care twice a year:  Yes    Sleep apnea or symptoms of sleep apnea:  None    Diet:  Regular (no restrictions)    Frequency of exercise:  6-7 days/week    Duration of exercise:  30-45 minutes    Taking medications regularly:  Yes    Medication side effects:  None    PHQ-2 Total Score: 0    Additional concerns today:  Yes    Pt has Papillary  Thyroid Ca  Used to see Dr Sinclair  Needs referral  Is doing well except weight gain  Exercises daily    Today's PHQ-2 Score:   PHQ-2 ( 1999 [employer]) 8/15/2022   Q1: Little interest or pleasure in doing things 0   Q2: Feeling down, depressed or hopeless 0   PHQ-2 Score 0   PHQ-2 Total Score (12-17 Years)- Positive if 3 or more points; Administer PHQ-A if positive -   Q1: Little interest or pleasure in doing things Not at all   Q2: Feeling down, depressed or hopeless Not at all   PHQ-2 Score 0       Abuse: Current or Past (Physical, Sexual or Emotional) - No  Do you feel safe in your environment? Yes    Have you ever done Advance Care Planning? (For example, a Health Directive, POLST, or a discussion with a medical provider or your loved ones about your wishes): No, advance care planning information given to patient to review.  Advanced care planning was discussed at today's visit.-info given    Social History     Tobacco Use     Smoking status: Former Smoker     Start date: 1/29/1999     Smokeless tobacco: Never Used   Substance Use Topics     Alcohol use: Yes     Alcohol/week: 0.0 standard drinks     Comment: 1/week if that.         Alcohol Use 8/15/2022   Prescreen: >3 drinks/day or >7 drinks/week? No   Prescreen: >3 drinks/day or >7 drinks/week? -       Reviewed orders with  patient.  Reviewed health maintenance and updated orders accordingly - Yes  Lab work is in process  Labs reviewed in EPIC  BP Readings from Last 3 Encounters:   08/15/22 126/72   21 110/68   19 118/78    Wt Readings from Last 3 Encounters:   08/15/22 76.9 kg (169 lb 9.6 oz)   21 74.4 kg (164 lb)   19 72.7 kg (160 lb 3.2 oz)                  Patient Active Problem List   Diagnosis     CARDIOVASCULAR SCREENING; LDL GOAL LESS THAN 160     Family history of thyroid disease     Common wart     Discoid lupus     Nontoxic multinodular goiter     Anxiety state     Papillary thyroid carcinoma (H)     Past Surgical History:   Procedure Laterality Date     APPENDECTOMY  18      SECTION       THYROIDECTOMY Right 2017    Procedure: THYROIDECTOMY;  Right Thyroid Lobectomy, Isthmysectomy;  Surgeon: Marisa Enamorado MD;  Location:  OR       Social History     Tobacco Use     Smoking status: Former Smoker     Start date: 1999     Smokeless tobacco: Never Used   Substance Use Topics     Alcohol use: Yes     Alcohol/week: 0.0 standard drinks     Comment: 1/week if that.     Family History   Problem Relation Age of Onset     Hypertension Father      Hyperthyroidism Mother         Thyroid     Hypertension Paternal Grandmother      Diabetes Paternal Grandfather      Colon Cancer No family hx of      Breast Cancer No family hx of      Thyroid Cancer No family hx of          Current Outpatient Medications   Medication Sig Dispense Refill     MULTIPLE VITAMIN PO Indications: Pt notes maybe [employer] brand - PRN       Probiotic Product (PROBIOTIC DAILY PO) Take 1 capsule by mouth daily       No Known Allergies    Breast Cancer Screening:    Breast CA Risk Assessment (FHS-7) 8/15/2022   Do you have a family history of breast, colon, or ovarian cancer? No / Unknown         pt has had mammogram  Pertinent mammograms are reviewed under the imaging tab.    History of abnormal Pap smear: NO - age 28-  "63 PAP every 3 years recommended  PAP / HPV Latest Ref Rng & Units 2021   PAP (Historical) - NIL NIL NIL   HPV16 NEG:Negative Negative Negative -   HPV18 NEG:Negative Negative Negative -   HRHPV NEG:Negative Negative Negative -     Reviewed and updated as needed this visit by clinical staff   Tobacco  Allergies  Meds  Problems  Med Hx  Surg Hx  Fam Hx            Reviewed and updated as needed this visit by Provider   Tobacco  Allergies  Meds  Problems  Med Hx  Surg Hx  Fam Hx           Past Medical History:   Diagnosis Date     Motion sickness       Past Surgical History:   Procedure Laterality Date     APPENDECTOMY  18      SECTION       THYROIDECTOMY Right 2017    Procedure: THYROIDECTOMY;  Right Thyroid Lobectomy, Isthmysectomy;  Surgeon: Marisa Enamorado MD;  Location:  OR       Review of Systems   Constitutional: Negative for chills and fever.   HENT: Negative for congestion, ear pain, hearing loss and sore throat.    Eyes: Negative for pain and visual disturbance.   Respiratory: Negative for cough and shortness of breath.    Cardiovascular: Negative for chest pain, palpitations and peripheral edema.   Gastrointestinal: Negative for abdominal pain, constipation, diarrhea, heartburn, hematochezia and nausea.   Breasts:  Negative for tenderness, breast mass and discharge.   Genitourinary: Negative for dysuria, frequency, genital sores, hematuria, pelvic pain, urgency and vaginal discharge.   Musculoskeletal: Negative for arthralgias, joint swelling and myalgias.   Skin: Negative for rash.   Neurological: Negative for dizziness, weakness, headaches and paresthesias.   Psychiatric/Behavioral: Negative for mood changes. The patient is not nervous/anxious.    Rest of the ROS is Negative except see above and Problem list [stable]     OBJECTIVE:   /72   Pulse 52   Temp 97.2  F (36.2  C) (Temporal)   Ht 1.62 m (5' 3.78\")   Wt 76.9 kg (169 lb 9.6 oz)   " SpO2 98%   BMI 29.31 kg/m    Physical Exam  GENERAL APPEARANCE: healthy, alert and no distress  EYES: Eyes grossly normal to inspection, PERRL and conjunctivae and sclerae normal  HENT: ear canals and TM's normal, nose and mouth without ulcers or lesions, oropharynx clear and oral mucous membranes moist  NECK: no adenopathy, no asymmetry, masses, or scars and thyroid normal to palpation  RESP: lungs clear to auscultation - no rales, rhonchi or wheezes  BREAST: normal without masses, tenderness or nipple discharge and no palpable axillary masses or adenopathy  CV: regular rate and rhythm, normal S1 S2, no S3 or S4, no murmur, click or rub, no peripheral edema and peripheral pulses strong  ABDOMEN: soft, nontender, no hepatosplenomegaly, no masses and bowel sounds normal  MS: no musculoskeletal defects are noted and gait is age appropriate without ataxia  SKIN: no suspicious lesions or rashes  NEURO: Normal strength and tone, sensory exam grossly normal, mentation intact and speech normal  PSYCH: mentation appears normal and affect normal/bright    Diagnostic Test Results:  Labs reviewed in Epic  Pending     ASSESSMENT/PLAN:   (Z00.01) Encounter for routine adult physical exam with abnormal findings  Comment:   Plan: Glucose            (C73) Papillary thyroid carcinoma (H)  Comment: referral done  Plan: US Thyroid, TSH with free T4 reflex, Urea         nitrogen, Creatinine, Adult Endocrinology          Referral        Pt wlll make appointment endocrine  Labs pending     (Z12.11) Screen for colon cancer  Comment: advised colonoscopy or FIT or cologuard  Plan: Adult GI  Referral - Procedure Only        Referral done        COUNSELING:  Reviewed preventive health counseling, as reflected in patient instructions       Regular exercise       Healthy diet/nutrition       Osteoporosis prevention/bone health       Colorectal Cancer Screening    Estimated body mass index is 29.31 kg/m  as calculated from the  "following:    Height as of this encounter: 1.62 m (5' 3.78\").    Weight as of this encounter: 76.9 kg (169 lb 9.6 oz).    Weight management plan: pt exercises    She reports that she has quit smoking. She started smoking about 23 years ago. She has never used smokeless tobacco.      Counseling Resources:  ATP IV Guidelines  Pooled Cohorts Equation Calculator  Breast Cancer Risk Calculator  BRCA-Related Cancer Risk Assessment: FHS-7 Tool  FRAX Risk Assessment  ICSI Preventive Guidelines  Dietary Guidelines for Americans, 2010  USDA's MyPlate  ASA Prophylaxis  Lung CA Screening    Bijal Jaimes MD  [facility]  "

## 2022-08-15 NOTE — TELEPHONE ENCOUNTER
DX, Referring NOTES: Papillary Thyroid Carcinoma; referred by Dr. Jaimes    For Cancer Patients: Need the original operative and surgical pathology reports and all imaging reports/images related to the disease (includes all thyroid US, nuclear thyroid and total body scans, PET scans, chest CT reports since prior to the diagnosis ).   APPT DATE: 2022   NOTES (FOR ALL VISITS) STATUS DETAILS   OFFICE NOTES from referring provider [facility]:  8/15/22, 21 - PCC OV with Dr. Jaimes   OFFICE NOTES from other specialist Care Everywhere / [facility]:  21 - ENDO OV with Dr. Dottie Batemanina:  17 - PCC OV with Dr. Fernandez    [facility] - M17, 3/13/17 - SURG OV with Dr. Enamorado   ED NOTES N/A    OPERATIVE REPORT  (thyroid, pituitary, adrenal, parathyroid)  (All op notes related to diagnoses) Internal [facility]:  17 - OP Note for RIGHT THYROID LOBECTOMY, ISTHMYSECTOMY with Dr. Enamorado   MEDICATION LIST Internal    IMAGING      CT (HEAD/NECK/CHEST/ABDOMEN) Internal [facility]:  22 - CT Neck  21 - CT Neck   ULTRASOUND (HEAD/NECK)  * Include FNAs [facility]:  21 - US Head/Neck  21 - US Thyroid  21 - US Head/Neck  21 - US Thyroid  17 - US Lymph Node FNA  16 - US Neck  16 - US Thyroid FNA  16 - US Thyroid  13 - US Thyroid   LABS     DIABETES: HBGA1C, CREATININE, FASTING LIPIDS, MICROALBUMIN URINE, POTASSIUM, TSH, T4    THYROID: TSH, T4, CBC, THYRODLONULIN, TOTAL T3, FREE T4, CALCITONIN, CEA [facility]:  21 - BMP  21 - TSH, T4  21 - Glucose  18 - Lipid  17 - Thyroid Peroxidase  17 - HBGA1C   PATHOLOGY REPORTS WITH CASE NUMBER  *Surgical path reports for endocrine organs (ovaries, testes, pancreas, etc) Internal   [facility]:  17 - Right Thyroid Biopsy (Case: [id number])  17 - Lymph Node FNA (Case: [id number])  16 - Thyroid FNA (Case: [id number])

## 2022-09-01 ENCOUNTER — TELEPHONE (OUTPATIENT)
Dept: GASTROENTEROLOGY | Facility: CLINIC | Age: 47
End: 2022-09-01

## 2022-09-01 NOTE — TELEPHONE ENCOUNTER
Screening Questions    BlueKIND OF PREP RedLOCATION [review exclusion criteria] GreenSEDATION TYPE      1. Are you active on mychart? y    2. What insurance is in the chart? [employer]     3.   Ordering/Referring Provider: Bijal Jaimes MD       4. BMI   (If greater than 40 review exclusion criteria [PAC APPT IF [MAC] @ [facility])  28.3  [If yes, BMI OVER 40-EXTENDED PREP]      **(Sedation review/consideration needed)**  Do you have a legal guardian or Medical Power of    and/or are you able to give consent for your medical care?     Can give consent    5. Have you had a positive covid test in the last 90 days?   n -     6.  Are you currently on dialysis?   n [ If yes, G-PREP & HOSPITAL setting ONLY]     7.  Do you have chronic kidney disease?  n [ If yes, G-PREP ]    8.   Do you have a diagnosis of diabetes?   n   [ If yes, G-PREP ]    9.  On a regular basis do you go 3-5 days between bowel movements?   n   [ If yes, EXTENDED PREP]    10.  Are you taking any prescription pain medications on a routine schedule?    n -  [ If yes, EXTENDED PREP] [If yes, MAC]      11.   Do you have any chemical dependencies such as alcohol, street drugs, or methadone?    n [If yes, MAC]    12.   Do you have any history of post-traumatic stress syndrome, severe anxiety or history of psychosis?    n  [If yes, MAC]    13.  [FEMALES] Are you currently pregnant? n    If yes, how many weeks?       Respiratory/Heart Screening:  [If yes to any of the following HOSPITAL setting only]     14. Do you have Pulmonary Hypertension [Lungs]?   n       15. Do you have UNCONTROLLED asthma?   n     16.  Do you use daily home oxygen?  n      17. Do you have mod to severe Obstructive Sleep Apnea?         (OKAY @ [facility]  [facility]  SH  PH  RI  MG - if pt is not on OXYGEN)  n      18.   Have you had a heart or lung transplant?   n      19.   Have you had a stroke or Transient ischemic attack (TIA - aka  mini stroke ) within 6 months?  (If yes, please review  exclusion criteria)  n     20.   In the past 6 months, have you had any heart related issues including cardiomyopathy or heart attack?   n           If yes, did it require cardiac stenting or other implantable device?         21.   Do you have any implantable devices in your body (pacemaker, defib, LVAD)? (If yes, please review exclusion criteria)  n   22.  Do you take the medication Phentermine?     Yes-> Hold for 7 days before procedure.  Please consult your prescribing provider if you have questions about holding this medication.     No-> Continue to next question.    23. Do you take nitroglycerin?   n           If yes, how often?   (if yes, HOSPITAL setting ONLY)    24.  Are you currently taking any blood thinners?    [If yes, INFORM patient to follw up w/ ORDERING PROVIDER FOR BRIDGING INSTRUCTIONS]     n    25.   Do you transfer independently?                (If NO, please HOSPITAL setting ONLY)  y    26.   Preferred LOCAL Pharmacy for Pre Prescription:      [facility] [id number] [address] [facility]    Scheduling Details  (Please ask for phone number if not scheduled by patient)      Caller : Michelle Jalloh    Date of Procedure: 12/21  Surgeon: [place]  [place] [facility] [place] Type: MODERATE l   Conscious Sedation- Needs  for 6 hours after the procedure  MAC/General-Needs  for 24 hours after procedure    n :[Pre-op Required] at [facility]  SH  MG and OR for MAC sedation   (advise patient they will need a pre-op WITH IN 30 DAYS of procedure date)     Type of Procedure Scheduled:   Lower Endoscopy [Colonoscopy]    Which Colonoscopy Prep was Sent?:   Gprep - mag citrate recall      [facility] PATIENTS & [facility] PATIENTS NEEDS EXTENDED PREP       Informed patient they will need an adult  y  Cannot take any type of public or medical transportation alone    Pre-Procedure Covid test to be completed at [facility] or Externally: home test  **INFORMED OF HOME TESTING & LAB  OPTION**        Confirmed Nurse will call to complete assessment y    Additional comments:

## 2022-09-22 ENCOUNTER — ANCILLARY PROCEDURE (OUTPATIENT)
Dept: ULTRASOUND IMAGING | Facility: CLINIC | Age: 47
End: 2022-09-22
Attending: FAMILY MEDICINE
Payer: COMMERCIAL

## 2022-09-22 DIAGNOSIS — C73 PAPILLARY THYROID CARCINOMA (H): ICD-10-CM

## 2022-09-22 PROCEDURE — 76536 US EXAM OF HEAD AND NECK: CPT | Mod: TC | Performed by: RADIOLOGY

## 2022-09-26 ENCOUNTER — PRE VISIT (OUTPATIENT)
Dept: ENDOCRINOLOGY | Facility: CLINIC | Age: 47
End: 2022-09-26

## 2022-10-03 ENCOUNTER — VIRTUAL VISIT (OUTPATIENT)
Dept: ENDOCRINOLOGY | Facility: CLINIC | Age: 47
End: 2022-10-03
Attending: FAMILY MEDICINE
Payer: COMMERCIAL

## 2022-10-03 DIAGNOSIS — E89.0 HISTORY OF PARTIAL THYROIDECTOMY: Primary | ICD-10-CM

## 2022-10-03 DIAGNOSIS — R59.0 CERVICAL ADENOPATHY: ICD-10-CM

## 2022-10-03 DIAGNOSIS — C73 PAPILLARY THYROID CARCINOMA (H): ICD-10-CM

## 2022-10-03 PROCEDURE — 99215 OFFICE O/P EST HI 40 MIN: CPT | Mod: GT

## 2022-10-03 NOTE — LETTER
10/3/2022       RE: Michelle Jalloh  [address] [facility] [address]     Dear Colleague,    Thank you for referring your patient, Michelle Jalloh, to the [facility] ENDOCRINOLOG[facility] [place] at [facility]. Please see a copy of my visit note below.    Endocrine Consult Video visit note-    Michelle Jalloh  is being evaluated via a billable video visit.      How would you like to obtain your AVS? [employer]  For the video visit, send the invitation by: Text to cell phone: [phone]  Will anyone else be joining your video visit? 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  :She is no on LT4.    Labs to include Tg    Partial thyroidectomy right lobectomy and isthmusectomy    Cervical adenopathy called by radiologist 12/2021 US.  I don't consider the level 2 LNs suspicious.  Even the right level 3 isn't highly suspicious but it is on the same side of the neck as the PTC was found.  I can't see it on older studies   Repeat neck US and consider FNAB of right lateral neck abnormal LN if found    Due to the COVID 19 pandemic this visit was a video visit in order to help prevent spread of infection in this patient and the general population. The patient gave verbal consent for the visit today. I have independently reviewed and interpreted labs, imaging as indicated.     Chart review/prep time 1 0719-0757  Visit Start time  1806   Visit Stop time  1817   _51_ minutes spent on the date of the encounter doing chart review, history and exam, documentation and further activities as noted above.    Latisha Rdz MD    Chief complaint:  Michelle is a 47 year old female who is a former patient of Dr Rey Sinclair [facility] presenting here today as a new patient.   I have reviewed Care Everywhere including [facility]  [facility]  reports, imaging reports and provider notes as indicated.      HISTORY OF PRESENT ILLNESS    I saw Michelle briefly  in 2016.  Right isthmus nodule was suspicious for malignancy.  We also biopsied left cervical lymph nodes with benign cytology.  She went on to surgery, didn't have much follow up afterwards and was last  seen by Dr Sinclair in [place] once in 2021.     Thyroid cancer treatment has been as follows:   6/27/17 right thyroid lobectomy and isthmusectomy: papillary microcarcinoma 0.4 cm  She is not on thyroid hormone.      Endocrine relevant labs are as follows:  12/20/16 Tg 13.3, calcitonin 3.3  8/25/17 Tg 12.4 ([facility]), DEZ <0.4 ,TPO < 10, TSH 1.7, free T4 1.01, free T3 2.7, TPO < 10  11/6/19 TSH 1.54  1/14/2021 TSH 1.92, free T4 1.08, free T3 3.3  6/18/2021 TSH 1.63  12/17/2021 TSH 2.01  8/15/2022 TSH 2.21    Relevant imaging is as follows: (as read by me as it pertains to endocrine relevant organs)  8/18/16 thyroid US: right isthmus nodule 0.4 x 0.5 x 0.5 suspicious   12/30/16 neck US   12/14/2021 US neck: compared with 6/8/2021   Right level 3 0.8 x 0.5 x 0.8 cm -- I can't see this on past USs  Right level 4 0.8 x 0.3 x 1.1- was 0.8 x 0.3 x 1.6   right level 5 0.7 x 0.3 x 0.9 cm ; was 1 x 0.4 x 1.3 cm   Left level 3 0.9 x 0.3 x 1.1 cm   Left level 4  0.8 x 0.4 x 0.9 cm   Left level 4 # 2 0.5 x 0.3 x 0.7 cm   Left level 5  1.2  X 0.4 x   Left level 5 0.8 x 0.5 x   12/14/2021 CT neck with contrast  2/17/22 CT neck with contrast:   Right thryroid absent  Left thyroid present  9/22/22 thyroid US  (this has no neck images)- compared with 12/14/2021   Right thyroid absent with very small remnant right thyroid bed with small internal nodule  0.4 x 0.3 X 0.3   Left thyroid present  Left inferior isthmus nodule # 2 0.6 x 0.4 x 0.7 anechoic - was 0.5 x 0.4 x 0.8   Left inferior nodule # 3 0.5  X 0.3 x 0.6 cm anechoic- was 0.4 x 0.3 x 0.4     REVIEW OF SYSTEMS  Doing OK   ? Perimenopause - night sweats, hot flashes;  feels like PMS that never goes away  Skipped LMP  More anxious   Irritable   10 system ROS otherwise as per the HPI or negative    Past Medical History  Past Medical History:   Diagnosis Date     Acne vulgaris 2007     Adjustment disorder with depressed mood      Discoid lupus erythematosus      History of partial thyroidectomy      Melasma 2007     Motion sickness      Papillary microcarcinoma of thyroid (H) 2017     Past Surgical History:   Procedure Laterality Date     APPENDECTOMY  18      SECTION  2014     THYROIDECTOMY Right 2017    Procedure: THYROIDECTOMY;  Right Thyroid Lobectomy, Isthmysectomy;  Surgeon: Marisa Enamorado MD;  Location:  OR     Medications  Current Outpatient Medications   Medication Sig Dispense Refill     MULTIPLE VITAMIN PO Indications: Pt notes maybe [employer] - PRN       Probiotic Product (PROBIOTIC DAILY PO) Take 1 capsule by mouth daily         Allergies  No Known Allergies    Family History  Family History   Problem Relation Age of Onset     Hyperthyroidism Mother         Thyroid     Depression Mother      Hypertension Father      Hypertension Paternal Grandmother      Diabetes Paternal Grandfather      Brain Cancer Maternal Uncle      Colon Cancer No family hx of      Breast Cancer No family hx of      Thyroid Cancer No family hx of      Social History  Social History     Tobacco Use     Smoking status: Former Smoker     Start date: 1999     Smokeless tobacco: Never Used   Substance Use Topics     Alcohol use: Yes     Alcohol/week: 0.0 standard drinks     Comment: 1/week if that.     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   BP Readings from Last 1 Encounters:   08/15/22 126/72      Pulse Readings from Last 1 Encounters:   08/15/22 52      Resp Readings from Last 1 Encounters:   21 18      Temp Readings from Last 1 Encounters:   08/15/22 97.2  F (36.2  C) (Temporal)      SpO2  "Readings from Last 1 Encounters:   08/15/22 98%      Wt Readings from Last 1 Encounters:   08/15/22 76.9 kg (169 lb 9.6 oz)      Ht Readings from Last 1 Encounters:   08/15/22 1.62 m (5' 3.78\")     GENERAL: no distress  SKIN: Visible skin clear. No significant rash, abnormal pigmentation or lesions.  EYES: Eyes grossly normal to inspection.  No discharge or erythema, or obvious scleral/conjunctival abnormalities.  NECK: visible goiter is not present; no visible neck masses  RESP: No audible wheeze, cough, or visible cyanosis.  No visible retractions or increased work of breathing.    NEURO: Awake, alert, responds appropriately to questions.  Mentation and speech fluent.  PSYCH:affect normal and appearance well-groomed.    DATA REVIEW    ENDO THYROID LABS-[facility] Latest Ref Rng & Units 8/15/2022 12/17/2021   TSH 0.40 - 4.00 mU/L 2.21 2.01   FREE T3 2.3 - 4.2 pg/mL     FREE T4 0.76 - 1.46 ng/dL       ENDO THYROID LABS-[facility] Latest Ref Rng & Units 6/18/2021 1/14/2021   TSH 0.40 - 4.00 mU/L 1.63 1.92   FREE T3 2.3 - 4.2 pg/mL  3.3   FREE T4 0.76 - 1.46 ng/dL  1.08     Patient Name: GRETA BARRAZA   MR#: [id number]   Specimen #: [id number]   Collected: 6/27/2017   Received: 6/27/2017   Reported: 6/29/2017 13:22   Ordering Phy(s): DILIP CARLSON   For improved result formatting, select 'View Enhanced Report Format' under Linked Documents section.   ORIGINAL REPORT:   SPECIMEN(S): Right thyroid lobe and isthmus   FINAL DIAGNOSIS:   Right thyroid lobe and isthmus:   - Microscopic papillary thyroid carcinoma        - Tumor focality: One focus in the right lobe        - Largest tumor size: 0.4 cm        - Histologic type: Papillary carcinoma        - Variant: Classical        - Architecture: Papillary        - Cytomorphology: Classical        - Margins: Uninvolved by carcinoma        - The distance of tumor from closest margin: 0.1 cm from anterior margin        - Tumor capsule: Partially present        - Tumor capsule " "invasion: Identified        - Lymph-vascular invasion: Not identified        - Perineural invasion: Not identified        - Extrathyroidal extension: Not identified     Report Name: Thyroid Gland - Resection   Status: Submitted     Part(s) Involved:   A: Right thyroid lobe and isthmus     Synoptic Report:     SPECIMEN     Procedure:         - Lobectomy with isthmusectomy (hemithyroidectomy)       Side:           - Right     TUMOR     Histologic Type:         - Papillary carcinoma       Common Significant Variants:           - Classical (usual, conventional)     Tumor Size: 0.4 cm     Tumor Laterality:     - Right lobe     Tumor Focality:     - Unifocal     Tumor Extent       Extrathyroidal Extension:      - Not identified     Accessory Tumor Findings       Angioinvasion (vascular invasion):       - Not identified       Lymphatic Invasion:     - Not identified     MARGINS     Margins:   - Margins uninvolved by carcinoma     LYMPH NODES     Regional Lymph Nodes:     - No nodes submitted or found     STAGE (PTNM)     Primary Tumor (pT):      - pT1a:  Tumor 1 cm or less in greatest dimension limited to the   thyroid.     Regional Lymph Nodes (pN):      - pNX: Regional lymph nodes cannot be assessed     CAP eCC March 2016 Annual Release     I have personally reviewed all specimens and or slides, including the   listed special stains, and used them with my medical judgement to   determine the final diagnosis.     Electronically signed out by:     Ronna Gonzalez M.D., PhD, [facility]     CLINICAL HISTORY:   42 year old female with thyroid nodule.     GROSS:   The specimen is received fresh with proper patient identification  labeled \"right thyroid lobe and isthmus\", and consists of a 7.8 g  hemithyroidectomy specimen to include right lobe with isthmus (4.2  superior-inferior x 2.2 medial-lateral x 1.5 cm anterior-posterior).  The capsule is dark red-tan, smooth, and intact.  The specimen is inked  as follows: anterior = " blue, isthmus = orange, and posterior = black.  The specimen is serially sectioned from superior to inferior into nine   slices, revealing a 0.4 x 0.4 x 0.3 cm pale gray, ill-defined nodule  within the superior pole of the right lobe, grossly coming to within 0.2 cm of the anterior capsule, 0.2 cm of the posterior capsule, and 1.7 cm  of the isthmus.  The grossly uninvolved thyroid parenchyma is dark red  and glistening.  No other nodules are grossly identified.  The nodule is entirely submitted for frozen section diagnosis, and the remaining  tissue from frozen sectioning and additional representative sections are submitted as follows:   Summary of Sections:   1FS - slice 3, superior pole with entire nodule, frozen section fragment   2 - slice 2, parenchyma immediately superior to nodule   3 - slice 4, parenchyma immediately inferior to nodule (incomplete anterior capsule)   4-5 - slice 5, mid pole bisected, including area of isthmus (incomplete anterior capsule)   6 - slice 7, inferior pole, including area of isthmus   7 - slice 8, inferior pole (Dictated by: Katie Mccarthy 6/28/2017 02:13 PM)   INTRAOPERATIVE CONSULTATION:   Frozen section is performed. Please refer to Epic Result History for the Preliminary Intraoperative Diagnosis.   MICROSCOPIC:  Microscopic examination was performed.  CPT Codes: A: 86206-AL5, 13869-HQ   TESTING LAB LOCATION:   [facility], [address]   [address]   [phone]        THYROID 9/22/2022 9:41 AM  CLINICAL HISTORY: Papillary thyroid carcinoma (H)  TECHNIQUE: Thyroid ultrasound.   COMPARISON: Ultrasound 12/14/2021, CT 2/17/2022   FINDINGS:  RIGHT lobe: Right thyroidectomy. No suspicious nodules in thethyroidectomy bed.   LEFT lobe: 4.6 x 1.6 x 2 cm. 2 subcentimeter colloid cysts. Otherwisenormal.  NECK: No cervical lymphadenopathy.  NODULES: No solid nodules.                                                             IMPRESSION:  1.  Stable benign left colloid cysts.  2.  No evidence of recurrent or metastatic disease.  ART ZHANG MD     ULTRASOUND HEAD AND NECK SOFT TISSUE December 14, 2021 9:33 AM  HISTORY: Follow up PTC. Papillary thyroid carcinoma (H).  COMPARISON: 6/18/2021.  FINDINGS: Right thyroidectomy.   Lymph nodes as follows:  1. Right level 2 lymph node without definite fatty hilum measures 1.0x 1.1 x 0.5 cm, previously 0.7 x 1.0 x 0.6 cm.  2. Rounded right level 3 lymph node measures 0.8 x 0.8 x 0.5 cm.  3. Left neck level 2 lymph node without definite fatty hilum measures1.1 x 0.5 x 1.5 cm.  Multiple other normal-appearing lymph nodes are demonstrated.                                                         IMPRESSION: Three suspicious nodules without fatty carmen described  above. Consider fine-needle aspiration.     FELICIA FALLON MD     Patient Name: GRETA BARRAZA   MR#: [id number]   Specimen #: [id number]   Collected: 1/4/2017   Received: 1/4/2017   Reported: 1/4/2017 16:04   Ordering Phy(s): SUSAN BELL     For improved result formatting, select 'View Enhanced Report Format'   under Linked Documents section.     SPECIMEN/STAIN PROCESS:   A: FNA-lymph node, left neck level 2        Pap-Cyto x 2, Diff Quick Stain-cyto x 3   B: FNA-lymph node, left neck level 4        Pap-Cyto x 2, Diff Quick Stain-cyto x 3     ----------------------------------------------------------------     CYTOLOGIC INTERPRETATION:     A.  Lymph node, left neck, level 2, ultrasound guided fine needle   aspiration:   - Polymorphous lymphoid tissue present   - Negative for Malignancy   Specimen Adequacy: Satisfactory for evaluation.     B.  Lymph node, left neck, level 4, ultrasound guided fine needle   aspiration:   - Polymorphous lymphoid tissue present   - Negative for Malignancy   Specimen Adequacy: Satisfactory for evaluation.     COMMENT:   Concurrent flow cytometry ([id number]) reports polytypic B-cells  and no   aberrant immunophenotype on T-cells.     I have personally reviewed all specimens and/or slides, including the   listed special stains, and used them with my medical judgment to   determine the final diagnosis.     Electronically signed out by:   Yakov Vu M.D., [facility]     Processed and screened at [facility]     CLINICAL HISTORY:   41 year old female with a history of a right isthmus thyroid nodule   ([id number]) suspicious for papillary carcinoma, with prominent left neck   lymph nodes identified.     ,     GROSS:     A. FNA-lymph node, left neck level 2:  Received are 2 fixed slides,   processed for Pap stain, and 3 air dried slides, processed for Diff Quik   stain. Material in RPMI submitted for Flow Cytometry.  Sample in RPMI   contains both site A and B.     B. FNA-lymph node, left neck level 4:  Received are 2 fixed slides,   processed for Pap stain, and 3 air dried slides, processed for Diff Quik   stain. Material in RPMI submitted for Flow Cytometry.     INTRAOPERATIVE CONSULTATION:   FNA Performance: Fine needle aspiration was not performed by [facility],    Pathology staff.     Immediate Adequacy: On site specimen adequacy evaluation was performed   by Dr. CHELY Vu III, MD via telepathology.     Onsite adequacy/interpretation:   Pass A1: adequate; Pass A2: submitted directly into RPMI; Pass A3:   adequate; Pass A4: inadequate   Pass B1: adequate; Pass B2: submitted directly into RPMI; Pass B3:   inadequate; Pass B4: adequate     MICROSCOPIC:   Microscopic examination is performed.     Winston Gipson DO, pathology resident, and Yakov Vu MD.     CPT Codes:   A: 72013-YRBE-DWT, 38000-OMME-TOD, 75203-NIXY   B: 92774-XPUO-VKK, 18011-TTSO-VKM, 73228-NHVP     TESTING LAB LOCATION:   [facility], [address]   [address] [phone]     COLLECTION  SITE:   Client:  [facility], [place]   Location:  [facility] (B)     Latisha Rdz MD

## 2022-10-03 NOTE — PROGRESS NOTES
Endocrine Consult Video visit note-    Michelle Jalloh  is being evaluated via a billable video visit.      How would you like to obtain your AVS? Packetzoom  For the video visit, send the invitation by: Text to cell phone: [phone]  Will anyone else be joining your video visit? 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  :She is no on LT4.    Labs to include Tg    Partial thyroidectomy right lobectomy and isthmusectomy    Cervical adenopathy called by radiologist 12/2021 US.  I don't consider the level 2 LNs suspicious.  Even the right level 3 isn't highly suspicious but it is on the same side of the neck as the PTC was found.  I can't see it on older studies   Repeat neck US and consider FNAB of right lateral neck abnormal LN if found    Addendum  10/17/22 neck US;  Right thyroid absent;   Right thyroid bed mass level 6 # 1 0.4  X0.3 x 0.4 cm   Right level 3 # 1 1.1 x 0.4 x 1.9 cm   Right level 5 # 1 0.8 x 0.5 x 1.4 cm   Left thyroid normal echogenicity  Left thyroid anechoic cyst # 1  0.5 x 0.4 x 0.7 cm   Left thyroid anechoic cyst # 2 0.4 x 0.3 x 0.4 cm   Left neck  level 3 LN # 1 1.1 x 0.3 x 1.4 cm   Left level 4 # 1 0.9 x 0.5 x 1.0 cm   Left level 4 # 2 0.7 x 0.6 x 1.2 - abnormal ? To my eye - radiologist calling it normal     Due to the COVID 19 pandemic this visit was a video visit in order to help prevent spread of infection in this patient and the general population. The patient gave verbal consent for the visit today. I have independently reviewed and interpreted labs, imaging as indicated.     Chart review/prep time 1 0719-0757  Visit Start time  1806   Visit Stop time  1817   _51_ minutes spent on the date of the encounter doing chart review, history and exam, documentation and further activities as noted above.    Latisha Rdz MD    Chief complaint:  Michelle is a 47 year old female who is a former patient of  Dr Rey Sinclair [facility] presenting here today as a new patient.   I have reviewed Care Everywhere including [facility] lab reports, imaging reports and provider notes as indicated.      HISTORY OF PRESENT ILLNESS    I saw Michelle briefly  in 2016.  Right isthmus nodule was suspicious for malignancy.  We also biopsied left cervical lymph nodes with benign cytology.  She went on to surgery, didn't have much follow up afterwards and was last  seen by Dr Sinclair in [place] once in 2021.     Thyroid cancer treatment has been as follows:   6/27/17 right thyroid lobectomy and isthmusectomy: papillary microcarcinoma 0.4 cm  She is not on thyroid hormone.      Endocrine relevant labs are as follows:  12/20/16 Tg 13.3, calcitonin 3.3  8/25/17 Tg 12.4 ([facility]), DEZ <0.4 ,TPO < 10, TSH 1.7, free T4 1.01, free T3 2.7, TPO < 10  11/6/19 TSH 1.54  1/14/2021 TSH 1.92, free T4 1.08, free T3 3.3  6/18/2021 TSH 1.63  12/17/2021 TSH 2.01  8/15/2022 TSH 2.21    Relevant imaging is as follows: (as read by me as it pertains to endocrine relevant organs)  8/18/16 thyroid US: right isthmus nodule 0.4 x 0.5 x 0.5 suspicious   12/30/16 neck US   12/14/2021 US neck: compared with 6/8/2021   Right level 3 0.8 x 0.5 x 0.8 cm -- I can't see this on past USs  Right level 4 0.8 x 0.3 x 1.1- was 0.8 x 0.3 x 1.6   right level 5 0.7 x 0.3 x 0.9 cm ; was 1 x 0.4 x 1.3 cm   Left level 3 0.9 x 0.3 x 1.1 cm   Left level 4  0.8 x 0.4 x 0.9 cm   Left level 4 # 2 0.5 x 0.3 x 0.7 cm   Left level 5  1.2  X 0.4 x   Left level 5 0.8 x 0.5 x   12/14/2021 CT neck with contrast  2/17/22 CT neck with contrast:   Right thryroid absent  Left thyroid present  9/22/22 thyroid US  (this has no neck images)- compared with 12/14/2021   Right thyroid absent with very small remnant right thyroid bed with small internal nodule  0.4 x 0.3 X 0.3   Left thyroid present  Left inferior isthmus nodule # 2 0.6 x 0.4 x 0.7 anechoic - was 0.5  x 0.4 x 0.8   Left inferior nodule # 3 0.5  X 0.3 x 0.6 cm anechoic- was 0.4 x 0.3 x 0.4     REVIEW OF SYSTEMS  Doing OK   ? Perimenopause - night sweats, hot flashes; feels like PMS that never goes away  Skipped LMP  More anxious   Irritable   10 system ROS otherwise as per the HPI or negative    Past Medical History  Past Medical History:   Diagnosis Date     Acne vulgaris      Adjustment disorder with depressed mood      Discoid lupus erythematosus      History of partial thyroidectomy      Melasma      Motion sickness      Papillary microcarcinoma of thyroid (H)      Past Surgical History:   Procedure Laterality Date     APPENDECTOMY  18      SECTION  2014     THYROIDECTOMY Right 2017    Procedure: THYROIDECTOMY;  Right Thyroid Lobectomy, Isthmysectomy;  Surgeon: Marisa Enamorado MD;  Location:  OR     Medications  Current Outpatient Medications   Medication Sig Dispense Refill     MULTIPLE VITAMIN PO Indications: Pt notes maybe [employer] brand - PRN       Probiotic Product (PROBIOTIC DAILY PO) Take 1 capsule by mouth daily         Allergies  No Known Allergies    Family History  Family History   Problem Relation Age of Onset     Hyperthyroidism Mother         Thyroid     Depression Mother      Hypertension Father      Hypertension Paternal Grandmother      Diabetes Paternal Grandfather      Brain Cancer Maternal Uncle      Colon Cancer No family hx of      Breast Cancer No family hx of      Thyroid Cancer No family hx of      Social History  Social History     Tobacco Use     Smoking status: Former Smoker     Start date: 1999     Smokeless tobacco: Never Used   Substance Use Topics     Alcohol use: Yes     Alcohol/week: 0.0 standard drinks     Comment: 1/week if that.     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   BP Readings from Last 1 Encounters:   08/15/22 126/72      Pulse Readings from Last 1  "Encounters:   08/15/22 52      Resp Readings from Last 1 Encounters:   01/14/21 18      Temp Readings from Last 1 Encounters:   08/15/22 97.2  F (36.2  C) (Temporal)      SpO2 Readings from Last 1 Encounters:   08/15/22 98%      Wt Readings from Last 1 Encounters:   08/15/22 76.9 kg (169 lb 9.6 oz)      Ht Readings from Last 1 Encounters:   08/15/22 1.62 m (5' 3.78\")     GENERAL: no distress  SKIN: Visible skin clear. No significant rash, abnormal pigmentation or lesions.  EYES: Eyes grossly normal to inspection.  No discharge or erythema, or obvious scleral/conjunctival abnormalities.  NECK: visible goiter is not present; no visible neck masses  RESP: No audible wheeze, cough, or visible cyanosis.  No visible retractions or increased work of breathing.    NEURO: Awake, alert, responds appropriately to questions.  Mentation and speech fluent.  PSYCH:affect normal and appearance well-groomed.    DATA REVIEW    ENDO THYROID LABS-[facility] Latest Ref Rng & Units 8/15/2022 12/17/2021   TSH 0.40 - 4.00 mU/L 2.21 2.01   FREE T3 2.3 - 4.2 pg/mL     FREE T4 0.76 - 1.46 ng/dL       ENDO THYROID LABS-[facility] Latest Ref Rng & Units 6/18/2021 1/14/2021   TSH 0.40 - 4.00 mU/L 1.63 1.92   FREE T3 2.3 - 4.2 pg/mL  3.3   FREE T4 0.76 - 1.46 ng/dL  1.08     Patient Name: GRETA BARRAZA   MR#: [id number]   Specimen #: [id number]   Collected: 6/27/2017   Received: 6/27/2017   Reported: 6/29/2017 13:22   Ordering Phy(s): DILIP CARLSON   For improved result formatting, select 'View Enhanced Report Format' under Linked Documents section.   ORIGINAL REPORT:   SPECIMEN(S): Right thyroid lobe and isthmus   FINAL DIAGNOSIS:   Right thyroid lobe and isthmus:   - Microscopic papillary thyroid carcinoma        - Tumor focality: One focus in the right lobe        - Largest tumor size: 0.4 cm        - Histologic type: Papillary carcinoma        - Variant: Classical        - Architecture: Papillary        - Cytomorphology: Classical        - Margins: " "Uninvolved by carcinoma        - The distance of tumor from closest margin: 0.1 cm from anterior margin        - Tumor capsule: Partially present        - Tumor capsule invasion: Identified        - Lymph-vascular invasion: Not identified        - Perineural invasion: Not identified        - Extrathyroidal extension: Not identified     Report Name: Thyroid Gland - Resection   Status: Submitted     Part(s) Involved:   A: Right thyroid lobe and isthmus     Synoptic Report:     SPECIMEN     Procedure:         - Lobectomy with isthmusectomy (hemithyroidectomy)       Side:           - Right     TUMOR     Histologic Type:         - Papillary carcinoma       Common Significant Variants:           - Classical (usual, conventional)     Tumor Size: 0.4 cm     Tumor Laterality:     - Right lobe     Tumor Focality:     - Unifocal     Tumor Extent       Extrathyroidal Extension:      - Not identified     Accessory Tumor Findings       Angioinvasion (vascular invasion):       - Not identified       Lymphatic Invasion:     - Not identified     MARGINS     Margins:   - Margins uninvolved by carcinoma     LYMPH NODES     Regional Lymph Nodes:     - No nodes submitted or found     STAGE (PTNM)     Primary Tumor (pT):      - pT1a:  Tumor 1 cm or less in greatest dimension limited to the   thyroid.     Regional Lymph Nodes (pN):      - pNX: Regional lymph nodes cannot be assessed     [facility] March 2016 Annual Release     I have personally reviewed all specimens and or slides, including the   listed special stains, and used them with my medical judgement to   determine the final diagnosis.     Electronically signed out by:     Ronna Gonzalez M.D., PhD, [facility]     CLINICAL HISTORY:   42 year old female with thyroid nodule.     GROSS:   The specimen is received fresh with proper patient identification  labeled \"right thyroid lobe and isthmus\", and consists of a 7.8 g  hemithyroidectomy specimen to include right lobe with isthmus (4.2  " superior-inferior x 2.2 medial-lateral x 1.5 cm anterior-posterior).  The capsule is dark red-tan, smooth, and intact.  The specimen is inked  as follows: anterior = blue, isthmus = orange, and posterior = black.  The specimen is serially sectioned from superior to inferior into nine   slices, revealing a 0.4 x 0.4 x 0.3 cm pale gray, ill-defined nodule  within the superior pole of the right lobe, grossly coming to within 0.2 cm of the anterior capsule, 0.2 cm of the posterior capsule, and 1.7 cm  of the isthmus.  The grossly uninvolved thyroid parenchyma is dark red  and glistening.  No other nodules are grossly identified.  The nodule is entirely submitted for frozen section diagnosis, and the remaining  tissue from frozen sectioning and additional representative sections are submitted as follows:   Summary of Sections:   1FS - slice 3, superior pole with entire nodule, frozen section fragment   2 - slice 2, parenchyma immediately superior to nodule   3 - slice 4, parenchyma immediately inferior to nodule (incomplete anterior capsule)   4-5 - slice 5, mid pole bisected, including area of isthmus (incomplete anterior capsule)   6 - slice 7, inferior pole, including area of isthmus   7 - slice 8, inferior pole (Dictated by: Katie Mccarthy 6/28/2017 02:13 PM)   INTRAOPERATIVE CONSULTATION:   Frozen section is performed. Please refer to Epic Result History for the Preliminary Intraoperative Diagnosis.   MICROSCOPIC:  Microscopic examination was performed.  CPT Codes: A: 16350-SC8, 88573-VR   TESTING LAB LOCATION:   [facility], [address]   [id number] [phone]        THYROID 9/22/2022 9:41 AM  CLINICAL HISTORY: Papillary thyroid carcinoma (H)  TECHNIQUE: Thyroid ultrasound.   COMPARISON: Ultrasound 12/14/2021, CT 2/17/2022   FINDINGS:  RIGHT lobe: Right thyroidectomy. No suspicious nodules in thethyroidectomy bed.   LEFT lobe:  4.6 x 1.6 x 2 cm. 2 subcentimeter colloid cysts. Otherwisenormal.  NECK: No cervical lymphadenopathy.  NODULES: No solid nodules.                                                            IMPRESSION:  1.  Stable benign left colloid cysts.  2.  No evidence of recurrent or metastatic disease.  ART ZHANG MD     ULTRASOUND HEAD AND NECK SOFT TISSUE December 14, 2021 9:33 AM  HISTORY: Follow up PTC. Papillary thyroid carcinoma (H).  COMPARISON: 6/18/2021.  FINDINGS: Right thyroidectomy.   Lymph nodes as follows:  1. Right level 2 lymph node without definite fatty hilum measures 1.0x 1.1 x 0.5 cm, previously 0.7 x 1.0 x 0.6 cm.  2. Rounded right level 3 lymph node measures 0.8 x 0.8 x 0.5 cm.  3. Left neck level 2 lymph node without definite fatty hilum measures1.1 x 0.5 x 1.5 cm.  Multiple other normal-appearing lymph nodes are demonstrated.                                                         IMPRESSION: Three suspicious nodules without fatty carmen described  above. Consider fine-needle aspiration.     FELICIA FALLON MD     Patient Name: GRETA BARRAZA   MR#: [id number]   Specimen #: [id number]   Collected: 1/4/2017   Received: 1/4/2017   Reported: 1/4/2017 16:04   Ordering Phy(s): SUSAN BELL     For improved result formatting, select 'View Enhanced Report Format'   under Linked Documents section.     SPECIMEN/STAIN PROCESS:   A: FNA-lymph node, left neck level 2        Pap-Cyto x 2, Diff Quick Stain-cyto x 3   B: FNA-lymph node, left neck level 4        Pap-Cyto x 2, Diff Quick Stain-cyto x 3     ----------------------------------------------------------------     CYTOLOGIC INTERPRETATION:     A.  Lymph node, left neck, level 2, ultrasound guided fine needle   aspiration:   - Polymorphous lymphoid tissue present   - Negative for Malignancy   Specimen Adequacy: Satisfactory for evaluation.     B.  Lymph node, left neck, level 4, ultrasound guided fine needle   aspiration:   - Polymorphous lymphoid  tissue present   - Negative for Malignancy   Specimen Adequacy: Satisfactory for evaluation.     COMMENT:   Concurrent flow cytometry ([id number]) reports polytypic B-cells and no   aberrant immunophenotype on T-cells.     I have personally reviewed all specimens and/or slides, including the   listed special stains, and used them with my medical judgment to   determine the final diagnosis.     Electronically signed out by:   Yakov Vu M.D., Physicians     Processed and screened at [facility]     CLINICAL HISTORY:   41 year old female with a history of a right isthmus thyroid nodule   ([id number]) suspicious for papillary carcinoma, with prominent left neck   lymph nodes identified.     ,     GROSS:     A. FNA-lymph node, left neck level 2:  Received are 2 fixed slides,   processed for Pap stain, and 3 air dried slides, processed for Diff Quik   stain. Material in RPMI submitted for Flow Cytometry.  Sample in RPMI   contains both site A and B.     B. FNA-lymph node, left neck level 4:  Received are 2 fixed slides,   processed for Pap stain, and 3 air dried slides, processed for Diff Quik   stain. Material in RPMI submitted for Flow Cytometry.     INTRAOPERATIVE CONSULTATION:   FNA Performance: Fine needle aspiration was not performed by [facility],    Pathology staff.     Immediate Adequacy: On site specimen adequacy evaluation was performed   by Dr. CHELY Vu III, MD via telepathology.     Onsite adequacy/interpretation:   Pass A1: adequate; Pass A2: submitted directly into RPMI; Pass A3:   adequate; Pass A4: inadequate   Pass B1: adequate; Pass B2: submitted directly into RPMI; Pass B3:   inadequate; Pass B4: adequate     MICROSCOPIC:   Microscopic examination is performed.     Winston Gipson DO, pathology resident, and Yakov Vu MD.     CPT Codes:   A: 57521-SWDE-HGD, 80472-IFNO-LWL, 69496-NQBJ   B: 60661-NHST-ZCB, 66636-FORV-BRW, 69995-IYRG     TESTING  LAB LOCATION:   [facility], [address]   [address]   [phone]     COLLECTION SITE:   Client:  [facility]   Location:  [facility] (JOHNNY)     EXAMINATION: US HEAD NECK SOFT TISSUE, 10/17/2022 12:58 PM   COMPARISON: 12/14/2021. Neck CT 2/17/2022.  HISTORY: Dx: Papillary thyroid carcinoma (H) [C73 (ICD-10-CM)];Cervical adenopathy [R59.0 (ICD-10-CM)]. Additional history: Historyof papillary thyroid carcinoma status post right lobe thyroidectomyand isthmusectomy.  FINDINGS:  Lymph nodes are measured bilaterally with measurements given intransverse x AP x craniocaudal dimensions as follows:  Right:  Level 2: 2.5 x 0.8 x 1.9 cm and 0.9 x 0.5 x 1.6 cm with preservedfatty carmen.   Level 3: 1.1 x 0.4 x 1.9 cm with preserved fatty hilum  Level 4: No lymph nodes seen  Level 5: 0.8 x 0.5 x 1.4 cm with preserved fatty hilum  Level 6: 4 x 3 x 4 mm presumed lymph node with fatty hilum.  Level 7: No lymph nodes seen     Left:  Level 2: 1.1 x 0.4 x 1.1 cm with preserved fatty hilum. Additionalnode measuring 0.8 x 0.6 x 0.8 cm without definite fatty hilum. Thiswas not definitely visualized on prior ultrasound but was likelypresent on prior CT.  Level 3: 1.1 x 0.3 x 1.4 cm with preserved fatty hilum  Level 4: 0.9 x 0.5 x 1 cm and 0.7 x 0.6 x 1.2 cm with preserved fattyhila.  Level 5: No lymph nodes seen.  Level 6: No lymph nodes seen.  Level 7: No lymph nodes seen.  The lymph nodes appears similar to comparison CT.     The left thyroid lobe appears homogenous and measures 2.1 x 1.5 x 5.8cm. There are a couple cystic nodules identified with layeringechogenic debris/calcification compatible with colloid cyst, respectively measuring 0.5 x 0.4 x 0.7 cm and 0.4 x 0.3 x 0.4 cm.                                                                 IMPRESSION:  1. Single nonenlarged left level 2 lymph node without definite  fattyhilum is indeterminant, though was likely present on most recent CT. Recommend attention on followup.  2. Otherwise bilateral benign appearing lymph nodes as detailed abovewithout significant interval change from recent CT.  3. Left thyroid lobe with 2 benign appearing colloid cysts.     I have personally reviewed the examination and initial interpretationand I agree with the findings.  ANGIE RED MD

## 2022-10-04 ENCOUNTER — TELEPHONE (OUTPATIENT)
Dept: ENDOCRINOLOGY | Facility: CLINIC | Age: 47
End: 2022-10-04

## 2022-10-17 ENCOUNTER — TELEPHONE (OUTPATIENT)
Dept: ENDOCRINOLOGY | Facility: CLINIC | Age: 47
End: 2022-10-17

## 2022-10-17 ENCOUNTER — LAB (OUTPATIENT)
Dept: LAB | Facility: CLINIC | Age: 47
End: 2022-10-17
Payer: COMMERCIAL

## 2022-10-17 ENCOUNTER — ANCILLARY PROCEDURE (OUTPATIENT)
Dept: ULTRASOUND IMAGING | Facility: CLINIC | Age: 47
End: 2022-10-17
Payer: COMMERCIAL

## 2022-10-17 DIAGNOSIS — C73 PAPILLARY THYROID CARCINOMA (H): Primary | ICD-10-CM

## 2022-10-17 DIAGNOSIS — R59.0 CERVICAL ADENOPATHY: ICD-10-CM

## 2022-10-17 DIAGNOSIS — E89.0 HISTORY OF PARTIAL THYROIDECTOMY: ICD-10-CM

## 2022-10-17 DIAGNOSIS — C73 PAPILLARY THYROID CARCINOMA (H): ICD-10-CM

## 2022-10-17 LAB — TSH SERPL DL<=0.005 MIU/L-ACNC: 1.45 UIU/ML (ref 0.3–4.2)

## 2022-10-17 PROCEDURE — 86800 THYROGLOBULIN ANTIBODY: CPT | Mod: 90 | Performed by: PATHOLOGY

## 2022-10-17 PROCEDURE — 76536 US EXAM OF HEAD AND NECK: CPT | Mod: GC | Performed by: RADIOLOGY

## 2022-10-17 PROCEDURE — 84432 ASSAY OF THYROGLOBULIN: CPT | Mod: 90 | Performed by: PATHOLOGY

## 2022-10-17 PROCEDURE — 99000 SPECIMEN HANDLING OFFICE-LAB: CPT | Performed by: PATHOLOGY

## 2022-10-17 PROCEDURE — 36415 COLL VENOUS BLD VENIPUNCTURE: CPT | Performed by: PATHOLOGY

## 2022-10-17 PROCEDURE — 84443 ASSAY THYROID STIM HORMONE: CPT | Mod: 90 | Performed by: PATHOLOGY

## 2022-10-17 NOTE — TELEPHONE ENCOUNTER
Provider notified via Text to place lab orders per  request.   Katie Kemp RN on 10/17/2022 at 12:29 PM

## 2022-10-18 NOTE — TELEPHONE ENCOUNTER
ISABEL, sent MyChart, and sent letter 10/18/2022 for pt to c/back to schedule 1 year f/up with Dr. Rdz.

## 2022-10-24 DIAGNOSIS — E89.0 HISTORY OF PARTIAL THYROIDECTOMY: ICD-10-CM

## 2022-10-24 DIAGNOSIS — R59.0 CERVICAL ADENOPATHY: ICD-10-CM

## 2022-10-24 DIAGNOSIS — C73 PAPILLARY THYROID CARCINOMA (H): Primary | ICD-10-CM

## 2022-10-24 LAB — SCANNED LAB RESULT: NORMAL

## 2022-12-14 ENCOUNTER — TELEPHONE (OUTPATIENT)
Dept: GASTROENTEROLOGY | Facility: CLINIC | Age: 47
End: 2022-12-14

## 2022-12-14 DIAGNOSIS — Z12.11 ENCOUNTER FOR SCREENING COLONOSCOPY: Primary | ICD-10-CM

## 2022-12-14 RX ORDER — BISACODYL 5 MG/1
TABLET, DELAYED RELEASE ORAL
Qty: 4 TABLET | Refills: 0 | Status: SHIPPED | OUTPATIENT
Start: 2022-12-14 | End: 2024-07-26

## 2022-12-14 NOTE — TELEPHONE ENCOUNTER
Pre assessment questions completed for upcoming colonoscopy  procedure scheduled on 12.21.2022    COVID policy reviewed.     Pre-op scheduled  N/A    Reviewed procedural arrival time 1130 and facility location [facility]; [address] [place] [address], [address], [place] [address]    Designated  policy reviewed. Instructed to have someone stay 6 hours post procedure.     Anticoagulation/blood thinners? No    Electronic implanted devices? No    Diabetic? No    Procedure indication: screening colonoscopy    Bowel prep recommendation: Standard Golytely     Reviewed procedure prep instructions.     Prep instructions sent via [email or web address].  Bowel prep script sent to [place] [address] [place] [address]     Patient verbalized understanding and had no questions or concerns at this time.    Aaliyah Vargas RN

## 2022-12-20 ENCOUNTER — ANESTHESIA EVENT (OUTPATIENT)
Dept: SURGERY | Facility: AMBULATORY SURGERY CENTER | Age: 47
End: 2022-12-20
Payer: COMMERCIAL

## 2022-12-20 RX ORDER — FENTANYL CITRATE 50 UG/ML
25 INJECTION, SOLUTION INTRAMUSCULAR; INTRAVENOUS
Status: CANCELLED | OUTPATIENT
Start: 2022-12-20

## 2022-12-20 RX ORDER — HYDROMORPHONE HYDROCHLORIDE 1 MG/ML
0.4 INJECTION, SOLUTION INTRAMUSCULAR; INTRAVENOUS; SUBCUTANEOUS EVERY 5 MIN PRN
Status: CANCELLED | OUTPATIENT
Start: 2022-12-20

## 2022-12-20 RX ORDER — FENTANYL CITRATE 50 UG/ML
25 INJECTION, SOLUTION INTRAMUSCULAR; INTRAVENOUS EVERY 5 MIN PRN
Status: CANCELLED | OUTPATIENT
Start: 2022-12-20

## 2022-12-20 RX ORDER — LABETALOL HYDROCHLORIDE 5 MG/ML
10 INJECTION, SOLUTION INTRAVENOUS
Status: CANCELLED | OUTPATIENT
Start: 2022-12-20

## 2022-12-20 RX ORDER — SODIUM CHLORIDE, SODIUM LACTATE, POTASSIUM CHLORIDE, CALCIUM CHLORIDE 600; 310; 30; 20 MG/100ML; MG/100ML; MG/100ML; MG/100ML
INJECTION, SOLUTION INTRAVENOUS CONTINUOUS
Status: CANCELLED | OUTPATIENT
Start: 2022-12-20

## 2022-12-20 RX ORDER — HYDROMORPHONE HYDROCHLORIDE 1 MG/ML
0.2 INJECTION, SOLUTION INTRAMUSCULAR; INTRAVENOUS; SUBCUTANEOUS EVERY 5 MIN PRN
Status: CANCELLED | OUTPATIENT
Start: 2022-12-20

## 2022-12-20 RX ORDER — ONDANSETRON 4 MG/1
4 TABLET, ORALLY DISINTEGRATING ORAL EVERY 30 MIN PRN
Status: CANCELLED | OUTPATIENT
Start: 2022-12-20

## 2022-12-20 RX ORDER — ALBUTEROL SULFATE 0.83 MG/ML
2.5 SOLUTION RESPIRATORY (INHALATION) EVERY 4 HOURS PRN
Status: CANCELLED | OUTPATIENT
Start: 2022-12-20

## 2022-12-20 RX ORDER — OXYCODONE HYDROCHLORIDE 5 MG/1
5 TABLET ORAL EVERY 4 HOURS PRN
Status: CANCELLED | OUTPATIENT
Start: 2022-12-20

## 2022-12-20 RX ORDER — HALOPERIDOL 5 MG/ML
1 INJECTION INTRAMUSCULAR
Status: CANCELLED | OUTPATIENT
Start: 2022-12-20

## 2022-12-20 RX ORDER — HYDRALAZINE HYDROCHLORIDE 20 MG/ML
2.5-5 INJECTION INTRAMUSCULAR; INTRAVENOUS EVERY 10 MIN PRN
Status: CANCELLED | OUTPATIENT
Start: 2022-12-20

## 2022-12-20 RX ORDER — ONDANSETRON 2 MG/ML
4 INJECTION INTRAMUSCULAR; INTRAVENOUS EVERY 30 MIN PRN
Status: CANCELLED | OUTPATIENT
Start: 2022-12-20

## 2022-12-20 RX ORDER — FENTANYL CITRATE 50 UG/ML
50 INJECTION, SOLUTION INTRAMUSCULAR; INTRAVENOUS EVERY 5 MIN PRN
Status: CANCELLED | OUTPATIENT
Start: 2022-12-20

## 2022-12-21 ENCOUNTER — ANESTHESIA (OUTPATIENT)
Dept: SURGERY | Facility: AMBULATORY SURGERY CENTER | Age: 47
End: 2022-12-21
Payer: COMMERCIAL

## 2022-12-21 ENCOUNTER — HOSPITAL ENCOUNTER (OUTPATIENT)
Facility: AMBULATORY SURGERY CENTER | Age: 47
Discharge: HOME | End: 2022-12-21
Attending: INTERNAL MEDICINE | Admitting: INTERNAL MEDICINE
Payer: COMMERCIAL

## 2022-12-21 VITALS
RESPIRATION RATE: 17 BRPM | DIASTOLIC BLOOD PRESSURE: 62 MMHG | SYSTOLIC BLOOD PRESSURE: 110 MMHG | TEMPERATURE: 97.3 F | HEART RATE: 66 BPM | OXYGEN SATURATION: 98 %

## 2022-12-21 VITALS — HEART RATE: 66 BPM

## 2022-12-21 DIAGNOSIS — Z12.11 COLON CANCER SCREENING: Primary | ICD-10-CM

## 2022-12-21 LAB
COLONOSCOPY: NORMAL
HCG UR QL: NEGATIVE
INTERNAL QC OK POCT: NORMAL
POCT KIT EXPIRATION DATE: NORMAL
POCT KIT LOT NUMBER: NORMAL

## 2022-12-21 PROCEDURE — 81025 URINE PREGNANCY TEST: CPT | Performed by: PATHOLOGY

## 2022-12-21 PROCEDURE — 45378 DIAGNOSTIC COLONOSCOPY: CPT | Mod: 33

## 2022-12-21 RX ORDER — FLUMAZENIL 0.1 MG/ML
0.2 INJECTION, SOLUTION INTRAVENOUS
Status: CANCELLED | OUTPATIENT
Start: 2022-12-21 | End: 2022-12-22

## 2022-12-21 RX ORDER — LIDOCAINE 40 MG/G
CREAM TOPICAL
Status: DISCONTINUED | OUTPATIENT
Start: 2022-12-21 | End: 2023-12-23 | Stop reason: HOSPADM

## 2022-12-21 RX ORDER — ONDANSETRON 4 MG/1
4 TABLET, ORALLY DISINTEGRATING ORAL EVERY 6 HOURS PRN
Status: CANCELLED | OUTPATIENT
Start: 2022-12-21

## 2022-12-21 RX ORDER — NALOXONE HYDROCHLORIDE 0.4 MG/ML
0.4 INJECTION, SOLUTION INTRAMUSCULAR; INTRAVENOUS; SUBCUTANEOUS
Status: CANCELLED | OUTPATIENT
Start: 2022-12-21

## 2022-12-21 RX ORDER — PROPOFOL 10 MG/ML
INJECTION, EMULSION INTRAVENOUS CONTINUOUS PRN
Status: DISCONTINUED | OUTPATIENT
Start: 2022-12-21 | End: 2022-12-21

## 2022-12-21 RX ORDER — NALOXONE HYDROCHLORIDE 0.4 MG/ML
0.2 INJECTION, SOLUTION INTRAMUSCULAR; INTRAVENOUS; SUBCUTANEOUS
Status: CANCELLED | OUTPATIENT
Start: 2022-12-21

## 2022-12-21 RX ORDER — GLYCOPYRROLATE 0.2 MG/ML
INJECTION, SOLUTION INTRAMUSCULAR; INTRAVENOUS PRN
Status: DISCONTINUED | OUTPATIENT
Start: 2022-12-21 | End: 2022-12-21

## 2022-12-21 RX ORDER — ONDANSETRON 2 MG/ML
4 INJECTION INTRAMUSCULAR; INTRAVENOUS EVERY 6 HOURS PRN
Status: CANCELLED | OUTPATIENT
Start: 2022-12-21

## 2022-12-21 RX ORDER — SODIUM CHLORIDE, SODIUM LACTATE, POTASSIUM CHLORIDE, CALCIUM CHLORIDE 600; 310; 30; 20 MG/100ML; MG/100ML; MG/100ML; MG/100ML
INJECTION, SOLUTION INTRAVENOUS CONTINUOUS PRN
Status: DISCONTINUED | OUTPATIENT
Start: 2022-12-21 | End: 2022-12-21

## 2022-12-21 RX ORDER — PROCHLORPERAZINE MALEATE 10 MG
10 TABLET ORAL EVERY 6 HOURS PRN
Status: CANCELLED | OUTPATIENT
Start: 2022-12-21

## 2022-12-21 RX ORDER — ONDANSETRON 2 MG/ML
4 INJECTION INTRAMUSCULAR; INTRAVENOUS
Status: DISCONTINUED | OUTPATIENT
Start: 2022-12-21 | End: 2023-12-23 | Stop reason: HOSPADM

## 2022-12-21 RX ORDER — PROPOFOL 10 MG/ML
INJECTION, EMULSION INTRAVENOUS PRN
Status: DISCONTINUED | OUTPATIENT
Start: 2022-12-21 | End: 2022-12-21

## 2022-12-21 RX ORDER — LIDOCAINE HYDROCHLORIDE 20 MG/ML
INJECTION, SOLUTION INFILTRATION; PERINEURAL PRN
Status: DISCONTINUED | OUTPATIENT
Start: 2022-12-21 | End: 2022-12-21

## 2022-12-21 RX ADMIN — PROPOFOL 50 MG: 10 INJECTION, EMULSION INTRAVENOUS at 12:36

## 2022-12-21 RX ADMIN — PROPOFOL 50 MG: 10 INJECTION, EMULSION INTRAVENOUS at 12:37

## 2022-12-21 RX ADMIN — PROPOFOL 180 MCG/KG/MIN: 10 INJECTION, EMULSION INTRAVENOUS at 12:37

## 2022-12-21 RX ADMIN — LIDOCAINE HYDROCHLORIDE 40 MG: 20 INJECTION, SOLUTION INFILTRATION; PERINEURAL at 12:36

## 2022-12-21 RX ADMIN — SODIUM CHLORIDE, SODIUM LACTATE, POTASSIUM CHLORIDE, CALCIUM CHLORIDE: 600; 310; 30; 20 INJECTION, SOLUTION INTRAVENOUS at 12:26

## 2022-12-21 RX ADMIN — GLYCOPYRROLATE 0.2 MG: 0.2 INJECTION, SOLUTION INTRAMUSCULAR; INTRAVENOUS at 12:46

## 2022-12-21 NOTE — ANESTHESIA POSTPROCEDURE EVALUATION
Patient: Michelle Jalloh    Procedure: Procedure(s):  COLONOSCOPY       Anesthesia Type:  MAC    Note:  Disposition: Outpatient   Postop Pain Control: Uneventful            Sign Out: Well controlled pain   PONV: No   Neuro/Psych: Uneventful            Sign Out: Acceptable/Baseline neuro status   Airway/Respiratory: Uneventful            Sign Out: Acceptable/Baseline resp. status   CV/Hemodynamics: Uneventful            Sign Out: Acceptable CV status; No obvious hypovolemia; No obvious fluid overload   Other NRE: NONE   DID A NON-ROUTINE EVENT OCCUR? No           Last vitals:  Vitals Value Taken Time   /62 12/21/22 1320   Temp 36.3  C (97.3  F) 12/21/22 1320   Pulse 66 12/21/22 1320   Resp 17 12/21/22 1320   SpO2 98 % 12/21/22 1320       Electronically Signed By: Get Adames MD  December 21, 2022  3:08 PM

## 2022-12-21 NOTE — ANESTHESIA CARE TRANSFER NOTE
Patient: Michelle Jalloh    Procedure: Procedure(s):  COLONOSCOPY       Diagnosis: Screen for colon cancer [Z12.11]  Diagnosis Additional Information: No value filed.    Anesthesia Type:   MAC     Note:    Oropharynx: oropharynx clear of all foreign objects and spontaneously breathing  Level of Consciousness: awake  Oxygen Supplementation: room air    Independent Airway: airway patency satisfactory and stable  Dentition: dentition unchanged  Vital Signs Stable: post-procedure vital signs reviewed and stable  Report to RN Given: handoff report given  Patient transferred to: Phase II    Handoff Report: Identifed the Patient, Identified the Reponsible Provider, Reviewed the pertinent medical history, Discussed the surgical course, Reviewed Intra-OP anesthesia mangement and issues during anesthesia, Set expectations for post-procedure period and Allowed opportunity for questions and acknowledgement of understanding      Vitals:  Vitals Value Taken Time   BP 98/61 12/21/22 1302   Temp 36.3  C (97.4  F) 12/21/22 1302   Pulse 64 12/21/22 1302   Resp 18 12/21/22 1302   SpO2 99 % 12/21/22 1302       Electronically Signed By: ALPA PATEL  December 21, 2022  1:03 PM

## 2022-12-21 NOTE — H&P
Michelle ZURITA [facility]  [id number]  female  47 year old      Reason for procedure/surgery: colonoscopy    Patient Active Problem List   Diagnosis     CARDIOVASCULAR SCREENING; LDL GOAL LESS THAN 160     Family history of thyroid disease     Common wart     Discoid lupus     Anxiety state     Papillary thyroid carcinoma (H)     History of partial thyroidectomy     Cervical adenopathy       Past Surgical History:    Past Surgical History:   Procedure Laterality Date     APPENDECTOMY  18      SECTION  2014     THYROIDECTOMY Right 2017    Procedure: THYROIDECTOMY;  Right Thyroid Lobectomy, Isthmysectomy;  Surgeon: Marisa Enamorado MD;  Location:  OR       Past Medical History:   Past Medical History:   Diagnosis Date     Acne vulgaris 2007     Adjustment disorder with depressed mood      Discoid lupus erythematosus 2012     History of partial thyroidectomy      Melasma 2007     Motion sickness      Papillary microcarcinoma of thyroid (H)        Social History:   Social History     Tobacco Use     Smoking status: Former     Types: Cigarettes     Start date: 1999     Smokeless tobacco: Never   Substance Use Topics     Alcohol use: Yes     Alcohol/week: 0.0 standard drinks     Comment: 1/week if that.       Family History:   Family History   Problem Relation Age of Onset     Hyperthyroidism Mother         Thyroid     Depression Mother      Hypertension Father      Hypertension Paternal Grandmother      Diabetes Paternal Grandfather      Brain Cancer Maternal Uncle      Colon Cancer No family hx of      Breast Cancer No family hx of      Thyroid Cancer No family hx of        Allergies: No Known Allergies    Active Medications:   Current Outpatient Medications   Medication Sig Dispense Refill     bisacodyl (DULCOLAX) 5 MG EC tablet Take as directed. One day before exam take 2 tablets at 3 PM. Day of exam take 2 tablets at 6 AM. 4 tablet 0     MULTIPLE VITAMIN PO Indications: Pt notes maybe  [place] brand - PRN       polyethylene glycol (GOLYTELY) 236 g suspension Take as directed. One day before exam fill the jug with water. Cover and shake until well mixed. At 6 PM start drinking an 8oz glass of mixture every 15 minutes until jug is 1/2 empty. Store remainder in the refrigerator. Day of exam at 6 AM Drink the other half of the Golytely jug. Drink one 8-ounce glass every 15 minutes until the jug is empty. You should finish the prep 4 hours before the exam. 4000 mL 0     Probiotic Product (PROBIOTIC DAILY PO) Take 1 capsule by mouth daily         Systemic Review:   CONSTITUTIONAL: NEGATIVE for fever, chills, change in weight  ENT/MOUTH: NEGATIVE for ear, mouth and throat problems  RESP: NEGATIVE for significant cough or SOB  CV: NEGATIVE for chest pain, palpitations or peripheral edema    Physical Examination:   Vital Signs: BP (!) 143/89   Pulse 56   Temp 98.7  F (37.1  C) (Temporal)   Resp 16   SpO2 98%   GENERAL: healthy, alert and no distress  RESP: Normal respiratory excursion   CV: regular rates and rhythm and no peripheral edema  ABDOMEN: soft, nontender and bowel sounds normal  MS: no gross musculoskeletal defects noted, no edema    Plan: Appropriate to proceed as scheduled.      Thomas M. Leventhal, MD  12/21/2022    PCP:  Bijal Jaimes

## 2023-02-03 ENCOUNTER — ANCILLARY ORDERS (OUTPATIENT)
Dept: MAMMOGRAPHY | Facility: CLINIC | Age: 48
End: 2023-02-03

## 2023-02-03 ENCOUNTER — ANCILLARY PROCEDURE (OUTPATIENT)
Dept: MAMMOGRAPHY | Facility: CLINIC | Age: 48
End: 2023-02-03
Attending: FAMILY MEDICINE
Payer: COMMERCIAL

## 2023-02-03 DIAGNOSIS — Z12.31 VISIT FOR SCREENING MAMMOGRAM: ICD-10-CM

## 2023-02-03 PROCEDURE — 77067 SCR MAMMO BI INCL CAD: CPT | Mod: TC | Performed by: RADIOLOGY

## 2023-02-03 PROCEDURE — 77063 BREAST TOMOSYNTHESIS BI: CPT | Mod: TC | Performed by: RADIOLOGY

## 2023-04-12 ENCOUNTER — TELEPHONE (OUTPATIENT)
Dept: BEHAVIORAL HEALTH | Facility: CLINIC | Age: 48
End: 2023-04-12
Payer: COMMERCIAL

## 2023-04-12 NOTE — TELEPHONE ENCOUNTER
First attempt to contact pt. Kartikr left a VM with [facility] contact info and encouraged a phone call back to schedule initial therapy appointment. Writer will postpone for tomorrow.    Callyalexander Garcia  04/12/2023  1049    ----- Message from Iraj Rubin sent at 4/12/2023  9:56 AM CDT -----  Regarding: Transition clinic referral  Transition Clinic Referral   [place]         Please Check Type of Referral Requested:       __X__THERAPY: The Transition clinic is able to schedule patients without current medical insurance; these patient will be referred to our Social Work Care Coordinator for Medical Insurance              Assistance. We are open for referral for psychotherapy. Patient is referred from:  Outpatient Intake      ____MEDICATION:  Referrals for Medication are ONLY accepted from the following areas                                     Suboxone and Opioid Management Referrals are automatically denied. [facility] cannot see patient without active medical insurance.   [facility] cannot accept patient with next level of care scheduled with PCP    Referring Provider Contact Name: Iraj RG; Phone Number: [phone]    Reason for Transition Clinic Referral: Pt is concerned about the wait for [facility]. Please help bridge the wait until they can be seen.     Next Level of Care Patient Will Be Transitioned To:  Jessie Rivas LICSW Department: [facility]   Provider(s) Jessie Rivas LICSW   Location Department: [facility]   Date/Time 9/19/23 9am    What Would Be Helpful from the Transition Clinic: Pt is concerned about the wait for [facility]. Please help bridge the wait until they can be seen.      Needs: NO    Does Patient Have Access to Technology: yes    Patient E-mail Address: [email or web address]    Current Patient Phone Number: [phone];     Clinician Gender Preference (if applicable): Yes, female    Patient location preference: [place]

## 2023-04-12 NOTE — TELEPHONE ENCOUNTER
Writer spoke with pt and scheduled initial [facility] therapy appointment on 04/18/2023 @ 12:30 pm. Writer sent intake documents via [employer]. Writer will reply to referral source.Tracker completed.    Cally Garcia  04/12/2023  1134    ----- Message from Iraj Rubin sent at 4/12/2023  9:56 AM CDT -----  Regarding: Transition clinic referral  Transition Clinic Referral   [place]         Please Check Type of Referral Requested:       __X__THERAPY: The Transition clinic is able to schedule patients without current medical insurance; these patient will be referred to our Social Work Care Coordinator for Medical Insurance              Assistance. We are open for referral for psychotherapy. Patient is referred from:  Outpatient Intake      ____MEDICATION:  Referrals for Medication are ONLY accepted from the following areas                                     Suboxone and Opioid Management Referrals are automatically denied. [facility] cannot see patient without active medical insurance.   [facility] cannot accept patient with next level of care scheduled with PCP    Referring Provider Contact Name: Iraj ARCENIO; Phone Number: [phone]    Reason for Transition Clinic Referral: Pt is concerned about the wait for [facility]. Please help bridge the wait until they can be seen.     Next Level of Care Patient Will Be Transitioned To:  Jessie Rivas LICSW Department: [facility]   Provider(s) Jessie Rivas LICSW   Location Department: [facility]   Date/Time 9/19/23 9am    What Would Be Helpful from the Transition Clinic: Pt is concerned about the wait for [facility]. Please help bridge the wait until they can be seen.      Needs: NO    Does Patient Have Access to Technology: yes    Patient E-mail Address: [email or web address]    Current Patient Phone Number: [phone];     Clinician Gender Preference (if applicable): Yes, female    Patient location preference: [place]

## 2023-04-14 ENCOUNTER — TELEPHONE (OUTPATIENT)
Dept: BEHAVIORAL HEALTH | Facility: CLINIC | Age: 48
End: 2023-04-14
Payer: COMMERCIAL

## 2023-04-14 NOTE — TELEPHONE ENCOUNTER
This writer left a message with pt informing them of their appointment scheduled with [facility] on 4/18/23 would need to be canceled. This writer offered to schedule with another provider. This writer sent a message via [employer].     Roxanne Alford   4/14/23  3:09pm

## 2023-04-16 ENCOUNTER — TELEPHONE (OUTPATIENT)
Dept: BEHAVIORAL HEALTH | Facility: CLINIC | Age: 48
End: 2023-04-16
Payer: COMMERCIAL

## 2023-05-08 ASSESSMENT — ANXIETY QUESTIONNAIRES
2. NOT BEING ABLE TO STOP OR CONTROL WORRYING: NOT AT ALL
8. IF YOU CHECKED OFF ANY PROBLEMS, HOW DIFFICULT HAVE THESE MADE IT FOR YOU TO DO YOUR WORK, TAKE CARE OF THINGS AT HOME, OR GET ALONG WITH OTHER PEOPLE?: NOT DIFFICULT AT ALL
5. BEING SO RESTLESS THAT IT IS HARD TO SIT STILL: NOT AT ALL
6. BECOMING EASILY ANNOYED OR IRRITABLE: SEVERAL DAYS
7. FEELING AFRAID AS IF SOMETHING AWFUL MIGHT HAPPEN: NOT AT ALL
3. WORRYING TOO MUCH ABOUT DIFFERENT THINGS: NOT AT ALL
GAD7 TOTAL SCORE: 2
7. FEELING AFRAID AS IF SOMETHING AWFUL MIGHT HAPPEN: NOT AT ALL
1. FEELING NERVOUS, ANXIOUS, OR ON EDGE: NOT AT ALL
GAD7 TOTAL SCORE: 2
GAD7 TOTAL SCORE: 2
4. TROUBLE RELAXING: SEVERAL DAYS
IF YOU CHECKED OFF ANY PROBLEMS ON THIS QUESTIONNAIRE, HOW DIFFICULT HAVE THESE PROBLEMS MADE IT FOR YOU TO DO YOUR WORK, TAKE CARE OF THINGS AT HOME, OR GET ALONG WITH OTHER PEOPLE: NOT DIFFICULT AT ALL

## 2023-05-08 ASSESSMENT — PATIENT HEALTH QUESTIONNAIRE - PHQ9
10. IF YOU CHECKED OFF ANY PROBLEMS, HOW DIFFICULT HAVE THESE PROBLEMS MADE IT FOR YOU TO DO YOUR WORK, TAKE CARE OF THINGS AT HOME, OR GET ALONG WITH OTHER PEOPLE: SOMEWHAT DIFFICULT
SUM OF ALL RESPONSES TO PHQ QUESTIONS 1-9: 7
SUM OF ALL RESPONSES TO PHQ QUESTIONS 1-9: 7

## 2023-05-09 ENCOUNTER — VIRTUAL VISIT (OUTPATIENT)
Dept: PSYCHOLOGY | Facility: CLINIC | Age: 48
End: 2023-05-09
Payer: COMMERCIAL

## 2023-05-09 ENCOUNTER — FCC EXTENDED DOCUMENTATION (OUTPATIENT)
Dept: PSYCHOLOGY | Facility: CLINIC | Age: 48
End: 2023-05-09
Payer: COMMERCIAL

## 2023-05-09 DIAGNOSIS — F43.11 ACUTE POSTTRAUMATIC STRESS DISORDER: ICD-10-CM

## 2023-05-09 DIAGNOSIS — F41.8 DEPRESSION WITH ANXIETY: Primary | ICD-10-CM

## 2023-05-09 PROCEDURE — 90791 PSYCH DIAGNOSTIC EVALUATION: CPT | Mod: VID | Performed by: SOCIAL WORKER

## 2023-05-09 ASSESSMENT — COLUMBIA-SUICIDE SEVERITY RATING SCALE - C-SSRS
TOTAL  NUMBER OF ABORTED OR SELF INTERRUPTED ATTEMPTS LIFETIME: NO
TOTAL  NUMBER OF INTERRUPTED ATTEMPTS LIFETIME: NO
2. HAVE YOU ACTUALLY HAD ANY THOUGHTS OF KILLING YOURSELF?: NO
ATTEMPT LIFETIME: NO
6. HAVE YOU EVER DONE ANYTHING, STARTED TO DO ANYTHING, OR PREPARED TO DO ANYTHING TO END YOUR LIFE?: NO
1. HAVE YOU WISHED YOU WERE DEAD OR WISHED YOU COULD GO TO SLEEP AND NOT WAKE UP?: NO

## 2023-05-09 NOTE — PROGRESS NOTES
"[facility]      PATIENT'S NAME: Michelle Jalloh  PREFERRED NAME: Michelle  PRONOUNS: she/her/hers/herself  MRN: [id number]  : 1975  ADDRESS: [address] [place] [id number]  [facility]  [id number]  DATE OF SERVICE: 23  START TIME:  9:00 AM  END TIME: 10:03 AM  PREFERRED PHONE: [phone]  May we leave a program related message: Yes  SERVICE MODALITY:  Video Visit:      Provider verified identity through the following two step process.  Patient provided:  Patient  and Patient address    Telemedicine Visit: The patient's condition can be safely assessed and treated via synchronous audio and visual telemedicine encounter.      Reason for Telemedicine Visit: Services only offered telehealth    Originating Site (Patient Location): Patient's home    Distant Site (Provider Location): Provider Remote Setting- Home Office    Consent:  The patient/guardian has verbally consented to: the potential risks and benefits of telemedicine (video visit) versus in person care; bill my insurance or make self-payment for services provided; and responsibility for payment of non-covered services.     Patient would like the video invitation sent by:  My Chart    Mode of Communication:  Video Conference via [facility]    Distant Location (Provider):  Off-site    As the provider I attest to compliance with applicable laws and regulations related to telemedicine.    UNIVERSAL ADULT Mental Health DIAGNOSTIC ASSESSMENT    Identifying Information:  Patient is a 48 year old,  individual.  Patient was referred for an assessment byself.  Patient attended the session alone.    Chief Complaint:   The reason for seeking services at this time is: \"depression & anxiety\".  Client reported symptoms of anxiety and depression \"ebb and flow\".  Client questions if being in perimenopause is impacting her mood.  She reported feeling overwhelmed and exhausted, in part due to work related stressors. 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  She reported she notices anxiety when doing big public presentations and at times when teaching.  She reported she worries about the wellbeing of her sister and father who both reside in [place].  Client reported a history of trauma and identified some guilt. Client reported she began individual therapy during adulthood but anxiety symptoms began during childhood.      Patient has attempted to resolve these concerns in the past through individual therapy.. Client reported last engaging in individual therapy about 12 years ago.    Social/Family History:  Patient reported they grew up in  [facility].  They were raised by biological parents  .  Parents  /  when client was [age] years old, neither parent remarried.  Client reported she was happy about the divorce due to the fighting that she witnessed as a child and that it was difficult being in the middle of parents following the divorce and going back and forth between homes.     Patient reported that their childhood was, \"both parents were loving\".  Client reported feeling she was the \"replacement partner\" for her mother.  She reported that her mother was attentive, and felt seen and held by her.  Client is the second born of three children; having an older brother who  as a small child in a MVA and a younger sister.  Client's mother completed suicide when she was 19 years old.  Patient described their current relationships with family of origin as close with sister, identified the relationship as complicated.  She reported that she is a mother figure to her sister and her sister has been upset with her for moving to the [place] and leaving her.   Client reported feeling anxious around her sister due to the unpredictability of her mood and behavior.  Client described her relationship with her " "father as loving but distant.     The patient describes their cultural background as .  Cultural influences and impact on patient's life structure, values, norms, and healthcare: Greek. Family has experienced a lot of loss and displacement due to war & political climate.  Contextual influences on patient's health include: Contextual Factors: Family Factors client cultural belief that you do not leave your family.  Client reported [place] and the narrative in her family has been \"you left\" and her sister has made comments to client about abandoning her. and Community Factors client reported she can feel like an outlier, desire for community but that does not always align with others .    These factors will be addressed in the Preliminary Treatment plan. Patient identified their preferred language to be English. Patient reported they does not need the assistance of an  or other support involved in therapy.     Patient reported had no significant delays in developmental tasks.   Patient's highest education level was graduate school  .  Patient identified the following learning problems: none reported.   Client reported some difficulties with concentration during elementary school.  Modifications will not be used to assist communication in therapy.  Patient reports they are  able to understand written materials.    Patient's current relationship status is  for 20 years.   Patient identified their sexual orientation as heterosexual.  Patient reported having 1 child(eric); eight year old son. Patient identified partner; pets as part of their support system.  Patient identified the quality of these relationships as stable and meaningful,  .      Patient's current living/housing situation involves staying in own home/apartment.  The immediate members of family and household include Marlon Jimenez, Rick, and son and they report that housing is stable.    Patient is " currently employed fulltime.  Client is employed as an . Patient reports their finances are obtained through employment. Patient does identify finances as a current stressor.  Client reported the stress is in part due to school experiencing financial stressors.    Patient reported that they have not been involved with the legal system. Patient does not report being under probation/ parole/ jurisdiction.     Patient's Strengths and Limitations:  Patient identified the following strengths or resources that will help them succeed in treatment: commitment to health and well being, friends / good social support, family support, insight, intelligence and work ethic. Things that may interfere with the patient's success in treatment include: none identified.     Assessments:  The following assessments were completed by patient for this visit:  PHQ9:       5/8/2023    11:19 PM   PHQ-9 SCORE   PHQ-9 Total Score MyChart 7 (Mild depression)   PHQ-9 Total Score 7     GAD7:       5/8/2023    11:20 PM   RJ-7 SCORE   Total Score 2 (minimal anxiety)   Total Score 2     CAGE-AID:       5/8/2023    11:39 PM   CAGE-AID Total Score   Total Score 0   Total Score MyChart 0 (A total score of 2 or greater is considered clinically significant)     PROMIS 10-Global Health (only subscores and total score):       5/8/2023    11:38 PM   PROMIS-10 Scores Only   Global Mental Health Score 12   Global Physical Health Score 16   PROMIS TOTAL - SUBSCORES 28        Assessments completed prior to visit:  The following assessments were completed by patient for this visit:  [place] Suicide Severity Rating Scale (Lifetime/Recent)      5/9/2023     9:05 AM   [place] Suicide Severity Rating (Lifetime/Recent)   1. Wish to be Dead (Lifetime) N   2. Non-Specific Active Suicidal Thoughts (Lifetime) N   Actual Attempt (Lifetime) N   Has subject engaged in non-suicidal self-injurious behavior? (Lifetime) N   Interrupted Attempts (Lifetime) N    Aborted or Self-Interrupted Attempt (Lifetime) N   Preparatory Acts or Behavior (Lifetime) N   Calculated C-SSRS Risk Score (Lifetime/Recent) No Risk Indicated       Personal and Family Medical History:  Patient does report a family history of mental health concerns.  Client reported that her father experiences anxiety but has not been diagnosed or treated.  Client's mother completed suicide when client was 19; client is unaware of her diagnosis.  Client's mother was committed to psychiatric hospital prior to the suicide.     Patient reports family history includes Brain Cancer in her maternal uncle; Depression in her mother; Diabetes in her paternal grandfather; Hypertension in her father and paternal grandmother; Hyperthyroidism in her mother..     Patient does report Mental Health Diagnosis and/or Treatment.  Patient is unaware of previous mental health diagnoses.  Patient reported symptoms of anxiety began during childhood.   Patient has received mental health services in the past: individual therapy.  Psychiatric Hospitalizations: None.  Patient denies a history of civil commitment.  Patient is not receiving other mental health services.        Patient has had a physical exam to rule out medical causes for current symptoms.  Date of last physical exam was within the past year. Client was encouraged to follow up with PCP if symptoms were to develop. The patient has a [facility] Provider, who is named Bijal Jaimes..  Patient reports no current medical concerns.  Patient denies any issues with pain..   There are not significant appetite / nutritional concerns / weight changes.   Patient reports not taking any current medications    Medication Adherence:  Patient reports not currently prescribed.    Patient Allergies:  No Known Allergies    Medical History:    Past Medical History:   Diagnosis Date     Acne vulgaris 2007     Adjustment disorder with depressed mood      Discoid lupus erythematosus 2012      History of partial thyroidectomy      Melasma 2007     Motion sickness      Papillary microcarcinoma of thyroid (H) 2017         Current Mental Status Exam:   Appearance:  Appropriate    Eye Contact:  Good   Psychomotor:  Normal       Gait / station:  Unable to assess via video  Attitude / Demeanor: Cooperative  Interested Pleasant  Speech      Rate / Production: Normal/ Responsive      Volume:  Normal  volume      Language:  intact  Mood:   Sad Anxious  Affect:   Tearful   Thought Content: Clear   Thought Process: Coherent  Logical       Associations: No loosening of associations  Insight:   Good  and Intellectual Insight  Judgment:  Intact   Orientation:  All  Attention/concentration: Good      Substance Use:  Patient did report a family history of substance use concerns.  Client reported her sister and father have a history of substance use concerns.  Patient has not received chemical dependency treatment in the past.  Patient has not ever been to detox.      Patient is not currently receiving any chemical dependency treatment.           Substance History of use Age of first use Date of last use     Pattern and duration of use (include amounts and frequency)   Alcohol never used       NA   Cannabis   never used     NA     Amphetamines   never used     NA   Cocaine/crack    never used       NA   Hallucinogens never used         NA   Inhalants never used         NA   Heroin never used         NA   Other Opiates never used     NA   Benzodiazepine   never used     NA   Barbiturates never used     NA   Over the counter meds never used     NA   Caffeine never used     NA   Nicotine  never used     NA   Other substances not listed above:  Identify:  never used     NA     Patient reported the following problems as a result of their substance use: no problems, not applicable.    Substance Use: No symptoms    Based on the negative CAGE score and clinical interview there  are not indications of drug or alcohol  abuse.      Significant Losses / Trauma / Abuse / Neglect Issues:   Patient did not serve in the .  There are indications or report of significant loss, trauma, abuse or neglect issues related to:     Witness of parents fighting before age 3  Lost house a couple month's before mother's death  Mother completed suicide when client was 19 years old.  Prior to this client worked to commit her due to concerns with her mental health, client reported guilt  Older brother  1 year old by MVA  Maternal grandparents and paternal grandfather  before client was born    Therapist will continue to assess trauma history in future appointments    Concerns for possible neglect are not present.     Safety Assessment:   Patient denies current homicidal ideation and behaviors.  Patient denies current self-injurious ideation and behaviors.    Patient denied risk behaviors associated with substance use.  Patient denies any high risk behaviors associated with mental health symptoms.  Patient reports the following current concerns for their personal safety: None.  Patient reports there are not firearms in the house.      History of Safety Concerns:  Patient denied a history of homicidal ideation.     Patient denied a history of personal safety concerns.    Patient denied a history of assaultive behaviors.    Patient denied a history of sexual assault behaviors.     Patient denied a history of risk behaviors associated with substance use.  Patient denies any history of high risk behaviors associated with mental health symptoms.  Patient reports the following protective factors: dedication to family or friends; safe and stable environment; effectively controls impulses; regular physical activity; purpose; help seeking behaviors when distressed; living with other people; daily obligations; structured day; commitment to well being; sense of personal control or determination    Risk Plan:  See Recommendations for Safety and Risk  Management Plan    Review of Symptoms per patient report:   Depression: Excessive or inappropriate guilt, Change in energy level, Ruminations, Irritability and Feeling sad, down, or depressed  Lidia:  No Symptoms  Psychosis: No Symptoms  Anxiety: Excessive worry, Nervousness, Physical complaints, such as headaches, stomachaches, muscle tension, Sleep disturbance and Ruminations  Panic:  No symptoms  Post Traumatic Stress Disorder:  Experienced traumatic event see trauma history   Eating Disorder: No Symptoms  ADD / ADHD:  No symptoms  Conduct Disorder: No symptoms  Autism Spectrum Disorder: No symptoms  Obsessive Compulsive Disorder: No Symptoms    Patient reports the following compulsive behaviors and treatment history: None identified    Diagnostic Criteria:   Unspecified Anxiety Disorder , Symptoms characteristic of an anxiety disorder that caused clinically significant distress or impairment in social, occupational, or other important areas of functioning predominate but do not meet the full criteria for any of the disorders of the anxiety disorders diagnostic class. Unspecified Trauma- and Stressor-Related Disorder, Symptoms characteristic of a trauma and stressor related disorder that cause clinically significant distress or impairment in social, occupational, or other important areas of functioning predominate but do not meet the full criteria for any of the disorders in the trauma and stressor related disorders diagnostic class.     Functional Status:  Patient reports the following functional impairments:  relationship(s) and work / vocational responsibilities.       Clinical Summary:  1. Reason for assessment: anxiety and depression  2. Psychosocial, Cultural and Contextual Factors: cultural background as .  Cultural influences and impact on patient's life structure, values, norms, and healthcare: Amharic. Family has experienced a lot of loss and displacement due to war & political climate.   "Contextual Factors: Family Factors client cultural belief that you do not leave your family.  Client reported [place] and the narrative in her family has been \"you left\" and her sister has made comments to client about abandoning her. and Community Factors client reported she can feel like an outlier, desire for community but that does not always align with others .     3. Principal DSM5 Diagnoses  (Sustained by DSM5 Criteria Listed Above):   300.09 (F41.8) Other Specified Anxiety Disorder generalized anxiety not occurring more days than not.  4. Other Diagnoses that is relevant to services:  Other Specified Trauma and Stressor Related Disorder  5. Provisional Diagnosis: Unspecified Depressive Disorder as evidenced by reported symptoms.  Therapist will continue to assess  6. Prognosis: Expect Improvement.  7. Likely consequences of symptoms if not treated:worsening symptoms  8. Client strengths include:  creative, educated, employed, goal-focused, has a previous history of therapy, insightful, intelligent, responsible parent, support of family, friends and providers and work history .     Recommendations:     1. Plan for Safety and Risk Management:   Safety and Risk: Recommended that patient call 911 or go to the local ED should there be a change in any of these risk factors..          Report to child / adult protection services was NA.     2. Patient's identified mental health concerns with a cultural influence will be addressed by therapist using person centered approach.     3. Initial Treatment will focus on:    Depressed Mood - alleviate symptoms, increase coping strategies  Anxiety - alleviate symptoms, increase coping strategies   Client expressed interest in learning more about EMDR therapy.     4. Resources/Service Plan:    services are not indicated.   Modifications to assist communication are not indicated.   Additional disability accommodations are not " indicated.      5. Collaboration:   Collaboration / coordination of treatment will be initiated with the following  support professionals: primary care physician.      6.  Referrals:   The following referral(s) will be initiated: NA     A Release of Information has been obtained for the following: NA     Emergency Contact was obtained.     Marlon Jimenez (Spouse)   [phone] (Mobile)     Clinical Substantiation/medical necessity for the above recommendations: client is presenting with trauma history, anxiety and depression symptoms and she would likely benefit from additional support.    7. ALICIA:    ALICIA:  No use    8. Records:   These were reviewed at time of assessment.   Information in this assessment was obtained from the medical record and provided by patient who is a good historian.    Patient will have open access to their mental health medical record.    9.   Interactive Complexity: No      Provider Name/ Credentials:  KATARZYNA Fairbanks  May 9, 2023

## 2023-05-09 NOTE — Clinical Note
Hi-I met with client yesterday to begin individual therapy.  The completed Diagnostic Assessment is completed.  Please let me know if you have any questions.  Jessie

## 2023-06-29 ENCOUNTER — VIRTUAL VISIT (OUTPATIENT)
Dept: PSYCHOLOGY | Facility: CLINIC | Age: 48
End: 2023-06-29
Payer: COMMERCIAL

## 2023-06-29 DIAGNOSIS — F33.41 RECURRENT MAJOR DEPRESSIVE DISORDER, IN PARTIAL REMISSION (H): Primary | ICD-10-CM

## 2023-06-29 DIAGNOSIS — F41.8 DEPRESSION WITH ANXIETY: ICD-10-CM

## 2023-06-29 PROCEDURE — 90834 PSYTX W PT 45 MINUTES: CPT | Mod: VID | Performed by: SOCIAL WORKER

## 2023-06-29 NOTE — PROGRESS NOTES
[facility]                                     Progress Note    Patient Name: Michelle Jalloh  Date: 6/29/2023       Service Type: Individual      Session Start Time:  2:05 PM Session End Time: 2:57 PM     Session Length: 38-52 minutes    Session #: 2    Attendees: Client attended alone    Service Modality:  Video Visit:      Provider verified identity through the following two step process.  Patient provided:  Patient is known previously to provider    Telemedicine Visit: The patient's condition can be safely assessed and treated via synchronous audio and visual telemedicine encounter.      Reason for Telemedicine Visit: Services only offered telehealth    Originating Site (Patient Location): Patient's home    Distant Site (Provider Location): Provider Remote Setting- Home Office    Consent:  The patient/guardian has verbally consented to: the potential risks and benefits of telemedicine (video visit) versus in person care; bill my insurance or make self-payment for services provided; and responsibility for payment of non-covered services.     Patient would like the video invitation sent by:  My Chart    Mode of Communication:  Video Conference via Amwell    Distant Location (Provider):  Off-site    As the provider I attest to compliance with applicable laws and regulations related to telemedicine.    DATA  Interactive Complexity: No  Crisis: No        Progress Since Last Session (Related to Symptoms / Goals / Homework):   Symptoms: client reported improvements with mood and sleep     Homework: Completed in session      Episode of Care Goals: Minimal progress - ACTION (Actively working towards change); Intervened by reinforcing change plan / affirming steps taken     Current / Ongoing Stressors and Concerns:   Stressors in friendships   Worry about father's health     Treatment Objective(s) Addressed in This Session:   use assertive communication skills   Identify a minimum of 1 strategy to  manage symptoms     Intervention:   co-developed treatment plan    Modeled assertive communication    Assessments completed prior to visit:  The following assessments were completed by patient for this visit:  None      ASSESSMENT: Current Emotional / Mental Status (status of significant symptoms):   Risk status (Self / Other harm or suicidal ideation)   Patient denies current fears or concerns for personal safety.   Patient denies current or recent suicidal ideation or behaviors.   Patient denies current or recent homicidal ideation or behaviors.   Patient denies current or recent self injurious behavior or ideation.   Patient denies other safety concerns.   Patient reports there has been no change in risk factors since their last session.     Patient reports there has been no change in protective factors since their last session.     Recommended that patient call 911 or go to the local ED should there be a change in any of these risk factors.     Appearance:   Appropriate    Eye Contact:   Good    Psychomotor Behavior: Normal    Attitude:   Cooperative  Interested   Orientation:   All   Speech    Rate / Production: Talkative    Volume:  Normal    Mood:    Anxious Stable   Affect:    Appropriate  Mood Congruent    Thought Content:  Clear    Thought Form:  Coherent  Goal Directed  Logical    Insight:    Good  and Intellectual Insight     Medication Review:   No current psychiatric medications prescribed     Medication Compliance:   NA     Changes in Health Issues:   None reported     Chemical Use Review:  Substance Use: No Use     Tobacco Use: None    Diagnosis:  Major Depressive Disorder, Mild, Recurrent, in partial remission  300.09 (F41.8) Other Specified Anxiety Disorder generalized anxiety not occurring more days than not.    Collateral Reports Completed:   Not Applicable    PLAN: (Patient Tasks / Therapist Tasks / Other)  Therapist provided client with following resource, book titled Set Boundaries, Find  Peace: A Guide to Reclaiming Yourself by Judi Sellers.      Jessie Rivas, LICSW 6/29/2023                                                         ______________________________________________________________________    Individual Treatment Plan    Patient's Name: Michelle Jalloh  YOB: 1975    Date of Creation: 6/29/2023  Date Treatment Plan Last Reviewed/Revised: 6/29/2023    DSM5 Diagnoses:Major Depressive Disorder, Mild, Recurrent, in partial remission  300.09 (F41.8) Other Specified Anxiety Disorder generalized anxiety not occurring more days than not.  Psychosocial / Contextual Factors: lives with son and   PROMIS (reviewed every 90 days):     PROMIS 10-Global Health (only subscores and total score):       5/8/2023    11:38 PM   PROMIS-10 Scores Only   Global Mental Health Score 12   Global Physical Health Score 16   PROMIS TOTAL - SUBSCORES 28       Referral / Collaboration:  Referral to another professional/service is not indicated at this time..    Anticipated number of session for this episode of care: will review in 90 days  Anticipation frequency of session: every 2-3 weeks  Anticipated Duration of each session: 38-52 minutes  Treatment plan will be reviewed in 90 days or when goals have been changed.       MeasurableTreatment Goal(s) related to diagnosis / functional impairment(s)  Goal 1: Patient will further develop skills for interpersonal relationships.    Objective #A (Patient Action)    Patient will learn and implement healthy boundaries.   Status: New - Date: 6/29/2023     Intervention(s)  Therapist will teach strategies on establishing healthy boundaries.  Therapist will engage client in identifying areas to establish boundaries.    Objective #B  Patient will learn and implement assertive communication skills.   Status: New - Date: 6/29/2023     Intervention(s)  Therapist will teach and model use of assertive communication skills.    Objective #C  Patient will  use cognitive strategies identified in therapy to challenge anxious thoughts that can be barrier to communication.  Status: New - Date: 6/29/2023     Intervention(s)  Therapist will teach the CBT model, including cognitive distortions and cognitive restructuring techniques.      Patient has reviewed and agreed to the above plan.      Jessie Rivas, [facility]  June 29, 2023

## 2023-07-17 ENCOUNTER — PATIENT OUTREACH (OUTPATIENT)
Dept: CARE COORDINATION | Facility: CLINIC | Age: 48
End: 2023-07-17
Payer: COMMERCIAL

## 2023-07-27 ENCOUNTER — VIRTUAL VISIT (OUTPATIENT)
Dept: PSYCHOLOGY | Facility: CLINIC | Age: 48
End: 2023-07-27
Payer: COMMERCIAL

## 2023-07-27 DIAGNOSIS — F41.8 DEPRESSION WITH ANXIETY: ICD-10-CM

## 2023-07-27 DIAGNOSIS — F33.41 RECURRENT MAJOR DEPRESSIVE DISORDER, IN PARTIAL REMISSION (H): Primary | ICD-10-CM

## 2023-07-27 PROCEDURE — 90834 PSYTX W PT 45 MINUTES: CPT | Mod: VID | Performed by: SOCIAL WORKER

## 2023-07-27 NOTE — PROGRESS NOTES
[facility]                                     Progress Note    Patient Name: Michelle Jalloh  Date: 7/27/2023       Service Type: Individual      Session Start Time:  2:02 PM Session End Time: 2:51 PM     Session Length: 38-52 minutes    Session #: 3    Attendees: Client attended alone    Service Modality:  Video Visit:      Provider verified identity through the following two step process.  Patient provided:  Patient is known previously to provider    Telemedicine Visit: The patient's condition can be safely assessed and treated via synchronous audio and visual telemedicine encounter.      Reason for Telemedicine Visit: Services only offered telehealth    Originating Site (Patient Location): Patient's home    Distant Site (Provider Location): Provider's employment    [facility] [employer]     Consent:  The patient/guardian has verbally consented to: the potential risks and benefits of telemedicine (video visit) versus in person care; bill my insurance or make self-payment for services provided; and responsibility for payment of non-covered services.     Patient would like the video invitation sent by:  My Chart    Mode of Communication:  Video Conference via Amwell    Distant Location (Provider):  Off-site    As the provider I attest to compliance with applicable laws and regulations related to telemedicine.    DATA  Interactive Complexity: No  Crisis: No        Progress Since Last Session (Related to Symptoms / Goals / Homework):   Symptoms:  no change since previous appointment      Homework: Achieved / completed to satisfaction      Episode of Care Goals: Minimal progress - ACTION (Actively working towards change); Intervened by reinforcing change plan / affirming steps taken     Current / Ongoing Stressors and Concerns:   Stressors in friendships   Worry about father's health   Father and sister's mental health     Treatment Objective(s) Addressed in This Session:   use  assertive communication skills    Setting healthy boundaries  Identify 1 fear/thought that contributes to anxiety symptoms     Intervention:   Interpersonal: modeled assertive communication, offered coaching on boundary setting      Assessments completed prior to visit:  The following assessments were completed by patient for this visit:  None       ASSESSMENT: Current Emotional / Mental Status (status of significant symptoms):   Risk status (Self / Other harm or suicidal ideation)   Patient denies current fears or concerns for personal safety.   Patient denies current or recent suicidal ideation or behaviors.   Patient denies current or recent homicidal ideation or behaviors.   Patient denies current or recent self injurious behavior or ideation.   Patient denies other safety concerns.   Patient reports there has been no change in risk factors since their last session.     Patient reports there has been no change in protective factors since their last session.     Recommended that patient call 911 or go to the local ED should there be a change in any of these risk factors.     Appearance:   Appropriate    Eye Contact:   Good    Psychomotor Behavior: Normal    Attitude:   Cooperative  Interested Pleasant   Orientation:   All   Speech    Rate / Production: Talkative    Volume:  Normal    Mood:    Anxious Sad   Affect:    Appropriate  Tearful-minimal   Thought Content:  Clear    Thought Form:  Coherent  Goal Directed  Logical    Insight:    Good      Medication Review:   No current psychiatric medications prescribed     Medication Compliance:   NA     Changes in Health Issues:   None reported     Chemical Use Review:  Substance Use: No Use     Tobacco Use: None    Diagnosis:  Major Depressive Disorder, Mild, Recurrent, in partial remission  300.09 (F41.8) Other Specified Anxiety Disorder generalized anxiety not occurring more days than not.    Collateral Reports Completed:   Not Applicable    PLAN: (Patient Tasks /  Therapist Tasks / Other)  Therapist previously provided client with following resource, book titled Set Boundaries, Find Peace: A Guide to Reclaiming Yourself by Judi Sellers.  Client is considering setting boundaries with her sister.  Client is reading a book on nonviolent communication.    Jessie Rivas, [facility] 7/27/2023                                          ______________________________________________________________________    Individual Treatment Plan    Patient's Name: Michelle Jalloh  YOB: 1975    Date of Creation: 6/29/2023  Date Treatment Plan Last Reviewed/Revised: 6/29/2023    DSM5 Diagnoses:Major Depressive Disorder, Mild, Recurrent, in partial remission  300.09 (F41.8) Other Specified Anxiety Disorder generalized anxiety not occurring more days than not.  Psychosocial / Contextual Factors: lives with son and   PROMIS (reviewed every 90 days):     PROMIS 10-Global Health (only subscores and total score):       5/8/2023    11:38 PM   PROMIS-10 Scores Only   Global Mental Health Score 12   Global Physical Health Score 16   PROMIS TOTAL - SUBSCORES 28       Referral / Collaboration:  Referral to another professional/service is not indicated at this time..    Anticipated number of session for this episode of care:  will review in 90 days  Anticipation frequency of session:  every 2-3 weeks  Anticipated Duration of each session: 38-52 minutes  Treatment plan will be reviewed in 90 days or when goals have been changed.       MeasurableTreatment Goal(s) related to diagnosis / functional impairment(s)  Goal 1: Patient will further develop skills for interpersonal relationships.    Objective #A (Patient Action)    Patient will  learn and implement healthy boundaries .   Status: New - Date: 6/29/2023      Intervention(s)  Therapist will teach  strategies on establishing healthy boundaries.  Therapist will engage client in identifying areas to establish boundaries .    Objective  #B  Patient will  learn and implement assertive communication skills .   Status: New - Date: 6/29/2023      Intervention(s)  Therapist will  teach and model use of assertive communication skills .    Objective #C  Patient will use cognitive strategies identified in therapy to challenge anxious thoughts that can be barrier to communication.  Status: New - Date: 6/29/2023      Intervention(s)  Therapist will  teach the CBT model, including cognitive distortions and cognitive restructuring techniques .      Patient has reviewed and agreed to the above plan.      KATARZYNA Fairbanks  June 29, 2023

## 2023-07-31 ENCOUNTER — PATIENT OUTREACH (OUTPATIENT)
Dept: CARE COORDINATION | Facility: CLINIC | Age: 48
End: 2023-07-31
Payer: COMMERCIAL

## 2023-09-07 ENCOUNTER — VIRTUAL VISIT (OUTPATIENT)
Dept: PSYCHOLOGY | Facility: CLINIC | Age: 48
End: 2023-09-07
Payer: COMMERCIAL

## 2023-09-07 DIAGNOSIS — F33.41 RECURRENT MAJOR DEPRESSIVE DISORDER, IN PARTIAL REMISSION (H): Primary | ICD-10-CM

## 2023-09-07 PROCEDURE — 90834 PSYTX W PT 45 MINUTES: CPT | Mod: VID | Performed by: SOCIAL WORKER

## 2023-09-07 NOTE — PROGRESS NOTES
[facility]                                     Progress Note    Patient Name: Michelle Jalloh  Date: 9/7/2023       Service Type: Individual      Session Start Time: 1:33 PM  Session End Time: 2:15 PM     Session Length: 38-52 minutes    Session #: 4    Attendees: Client attended alone    Service Modality:  Video Visit:      Provider verified identity through the following two step process.  Patient provided:  Patient is known previously to provider    Telemedicine Visit: The patient's condition can be safely assessed and treated via synchronous audio and visual telemedicine encounter.      Reason for Telemedicine Visit: Services only offered telehealth    Originating Site (Patient Location): Patient's home    Distant Site (Provider Location): Provider's remote location    Consent:  The patient/guardian has verbally consented to: the potential risks and benefits of telemedicine (video visit) versus in person care; bill my insurance or make self-payment for services provided; and responsibility for payment of non-covered services.     Patient would like the video invitation sent by:  My Chart    Mode of Communication:  Video Conference via Amwell    Distant Location (Provider):  Off-site    As the provider I attest to compliance with applicable laws and regulations related to telemedicine.    DATA  Interactive Complexity: No  Crisis: No        Progress Since Last Session (Related to Symptoms / Goals / Homework):   Symptoms:  no change since previous appointment      Homework: Achieved / completed to satisfaction      Episode of Care Goals: Minimal progress - ACTION (Actively working towards change); Intervened by reinforcing change plan / affirming steps taken     Current / Ongoing Stressors and Concerns:   Stressors in friendships   Worry about father's health   Father and sister's mental health     Treatment Objective(s) Addressed in This Session:   use cognitive strategies identified in  therapy to challenge anxious thoughts  use assertive communication skills    Setting healthy boundaries  Identify 1 fear/thought that contributes to anxiety symptoms     Intervention:   CBT: provided education on CBT model, discussed cognitive distortion, mind reading , modeled cognitive restructuring   Offered coaching on boundary setting   Modeled assertive communication    Assessments completed prior to visit:  The following assessments were completed by patient for this visit:  None       ASSESSMENT: Current Emotional / Mental Status (status of significant symptoms):   Risk status (Self / Other harm or suicidal ideation)   Patient denies current fears or concerns for personal safety.   Patient denies current or recent suicidal ideation or behaviors.   Patient denies current or recent homicidal ideation or behaviors.   Patient denies current or recent self injurious behavior or ideation.   Patient denies other safety concerns.   Patient reports there has been no change in risk factors since their last session.     Patient reports there has been no change in protective factors since their last session.     Recommended that patient call 911 or go to the local ED should there be a change in any of these risk factors.     Appearance:   Appropriate    Eye Contact:   Good    Psychomotor Behavior: Normal    Attitude:   Cooperative  Interested Pleasant   Orientation:   All   Speech    Rate / Production: Talkative    Volume:  Normal    Mood:    Anxious    Affect:    Appropriate    Thought Content:  Clear    Thought Form:  Coherent  Goal Directed  Logical  Tangential    Insight:    Good      Medication Review:   No current psychiatric medications prescribed     Medication Compliance:   NA     Changes in Health Issues:   None reported     Chemical Use Review:  Substance Use: No Use     Tobacco Use: None    Diagnosis:  Major Depressive Disorder, Mild, Recurrent, in partial remission  300.09 (F41.8) Other Specified Anxiety  Disorder generalized anxiety not occurring more days than not.    Collateral Reports Completed:   Not Applicable    PLAN: (Patient Tasks / Therapist Tasks / Other)  Client is reading, book titled Set Boundaries, Find Peace: A Guide to Reclaiming Yourself by Judi Sellers.  Therapist sent client a worksheet on CBT triangle and cognitive distortions.    Jessie Rivas, [facility] 9/7/2023                                          ______________________________________________________________________    Individual Treatment Plan    Patient's Name: Michelle Jalloh  YOB: 1975    Date of Creation: 6/29/2023  Date Treatment Plan Last Reviewed/Revised: 6/29/2023    DSM5 Diagnoses:Major Depressive Disorder, Mild, Recurrent, in partial remission  300.09 (F41.8) Other Specified Anxiety Disorder generalized anxiety not occurring more days than not.  Psychosocial / Contextual Factors: lives with son and   PROMIS (reviewed every 90 days):     PROMIS 10-Global Health (only subscores and total score):       5/8/2023    11:38 PM 8/31/2023     4:21 PM   PROMIS-10 Scores Only   Global Mental Health Score 12 17   Global Physical Health Score 16 17   PROMIS TOTAL - SUBSCORES 28 34       Referral / Collaboration:  Referral to another professional/service is not indicated at this time..    Anticipated number of session for this episode of care:  will review in 90 days  Anticipation frequency of session:  every 2-3 weeks  Anticipated Duration of each session: 38-52 minutes  Treatment plan will be reviewed in 90 days or when goals have been changed.       MeasurableTreatment Goal(s) related to diagnosis / functional impairment(s)  Goal 1: Patient will further develop skills for interpersonal relationships.    Objective #A (Patient Action)    Patient will  learn and implement healthy boundaries .   Status: New - Date: 6/29/2023      Intervention(s)  Therapist will teach  strategies on establishing healthy boundaries.   Therapist will engage client in identifying areas to establish boundaries .    Objective #B  Patient will  learn and implement assertive communication skills .   Status: New - Date: 6/29/2023      Intervention(s)  Therapist will  teach and model use of assertive communication skills .    Objective #C  Patient will use cognitive strategies identified in therapy to challenge anxious thoughts that can be barrier to communication.  Status: New - Date: 6/29/2023      Intervention(s)  Therapist will  teach the CBT model, including cognitive distortions and cognitive restructuring techniques .      Patient has reviewed and agreed to the above plan.      Jessie Rivas, [facility]  June 29, 2023

## 2023-09-23 ENCOUNTER — HEALTH MAINTENANCE LETTER (OUTPATIENT)
Age: 48
End: 2023-09-23

## 2023-09-28 ENCOUNTER — ANCILLARY PROCEDURE (OUTPATIENT)
Dept: ULTRASOUND IMAGING | Facility: CLINIC | Age: 48
End: 2023-09-28
Payer: COMMERCIAL

## 2023-09-28 DIAGNOSIS — R59.0 CERVICAL ADENOPATHY: ICD-10-CM

## 2023-09-28 DIAGNOSIS — E89.0 HISTORY OF PARTIAL THYROIDECTOMY: ICD-10-CM

## 2023-09-28 DIAGNOSIS — C73 PAPILLARY THYROID CARCINOMA (H): ICD-10-CM

## 2023-09-28 PROCEDURE — 99207 US HEAD NECK SOFT TISSUE: CPT | Mod: GC | Performed by: STUDENT IN AN ORGANIZED HEALTH CARE EDUCATION/TRAINING PROGRAM

## 2023-09-28 PROCEDURE — 76536 US EXAM OF HEAD AND NECK: CPT | Mod: GC | Performed by: STUDENT IN AN ORGANIZED HEALTH CARE EDUCATION/TRAINING PROGRAM

## 2023-10-09 ENCOUNTER — VIRTUAL VISIT (OUTPATIENT)
Dept: ENDOCRINOLOGY | Facility: CLINIC | Age: 48
End: 2023-10-09
Payer: COMMERCIAL

## 2023-10-09 VITALS — WEIGHT: 160 LBS | HEIGHT: 64 IN | BODY MASS INDEX: 27.31 KG/M2

## 2023-10-09 DIAGNOSIS — C73 PAPILLARY THYROID CARCINOMA (H): Primary | ICD-10-CM

## 2023-10-09 DIAGNOSIS — E89.0 HISTORY OF PARTIAL THYROIDECTOMY: ICD-10-CM

## 2023-10-09 DIAGNOSIS — R59.0 CERVICAL ADENOPATHY: ICD-10-CM

## 2023-10-09 PROCEDURE — 99214 OFFICE O/P EST MOD 30 MIN: CPT | Mod: VID

## 2023-10-09 ASSESSMENT — PAIN SCALES - GENERAL: PAINLEVEL: NO PAIN (0)

## 2023-10-09 NOTE — LETTER
10/9/2023       RE: Michelle Jalloh  [address] [facility] [address]     Dear Colleague,    Thank you for referring your patient, Michelle Jalloh, to the [facility] ENDOCRINOLOGY [facility] [place] at [facility]. Please see a copy of my visit note below.    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  :She is not on LT4.    Labs to include Tg    Partial thyroidectomy right lobectomy and isthmusectomy    Left level 2 LN questions raised by radiologist - because opf this we will repeat US again in one year.     Due to the continued risks of COVID 19 transmission and to improve ease of access this visit was a telephone/video visit. The patient gave verbal consent for the visit today.    I have independently reviewed and interpreted labs, imaging as indicated.     Distant Location (provider location):  Off-site  Mode of Communication:  Video Conference via [employer]  Chart review/prep time 1 0909';'[id number]'   Visit Start time  1525   Visit Stop time  1530   _30_ minutes spent on the date of the encounter doing chart review, history and exam, documentation and further activities as noted above.    Latisha Rdz MD    Chief complaint:  HISTORY OF PRESENT ILLNESS    Michelle returns for follow up of papillary thyroid microcarcinoma, history of partial thyroidectomy .   She already had neck US in anticipation of this appt.     Thyroid cancer treatment has been as follows:   6/27/17 right thyroid lobectomy and isthmusectomy: papillary microcarcinoma 0.4 cm  She is not on thyroid hormone.      Endocrine relevant labs are as follows:  12/20/16 Tg 13.3, calcitonin 3.3  8/25/17 Tg 12.4 ([facility]), DEZ <0.4 ,TPO < 10, TSH 1.7, free T4 1.01, free T3 2.7, TPO < 10  11/6/19 TSH 1.54  1/14/2021 TSH 1.92, free T4 1.08, free T3 3.3  6/18/2021 TSH  1.63  2021 TSH 2.01  8/15/2022 TSH 2.21  10/15/22 Tg 11.8, DEZ < 0.4, TSH 1.45    Relevant imaging is as follows: (as read by me as it pertains to endocrine relevant organs)  16 thyroid US: right isthmus nodule 0.4 x 0.5 x 0.5 suspicious   16 neck US   2021 CT neck with contrast  22 CT neck with contrast:   Right thryroid absent  Left thyroid present  23 neck US compared with 10/17/22   Right thyroid absent;   Left nodule 0.6 x 0.4 x 0.7 cyst ;  0.5 x 0.4 x 0.7 cm   Left nodule # 2 0.6 x 0.3 x   grade 4 doppler ; was left # 3 0.5 x 0.3 x   Right thyroid bed mass level 6 # 1  0.4 x 0.3 x 0.4 cm was 0.4  X0.3 x 0.4 cm   Right level 3 # 1 not seen was 1.1 x 0.4 x 1.9 cm   Right level 5 # 1 0.8x 0.3 x 2.1 cm was 0.8 x 0.5 x 1.4 cm   Left neck  level 3 LN # 1 1.1 x 0.3 x 1.4 cm   Left level 4 # 1 0.9 x 0.5 x 0.8 was 0.9 x 0.5 x 1.0 cm   Left level 4 # 2  not seen was 0.7 x 0.6 x 1.2 - abnormal ? To my eye - radiologist calling it normal     REVIEW OF SYSTEMS  Feels OK   Lots of exercise, good sleep  Weight is up a bit -   Cardiac negative   Respiratory negative   Less perimenopausal symptoms  Menses coming every 3 months   Less hot flashes and night sweats     Past Medical History  Past Medical History:   Diagnosis Date    Acne vulgaris 2007    Adjustment disorder with depressed mood     Discoid lupus erythematosus     History of partial thyroidectomy     Melasma 2007    Motion sickness     Papillary microcarcinoma of thyroid (H)      Past Surgical History:   Procedure Laterality Date    APPENDECTOMY  18     SECTION  2014    COLONOSCOPY N/A 2022    Procedure: COLONOSCOPY;  Surgeon: Leventhal, Thomas Michael, MD;  Location: [facility] OR    THYROIDECTOMY Right 2017    Procedure: THYROIDECTOMY;  Right Thyroid Lobectomy, Isthmysectomy;  Surgeon: Marisa Enamorado MD;  [facility]     Medications  Current Outpatient Medications   Medication Sig Dispense  Refill    bisacodyl (DULCOLAX) 5 MG EC tablet Take as directed. One day before exam take 2 tablets at 3 PM. Day of exam take 2 tablets at 6 AM. 4 tablet 0    MULTIPLE VITAMIN PO Indications: Pt notes maybe [place] brand - PRN      polyethylene glycol (GOLYTELY) 236 g suspension Take as directed. One day before exam fill the jug with water. Cover and shake until well mixed. At 6 PM start drinking an 8oz glass of mixture every 15 minutes until jug is 1/2 empty. Store remainder in the refrigerator. Day of exam at 6 AM Drink the other half of the Gol[place]ely jug. Drink one 8-ounce glass every 15 minutes until the jug is empty. You should finish the prep 4 hours before the exam. 4000 mL 0    Probiotic Product (PROBIOTIC DAILY PO) Take 1 capsule by mouth daily         Allergies  No Known Allergies    Family History  Family History   Problem Relation Age of Onset    Hyperthyroidism Mother         Thyroid    Depression Mother     Hypertension Father     Hypertension Paternal Grandmother     Diabetes Paternal Grandfather     Brain Cancer Maternal Uncle     Colon Cancer No family hx of     Breast Cancer No family hx of     Thyroid Cancer No family hx of      Social History  Social History     Tobacco Use    Smoking status: Former     Types: Cigarettes     Start date: 1/29/1999    Smokeless tobacco: Never   Substance Use Topics    Alcohol use: Yes     Alcohol/week: 0.0 standard drinks of alcohol     Comment: 1/week if that.    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   BP Readings from Last 1 Encounters:   12/21/22 110/62      Pulse Readings from Last 1 Encounters:   12/21/22 66      Resp Readings from Last 1 Encounters:   12/21/22 17      Temp Readings from Last 1 Encounters:   12/21/22 97.3  F (36.3  C) (Temporal)      SpO2 Readings from Last 1 Encounters:   12/21/22 98%      Wt Readings from Last 1 Encounters:   08/15/22 76.9 kg (169 lb 9.6 oz)      Ht Readings from  "Last 1 Encounters:   08/15/22 1.62 m (5' 3.78\")     GENERAL: no distress; I can see from mid chest up  SKIN: Visible skin clear. No significant rash, abnormal pigmentation or lesions.  EYES: Eyes grossly normal to inspection.   NECK: visible goiter is not present; no visible neck masses  RESP: No audible wheeze, cough, or increased work of breathing.    NEURO: Awake, alert, responds appropriately to questions.  Mentation and speech fluent.  PSYCH:affect normal and appearance well-groomed.    DATA REVIEW      US THYROID 9/28/2023 10:58 AM  COMPARISON: 9/22/2022  HISTORY: Papillary thyroid carcinoma (H); History of partial tthyroidectomy; Cervical adenopathy  FINDINGS:   Thyroid parenchyma: homogenous  The right thyroid is surgically absent.  The left lobe of the thyroid measures: 5.5 x 1.8 x 1.8 cm  The thyroid isthmus measures: 2 mm  Nodule 1:  Location: Left mid/inferior pole  Size: 0.7 x 0.6 x 0.4 cm  Composition: Cystic or almost completely cystic (0 points)  Echogenicity: Anechoic (0 points)  Shape: Wider than tall (0 points)  Margin: Smooth (0 points)  Echogenic Foci: None or large comet tail artifact (0 points)  Stability: No significant change in size  TIRADS: TR1 (0 points) Benign     Nodule 2:  Location: Left inferior pole  Size: 0.6 x 0.6 x 0.3 cm  Composition: Cystic or almost completely cystic (0 points)  Echogenicity: Anechoic (0 points)  Shape: Wider than tall (0 points)  Margin: Smooth (0 points)  Echogenic Foci: None or large comet tail artifact (0 points)  Stability: No significant change in size  TIRADS: TR1 (0 points) Benign  No focal abnormalities within the right thyroidectomy bed, however incompletely evaluated.                                                        Impression:  1. Post surgical changes of right thyroidectomy with no focal abnormality in the partially visualized thyroidectomy bed.  2. Normal left thyroid with multiple unchanged benign cysts. No suspicious thyroid nodules.   "   ACR TI-RADS recommendations  TR2 (2 points) & TR1 (0 points) -No FNA or follow-up  TR3 (3 points) - FNA if ? 2.5cm, follow-up if 1.5 -2.4 cm in 1, 3 and  5 years  TR4 (4-6 points) - FNA if ? 1.5cm, follow-up if 1 -1.4 cm in 1, 2, 3  and 5 years  TR5 (?7 points) - FNA if ? 1cm, follow-up if 0.5 -0.9 cm every year  for 5 years   I have personally reviewed the examination and initial interpretationand I agree with the findings.   OMER HUERTA DO       Narrative & Impression   EXAMINATION: US HEAD NECK SOFT TISSUE, 9/28/2023 10:59 AM   COMPARISON: 10/17/2022  HISTORY: History of papillary thyroid carcinoma status post right lobe  thyroidectomy and isthmusectomy.  TECHNIQUE: Sonographic imaging performed of the neck to evaluate for  lymph nodes using grey scale and limited Doppler technique.  FINDINGS:  Lymph nodes are measured bilaterally with measurements given in  transverse, AP, and craniocaudal dimensions as follows:  Right:  Level 2: 2.5 x 0.8 x 1.9 cm and 0.5 x 1.2 x 1.4 cm  Level 3: None.  Level 4: None.  Level 5: 0.8 x 0.2 x 2.1 cm  Level 6: 0.4 x 0.3 x 0.4 cm  Level 7:  Left:  Level 1: None.  Level 2: 0.8 x 0.4 x 0.9 cm with suggestion of fatty carmen.   1.1 x 0.6 x 1.0 cm lymph node without definite fatty hilum previously  measured 0.6 x 0.8 x 0.8 cm.  Level 3: None.  Level 4: 0.9 x 0.5 x 0.8 cm  Level 5: None.  Level 6: None.  Level 7: None.                                                         IMPRESSION:  1.  Possible slight increase in size of left level 2 lymph node  without definite identification of fatty hilum, indeterminant.  Recommend attention on follow up.  2.  No new or enlarging lymph nodes. Remaining visualized lymph nodes  are benign in appearance.     I have personally reviewed the examination and initial interpretation  and I agree with the findings.     DO Latisha ANNE MD

## 2023-10-09 NOTE — PROGRESS NOTES
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  :She is not on LT4.    Labs to include Tg    Addendum  11/2/23 Tg 12.5, DEZ < 0.4, tSH 2.13, free T4 1.46    Partial thyroidectomy right lobectomy and isthmusectomy    Left level 2 LN questions raised by radiologist - because opf this we will repeat US again in one year.     Due to the continued risks of COVID 19 transmission and to improve ease of access this visit was a telephone/video visit. The patient gave verbal consent for the visit today.    I have independently reviewed and interpreted labs, imaging as indicated.     Distant Location (provider location):  Off-site  Mode of Communication:  Video Conference via [employer]  Chart review/prep time 1 0909';'[id number]'   Visit Start time  1525   Visit Stop time  1530   _30_ minutes spent on the date of the encounter doing chart review, history and exam, documentation and further activities as noted above.    Latisha Rdz MD    Chief complaint:  HISTORY OF PRESENT ILLNESS    Michelle returns for follow up of papillary thyroid microcarcinoma, history of partial thyroidectomy .   She already had neck US in anticipation of this appt.     Thyroid cancer treatment has been as follows:   6/27/17 right thyroid lobectomy and isthmusectomy: papillary microcarcinoma 0.4 cm  She is not on thyroid hormone.      Endocrine relevant labs are as follows:  12/20/16 Tg 13.3, calcitonin 3.3  8/25/17 Tg 12.4 ([facility]), DEZ <0.4 ,TPO < 10, TSH 1.7, free T4 1.01, free T3 2.7, TPO < 10  11/6/19 TSH 1.54  1/14/2021 TSH 1.92, free T4 1.08, free T3 3.3  6/18/2021 TSH 1.63  12/17/2021 TSH 2.01  8/15/2022 TSH 2.21  10/15/22 Tg 11.8, DEZ < 0.4, TSH 1.45    Relevant imaging is as follows: (as read by me as it pertains to endocrine relevant organs)  8/18/16 thyroid US: right isthmus nodule 0.4 x 0.5 x 0.5 suspicious   12/30/16 neck US    2021 CT neck with contrast  22 CT neck with contrast:   Right thryroid absent  Left thyroid present  23 neck US compared with 10/17/22   Right thyroid absent;   Left nodule 0.6 x 0.4 x 0.7 cyst ;  0.5 x 0.4 x 0.7 cm   Left nodule # 2 0.6 x 0.3 x   grade 4 doppler ; was left # 3 0.5 x 0.3 x   Right thyroid bed mass level 6 # 1  0.4 x 0.3 x 0.4 cm was 0.4  X0.3 x 0.4 cm   Right level 3 # 1 not seen was 1.1 x 0.4 x 1.9 cm   Right level 5 # 1 0.8x 0.3 x 2.1 cm was 0.8 x 0.5 x 1.4 cm   Left neck  level 3 LN # 1 1.1 x 0.3 x 1.4 cm   Left level 4 # 1 0.9 x 0.5 x 0.8 was 0.9 x 0.5 x 1.0 cm   Left level 4 # 2  not seen was 0.7 x 0.6 x 1.2 - abnormal ? To my eye - radiologist calling it normal     REVIEW OF SYSTEMS  Feels OK   Lots of exercise, good sleep  Weight is up a bit -   Cardiac negative   Respiratory negative   Less perimenopausal symptoms  Menses coming every 3 months   Less hot flashes and night sweats     Past Medical History  Past Medical History:   Diagnosis Date    Acne vulgaris     Adjustment disorder with depressed mood     Discoid lupus erythematosus     History of partial thyroidectomy     Melasma     Motion sickness     Papillary microcarcinoma of thyroid (H)      Past Surgical History:   Procedure Laterality Date    APPENDECTOMY  18     SECTION  2014    COLONOSCOPY N/A 2022    Procedure: COLONOSCOPY;  Surgeon: Leventhal, Thomas Michael, MD;  Location: [facility] OR    THYROIDECTOMY Right 2017    Procedure: THYROIDECTOMY;  Right Thyroid Lobectomy, Isthmysectomy;  Surgeon: Marisa Enamorado MD;  [facility]     Medications  Current Outpatient Medications   Medication Sig Dispense Refill    bisacodyl (DULCOLAX) 5 MG EC tablet Take as directed. One day before exam take 2 tablets at 3 PM. Day of exam take 2 tablets at 6 AM. 4 tablet 0    MULTIPLE VITAMIN PO Indications: Pt notes maybe [place] brand - PRN      polyethylene glycol (GOLYTELY) 236 g  "suspension Take as directed. One day before exam fill the jug with water. Cover and shake until well mixed. At 6 PM start drinking an 8oz glass of mixture every 15 minutes until jug is 1/2 empty. Store remainder in the refrigerator. Day of exam at 6 AM Drink the other half of the [place] jug. Drink one 8-ounce glass every 15 minutes until the jug is empty. You should finish the prep 4 hours before the exam. 4000 mL 0    Probiotic Product (PROBIOTIC DAILY PO) Take 1 capsule by mouth daily         Allergies  No Known Allergies    Family History  Family History   Problem Relation Age of Onset    Hyperthyroidism Mother         Thyroid    Depression Mother     Hypertension Father     Hypertension Paternal Grandmother     Diabetes Paternal Grandfather     Brain Cancer Maternal Uncle     Colon Cancer No family hx of     Breast Cancer No family hx of     Thyroid Cancer No family hx of      Social History  Social History     Tobacco Use    Smoking status: Former     Types: Cigarettes     Start date: 1/29/1999    Smokeless tobacco: Never   Substance Use Topics    Alcohol use: Yes     Alcohol/week: 0.0 standard drinks of alcohol     Comment: 1/week if that.    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   BP Readings from Last 1 Encounters:   12/21/22 110/62      Pulse Readings from Last 1 Encounters:   12/21/22 66      Resp Readings from Last 1 Encounters:   12/21/22 17      Temp Readings from Last 1 Encounters:   12/21/22 97.3  F (36.3  C) (Temporal)      SpO2 Readings from Last 1 Encounters:   12/21/22 98%      Wt Readings from Last 1 Encounters:   08/15/22 76.9 kg (169 lb 9.6 oz)      Ht Readings from Last 1 Encounters:   08/15/22 1.62 m (5' 3.78\")     GENERAL: no distress; I can see from mid chest up  SKIN: Visible skin clear. No significant rash, abnormal pigmentation or lesions.  EYES: Eyes grossly normal to inspection.   NECK: visible goiter is not present; no " visible neck masses  RESP: No audible wheeze, cough, or increased work of breathing.    NEURO: Awake, alert, responds appropriately to questions.  Mentation and speech fluent.  PSYCH:affect normal and appearance well-groomed.    DATA REVIEW      US THYROID 9/28/2023 10:58 AM  COMPARISON: 9/22/2022  HISTORY: Papillary thyroid carcinoma (H); History of partial tthyroidectomy; Cervical adenopathy  FINDINGS:   Thyroid parenchyma: homogenous  The right thyroid is surgically absent.  The left lobe of the thyroid measures: 5.5 x 1.8 x 1.8 cm  The thyroid isthmus measures: 2 mm  Nodule 1:  Location: Left mid/inferior pole  Size: 0.7 x 0.6 x 0.4 cm  Composition: Cystic or almost completely cystic (0 points)  Echogenicity: Anechoic (0 points)  Shape: Wider than tall (0 points)  Margin: Smooth (0 points)  Echogenic Foci: None or large comet tail artifact (0 points)  Stability: No significant change in size  TIRADS: TR1 (0 points) Benign     Nodule 2:  Location: Left inferior pole  Size: 0.6 x 0.6 x 0.3 cm  Composition: Cystic or almost completely cystic (0 points)  Echogenicity: Anechoic (0 points)  Shape: Wider than tall (0 points)  Margin: Smooth (0 points)  Echogenic Foci: None or large comet tail artifact (0 points)  Stability: No significant change in size  TIRADS: TR1 (0 points) Benign  No focal abnormalities within the right thyroidectomy bed, however incompletely evaluated.                                                        Impression:  1. Post surgical changes of right thyroidectomy with no focal abnormality in the partially visualized thyroidectomy bed.  2. Normal left thyroid with multiple unchanged benign cysts. No suspicious thyroid nodules.     ACR TI-RADS recommendations  TR2 (2 points) & TR1 (0 points) -No FNA or follow-up  TR3 (3 points) - FNA if ? 2.5cm, follow-up if 1.5 -2.4 cm in 1, 3 and  5 years  TR4 (4-6 points) - FNA if ? 1.5cm, follow-up if 1 -1.4 cm in 1, 2, 3  and 5 years  TR5 (?7 points) - FNA  if ? 1cm, follow-up if 0.5 -0.9 cm every year  for 5 years   I have personally reviewed the examination and initial interpretationand I agree with the findings.   OMER HUERTA DO       Narrative & Impression   EXAMINATION: US HEAD NECK SOFT TISSUE, 9/28/2023 10:59 AM   COMPARISON: 10/17/2022  HISTORY: History of papillary thyroid carcinoma status post right lobe  thyroidectomy and isthmusectomy.  TECHNIQUE: Sonographic imaging performed of the neck to evaluate for  lymph nodes using grey scale and limited Doppler technique.  FINDINGS:  Lymph nodes are measured bilaterally with measurements given in  transverse, AP, and craniocaudal dimensions as follows:  Right:  Level 2: 2.5 x 0.8 x 1.9 cm and 0.5 x 1.2 x 1.4 cm  Level 3: None.  Level 4: None.  Level 5: 0.8 x 0.2 x 2.1 cm  Level 6: 0.4 x 0.3 x 0.4 cm  Level 7:  Left:  Level 1: None.  Level 2: 0.8 x 0.4 x 0.9 cm with suggestion of fatty carmen.   1.1 x 0.6 x 1.0 cm lymph node without definite fatty hilum previously  measured 0.6 x 0.8 x 0.8 cm.  Level 3: None.  Level 4: 0.9 x 0.5 x 0.8 cm  Level 5: None.  Level 6: None.  Level 7: None.                                                         IMPRESSION:  1.  Possible slight increase in size of left level 2 lymph node  without definite identification of fatty hilum, indeterminant.  Recommend attention on follow up.  2.  No new or enlarging lymph nodes. Remaining visualized lymph nodes  are benign in appearance.     I have personally reviewed the examination and initial interpretation  and I agree with the findings.     OMER HUERTA DO

## 2023-10-09 NOTE — PATIENT INSTRUCTIONS
See me approximately yearly    Yearly labs now and in one year about 2 weeks prior to next appt  Neck ultrasound prior to next visit in about one year  ([id number] to schedule)

## 2023-10-09 NOTE — NURSING NOTE
Is the patient currently in the state of [place]? YES    Visit mode:VIDEO    If the visit is dropped, the patient can be reconnected by: VIDEO VISIT: Text to cell phone:   Telephone Information:   Mobile [phone]       Will anyone else be joining the visit? NO  (If patient encounters technical issues they should call [phone])    How would you like to obtain your AVS? MyChart    Are changes needed to the allergy or medication list? No    Reason for visit: RECHECK    Vega JONES

## 2023-10-30 ENCOUNTER — TELEPHONE (OUTPATIENT)
Dept: ENDOCRINOLOGY | Facility: CLINIC | Age: 48
End: 2023-10-30
Payer: COMMERCIAL

## 2023-10-30 NOTE — TELEPHONE ENCOUNTER
Spoke with patient and scheduled the 10/9 checkout orders per Dr. Rdz. Yearly lab appointment scheduled for 11/2/23. 1 yr follow-up with Dr. Rdz scheduled for 10/8/24. Patient requested to call and schedule imaging later on her own- scheduling number provided.

## 2023-11-02 ENCOUNTER — LAB (OUTPATIENT)
Dept: LAB | Facility: CLINIC | Age: 48
End: 2023-11-02
Payer: COMMERCIAL

## 2023-11-02 DIAGNOSIS — E89.0 HISTORY OF PARTIAL THYROIDECTOMY: ICD-10-CM

## 2023-11-02 DIAGNOSIS — R59.0 CERVICAL ADENOPATHY: ICD-10-CM

## 2023-11-02 DIAGNOSIS — C73 PAPILLARY THYROID CARCINOMA (H): ICD-10-CM

## 2023-11-02 LAB
T4 FREE SERPL-MCNC: 1.46 NG/DL (ref 0.9–1.7)
TSH SERPL DL<=0.005 MIU/L-ACNC: 2.13 UIU/ML (ref 0.3–4.2)

## 2023-11-02 PROCEDURE — 84432 ASSAY OF THYROGLOBULIN: CPT | Mod: 90

## 2023-11-02 PROCEDURE — 84443 ASSAY THYROID STIM HORMONE: CPT

## 2023-11-02 PROCEDURE — 99000 SPECIMEN HANDLING OFFICE-LAB: CPT

## 2023-11-02 PROCEDURE — 86800 THYROGLOBULIN ANTIBODY: CPT | Mod: 90

## 2023-11-02 PROCEDURE — 84439 ASSAY OF FREE THYROXINE: CPT

## 2023-11-02 PROCEDURE — 36415 COLL VENOUS BLD VENIPUNCTURE: CPT

## 2023-11-03 ENCOUNTER — VIRTUAL VISIT (OUTPATIENT)
Dept: PSYCHOLOGY | Facility: CLINIC | Age: 48
End: 2023-11-03
Payer: COMMERCIAL

## 2023-11-03 DIAGNOSIS — F43.11 ACUTE POSTTRAUMATIC STRESS DISORDER: ICD-10-CM

## 2023-11-03 DIAGNOSIS — F33.41 RECURRENT MAJOR DEPRESSIVE DISORDER, IN PARTIAL REMISSION (H): Primary | ICD-10-CM

## 2023-11-03 PROCEDURE — 90834 PSYTX W PT 45 MINUTES: CPT | Mod: VID | Performed by: SOCIAL WORKER

## 2023-11-03 NOTE — PROGRESS NOTES
[facility]                                     Progress Note    Patient Name: Michelle Jalloh  Date: 11/3/2023       Service Type: Individual      Session Start Time: 9:34 AM Session End Time: 10:24 AM     Session Length: 38-52 minutes    Session #: 5    Attendees: Client attended alone    Service Modality:  Video Visit:      Provider verified identity through the following two step process.  Patient provided:  Patient is known previously to provider    Telemedicine Visit: The patient's condition can be safely assessed and treated via synchronous audio and visual telemedicine encounter.      Reason for Telemedicine Visit: Services only offered telehealth    Originating Site (Patient Location): Patient's home    Distant Site (Provider Location): Provider's remote location    Consent:  The patient/guardian has verbally consented to: the potential risks and benefits of telemedicine (video visit) versus in person care; bill my insurance or make self-payment for services provided; and responsibility for payment of non-covered services.     Patient would like the video invitation sent by:  My Chart    Mode of Communication:  [facility]    Distant Location (Provider):  Off-site    As the provider I attest to compliance with applicable laws and regulations related to telemedicine.    DATA  Interactive Complexity: No  Crisis: No        Progress Since Last Session (Related to Symptoms / Goals / Homework):   Symptoms:  no change since previous appointment      Homework: Achieved / completed to satisfaction   Setting boundaries      Episode of Care Goals: Minimal progress - ACTION (Actively working towards change); Intervened by reinforcing change plan / affirming steps taken     Current / Ongoing Stressors and Concerns:   Stressors in friendships   Worry about father's health   Father and sister's mental health   Recent communication with sister     Treatment Objective(s) Addressed in  This Session:   use assertive communication skills    Setting healthy boundaries  Identify 1 fear/thought that contributes to anxiety symptoms     Intervention:   Offered coaching on assertive communication and boundary setting   Engaged client in identifying areas within her control    Assessments completed prior to visit:  The following assessments were completed by patient for this visit:  None       ASSESSMENT: Current Emotional / Mental Status (status of significant symptoms):   Risk status (Self / Other harm or suicidal ideation)   Patient denies current fears or concerns for personal safety.   Patient denies current or recent suicidal ideation or behaviors.   Patient denies current or recent homicidal ideation or behaviors.   Patient denies current or recent self injurious behavior or ideation.   Patient denies other safety concerns.   Patient reports there has been no change in risk factors since their last session.     Patient reports there has been no change in protective factors since their last session.     Recommended that patient call 911 or go to the local ED should there be a change in any of these risk factors.     Appearance:   Appropriate    Eye Contact:   Good    Psychomotor Behavior: Normal    Attitude:   Cooperative  Interested Pleasant   Orientation:   All   Speech    Rate / Production: Talkative    Volume:  Normal    Mood:    Anxious  Sad    Affect:    Appropriate Tearful   Thought Content:  Clear    Thought Form:  Coherent  Goal Directed  Logical  Tangential    Insight:    Good      Medication Review:   No current psychiatric medications prescribed     Medication Compliance:   NA     Changes in Health Issues:   None reported     Chemical Use Review:  Substance Use: No Use     Tobacco Use: None    Diagnosis:  Major Depressive Disorder, Mild, Recurrent, in partial remission  300.09 (F41.8) Other Specified Anxiety Disorder generalized anxiety not occurring more days than not.    Collateral  Reports Completed:   Not Applicable    PLAN: (Patient Tasks / Therapist Tasks / Other)  Client is reading, book titled Set Boundaries, Find Peace: A Guide to Reclaiming Yourself by Judi Sellers.  Client is using workbook; stop walking on eggshells  Therapist encouraged client to increase awareness of areas within her control.  Client will continue to use assertive communication and boundary setting    Jessie Evelyn, [facility] 11/3/2023    ______________________________________________________________________    Individual Treatment Plan    Patient's Name: Michelle Jalloh  YOB: 1975    Date of Creation: 6/29/2023  Date Treatment Plan Last Reviewed/Revised: 11/3/2023    DSM5 Diagnoses:Major Depressive Disorder, Mild, Recurrent, in partial remission  300.09 (F41.8) Other Specified Anxiety Disorder generalized anxiety not occurring more days than not.  Psychosocial / Contextual Factors: lives with son and , father and sister live in [place]  PROMIS (reviewed every 90 days):     PROMIS 10-Global Health (only subscores and total score):       5/8/2023    11:38 PM 8/31/2023     4:21 PM   PROMIS-10 Scores Only   Global Mental Health Score 12 17   Global Physical Health Score 16 17   PROMIS TOTAL - SUBSCORES 28 34       Referral / Collaboration:  Referral to another professional/service is not indicated at this time..    Anticipated number of session for this episode of care:  will review in 90 days  Anticipation frequency of session:  every 2-3 weeks  Anticipated Duration of each session: 38-52 minutes  Treatment plan will be reviewed in 90 days or when goals have been changed.       MeasurableTreatment Goal(s) related to diagnosis / functional impairment(s)  Goal 1: Patient will further develop skills for interpersonal relationships.    Objective #A (Patient Action)    Patient will  learn and implement healthy boundaries .   Status: New - Date: 6/29/2023  , continued date:  11/3/2023    Intervention(s)  Therapist will teach  strategies on establishing healthy boundaries.  Therapist will engage client in identifying areas to establish boundaries .    Objective #B  Patient will  learn and implement assertive communication skills .   Status: New - Date: 6/29/2023  , continued date: 11/3/2023    Intervention(s)  Therapist will  teach and model use of assertive communication skills .    Objective #C  Patient will use cognitive strategies identified in therapy to challenge anxious thoughts that can be barrier to communication.  Status: New - Date: 6/29/2023  , continued date: 11/3/2023    Intervention(s)  Therapist will  teach the CBT model, including cognitive distortions and cognitive restructuring techniques .      Patient has reviewed and agreed to the above plan.      Jessie Rivas, [facility]  June 29, 2023  Jessie Rivas, [facility]  11/3/2023

## 2023-11-08 LAB — SCANNED LAB RESULT: NORMAL

## 2023-11-14 ENCOUNTER — TELEPHONE (OUTPATIENT)
Dept: PSYCHOLOGY | Facility: CLINIC | Age: 48
End: 2023-11-14
Payer: COMMERCIAL

## 2023-11-14 NOTE — TELEPHONE ENCOUNTER
Therapist left voice message and sent mychart message to client to reschedule November 30th appointment

## 2023-11-21 ENCOUNTER — NURSE TRIAGE (OUTPATIENT)
Dept: FAMILY MEDICINE | Facility: CLINIC | Age: 48
End: 2023-11-21
Payer: COMMERCIAL

## 2023-11-21 NOTE — TELEPHONE ENCOUNTER
"Nurse Triage SBAR    Is this a 2nd Level Triage? NO    Situation: Patient reports dry non-productive \"hacking\" cough and chest tightness 2/10 pain.     Background:   Patient reports she had a cold 3 and a half weeks ago.  Cough and chest tightness for a week.    Hacking non productive cough- keeps patient up at night    Chest tightness across chest  No SOB   No fever  No history of cardiac concerns- High cholesterol 10 years ago- corrected with diet  History of smoking- quit 25 years ago     Patient also notes swelling on back of neck \"where my back meets my shoulders\"- improving  Coughing spell pain - 7-8/10    \"I never go to the doctor but its interrupting my ability to work or sleep\"    Assessment: Patient should be seen in ED due to R/O pneumonia and other cause of related chest pain. RN recommended patient be have someone else drive her. Patient verbalized understanding and all questions answered.     Protocol Recommended Disposition:   Go to ED Now    Recommendation:   No provider review needed. Advised patient to present to ED.    No routing - advised patient to present to ED    Does the patient meet one of the following criteria for ADS visit consideration? 16+ years old, with an [facility] PCP     MARCELLE Keating, RN  [facility] ~ Registered Nurse  Clinic Triage ~ [place] [facility] [place]  November 21, 2023    Reason for Disposition   Chest pain  (Exception: MILD central chest pain, present only when coughing.)    Additional Information   Negative: SEVERE difficulty breathing (e.g., struggling for each breath, speaks in single words)   Negative: Bluish (or gray) lips or face now   Negative: [1] Rapid onset of cough AND [2] has hives   Negative: Coughing started suddenly after medicine, an allergic food or bee sting   Negative: [1] Difficulty breathing AND [2] exposure to flames, smoke, or fumes   Negative: [1] Stridor AND [2] difficulty breathing   Negative: Sounds like a life-threatening emergency to the " "triager   Negative: Choked on object of food that could be caught in the throat   Negative: Chest pain is main symptom   Negative: [1] Previous asthma attacks AND [2] this feels like asthma attack   Negative: Cough lasts > 3 weeks   Negative: Wet cough (productive; white-yellow, yellow, green, or leisa colored sputum)   Negative: [1] Dry cough (non-productive;  no sputum or minimal clear sputum) AND [2] within 14 days of COVID-19 Exposure   Negative: [1] MODERATE difficulty breathing (e.g., speaks in phrases, SOB even at rest, pulse 100-120) AND [2] still present when not coughing    Answer Assessment - Initial Assessment Questions  1. ONSET: \"When did the cough begin?\"       A week ago    2. SEVERITY: \"How bad is the cough today?\"       Hacking cough     3. SPUTUM: \"Describe the color of your sputum\" (none, dry cough; clear, white, yellow, green)      Not productive    4. HEMOPTYSIS: \"Are you coughing up any blood?\" If so ask: \"How much?\" (flecks, streaks, tablespoons, etc.)      No    5. DIFFICULTY BREATHING: \"Are you having difficulty breathing?\" If Yes, ask: \"How bad is it?\" (e.g., mild, moderate, severe)     - MILD: No SOB at rest, mild SOB with walking, speaks normally in sentences, can lie down, no retractions, pulse < 100.     - MODERATE: SOB at rest, SOB with minimal exertion and prefers to sit, cannot lie down flat, speaks in phrases, mild retractions, audible wheezing, pulse 100-120.     - SEVERE: Very SOB at rest, speaks in single words, struggling to breathe, sitting hunched forward, retractions, pulse > 120       No     6. FEVER: \"Do you have a fever?\" If Yes, ask: \"What is your temperature, how was it measured, and when did it start?\"      No fever    7. CARDIAC HISTORY: \"Do you have any history of heart disease?\" (e.g., heart attack, congestive heart failure)       No  High cholesterol 10 years ago- corrected with diet  History of smoking- quit 25 years ago     8. LUNG HISTORY: \"Do you have any " "history of lung disease?\"  (e.g., pulmonary embolus, asthma, emphysema)      No  9. PE RISK FACTORS: \"Do you have a history of blood clots?\" (or: recent major surgery, recent prolonged travel, bedridden)      No    10. OTHER SYMPTOMS: \"Do you have any other symptoms?\" (e.g., runny nose, wheezing, chest pain)        Chest tightness    11. PREGNANCY: \"Is there any chance you are pregnant?\" \"When was your last menstrual period?\"        No     12. TRAVEL: \"Have you traveled out of the country in the last month?\" (e.g., travel history, exposures)        No    Protocols used: Cough - Acute Non-Productive-A-AH    "

## 2023-12-27 ENCOUNTER — VIRTUAL VISIT (OUTPATIENT)
Dept: PSYCHOLOGY | Facility: CLINIC | Age: 48
End: 2023-12-27
Payer: COMMERCIAL

## 2023-12-27 DIAGNOSIS — F33.41 RECURRENT MAJOR DEPRESSIVE DISORDER, IN PARTIAL REMISSION (H): Primary | ICD-10-CM

## 2023-12-27 DIAGNOSIS — F43.11 ACUTE POSTTRAUMATIC STRESS DISORDER: ICD-10-CM

## 2023-12-27 PROCEDURE — 90837 PSYTX W PT 60 MINUTES: CPT | Mod: VID | Performed by: SOCIAL WORKER

## 2023-12-27 NOTE — PROGRESS NOTES
[facility]                                     Progress Note    Patient Name: Michelle Jalloh  Date: 12/27/2023       Service Type: Individual      Session Start Time: 12:02 PM Session End Time: 12:58 PM     Session Length: 53+ minutes    Extended Session (53+ minutes): PROLONGED SERVICE IN THE OUTPATIENT SETTING REQUIRING DIRECT (FACE-TO-FACE) PATIENT CONTACT BEYOND THE USUAL SERVICE:    - Longer session due to limited access to mental health appointments and necessity to address patient's distress / complexity    Session #: 6    Attendees: Client attended alone    Service Modality:  Video Visit:      Provider verified identity through the following two step process.  Patient provided:  Patient is known previously to provider    Telemedicine Visit: The patient's condition can be safely assessed and treated via synchronous audio and visual telemedicine encounter.      Reason for Telemedicine Visit: Services only offered telehealth    Originating Site (Patient Location): Patient's home    Distant Site (Provider Location): [facility], [place], [place]    Consent:  The patient/guardian has verbally consented to: the potential risks and benefits of telemedicine (video visit) versus in person care; bill my insurance or make self-payment for services provided; and responsibility for payment of non-covered services.     Patient would like the video invitation sent by:  My Chart    Mode of Communication:  Video Conference via [facility]    Distant Location (Provider):  On-site    As the provider I attest to compliance with applicable laws and regulations related to telemedicine.    DATA  Interactive Complexity: No  Crisis: No        Progress Since Last Session (Related to Symptoms / Goals / Homework):   Symptoms:  anxiety symptoms      Homework: Achieved / completed to satisfaction   Setting boundaries, using assertive communication     Episode of Care Goals: Minimal progress - ACTION  (Actively working towards change); Intervened by reinforcing change plan / affirming steps taken     Current / Ongoing Stressors and Concerns:   sister's mental health   Dynamics in relationship with sister   Father's health     Treatment Objective(s) Addressed in This Session:   use assertive communication skills    Setting healthy boundaries  Identify 2 fear/thought that contributes to anxiety symptoms     Intervention:   CBT: reinforced behavior changes   Modeled assertive communication   Engaged in active listening    Assessments completed prior to visit:  The following assessments were completed by patient for this visit:  None       ASSESSMENT: Current Emotional / Mental Status (status of significant symptoms):   Risk status (Self / Other harm or suicidal ideation)   Patient denies current fears or concerns for personal safety.   Patient denies current or recent suicidal ideation or behaviors.   Patient denies current or recent homicidal ideation or behaviors.   Patient denies current or recent self injurious behavior or ideation.   Patient denies other safety concerns.   Patient reports there has been no change in risk factors since their last session.     Patient reports there has been no change in protective factors since their last session.     Recommended that patient call 911 or go to the local ED should there be a change in any of these risk factors.     Appearance:   Appropriate    Eye Contact:   Good    Psychomotor Behavior: Normal    Attitude:   Cooperative  Interested Pleasant   Orientation:   All   Speech    Rate / Production: Talkative    Volume:  Normal    Mood:    Anxious  Sad    Affect:    Appropriate Tearful   Thought Content:  Clear    Thought Form:  Coherent  Goal Directed  Tangential    Insight:    Good      Medication Review:   No current psychiatric medications prescribed     Medication Compliance:   NA     Changes in Health Issues:   None reported     Chemical Use Review:  Substance Use:  No Use     Tobacco Use: None    Diagnosis:  Major Depressive Disorder, Mild, Recurrent, in partial remission  300.09 (F41.8) Other Specified Anxiety Disorder generalized anxiety not occurring more days than not.  Generalized Anxiety Disorder, provisional    Collateral Reports Completed:   Not Applicable    PLAN: (Patient Tasks / Therapist Tasks / Other)  Client will continue using assertive communication strategies and set boundaries.    Jessie Rivas, [facility] 12/27/2023    ______________________________________________________________________    Individual Treatment Plan    Patient's Name: Michelle Jalloh  YOB: 1975    Date of Creation: 6/29/2023  Date Treatment Plan Last Reviewed/Revised: 11/3/2023    DSM5 Diagnoses:Major Depressive Disorder, Mild, Recurrent, in partial remission  300.09 (F41.8) Other Specified Anxiety Disorder generalized anxiety not occurring more days than not.  Psychosocial / Contextual Factors: lives with son and , father and sister live in [place]  PROMIS (reviewed every 90 days):     PROMIS 10-Global Health (only subscores and total score):       5/8/2023    11:38 PM 8/31/2023     4:21 PM 12/27/2023    10:25 AM   PROMIS-10 Scores Only   Global Mental Health Score 12 17 15   Global Physical Health Score 16 17 18   PROMIS TOTAL - SUBSCORES 28 34 33       Referral / Collaboration:  Referral to another professional/service is not indicated at this time..    Anticipated number of session for this episode of care:  will review in 90 days  Anticipation frequency of session:  every 2-3 weeks  Anticipated Duration of each session: 38-52 minutes  Treatment plan will be reviewed in 90 days or when goals have been changed.       MeasurableTreatment Goal(s) related to diagnosis / functional impairment(s)  Goal 1: Patient will further develop skills for interpersonal relationships.    Objective #A (Patient Action)    Patient will  learn and implement healthy boundaries .   Status:  New - Date: 6/29/2023  , continued date: 11/3/2023    Intervention(s)  Therapist will teach  strategies on establishing healthy boundaries.  Therapist will engage client in identifying areas to establish boundaries .    Objective #B  Patient will  learn and implement assertive communication skills .   Status: New - Date: 6/29/2023  , continued date: 11/3/2023    Intervention(s)  Therapist will  teach and model use of assertive communication skills .    Objective #C  Patient will use cognitive strategies identified in therapy to challenge anxious thoughts that can be barrier to communication.  Status: New - Date: 6/29/2023  , continued date: 11/3/2023    Intervention(s)  Therapist will  teach the CBT model, including cognitive distortions and cognitive restructuring techniques .      Patient has reviewed and agreed to the above plan.      Jessie Rivas, [facility]  June 29, 2023  Jessie Rivas, [facility]  11/3/2023

## 2024-01-04 ENCOUNTER — PATIENT OUTREACH (OUTPATIENT)
Dept: CARE COORDINATION | Facility: CLINIC | Age: 49
End: 2024-01-04

## 2024-01-31 ENCOUNTER — VIRTUAL VISIT (OUTPATIENT)
Dept: PSYCHOLOGY | Facility: CLINIC | Age: 49
End: 2024-01-31
Payer: COMMERCIAL

## 2024-01-31 DIAGNOSIS — F41.8 OTHER SPECIFIED ANXIETY DISORDERS: ICD-10-CM

## 2024-01-31 DIAGNOSIS — F33.41 RECURRENT MAJOR DEPRESSIVE DISORDER, IN PARTIAL REMISSION (H): Primary | ICD-10-CM

## 2024-01-31 PROCEDURE — 90837 PSYTX W PT 60 MINUTES: CPT | Mod: 95 | Performed by: SOCIAL WORKER

## 2024-01-31 NOTE — PROGRESS NOTES
[facility]                                     Progress Note    Patient Name: Michelle Jalloh  Date: 1/31/2024       Service Type: Individual      Session Start Time: 9:02 AM Session End Time: 9:56 AM     Session Length: 53+ minutes    Extended Session (53+ minutes): PROLONGED SERVICE IN THE OUTPATIENT SETTING REQUIRING DIRECT (FACE-TO-FACE) PATIENT CONTACT BEYOND THE USUAL SERVICE:    - Longer session due to limited access to mental health appointments and necessity to address patient's distress / complexity    Session #: 7    Attendees: Client attended alone    Service Modality:  Video Visit:      Provider verified identity through the following two step process.  Patient provided:  Patient is known previously to provider    Telemedicine Visit: The patient's condition can be safely assessed and treated via synchronous audio and visual telemedicine encounter.      Reason for Telemedicine Visit: Services only offered telehealth    Originating Site (Patient Location): Patient's home    Distant Site (Provider Location): [facility], [place], [place]    Consent:  The patient/guardian has verbally consented to: the potential risks and benefits of telemedicine (video visit) versus in person care; bill my insurance or make self-payment for services provided; and responsibility for payment of non-covered services.     Patient would like the video invitation sent by:  My Chart    Mode of Communication:  Video Conference via [facility]    Distant Location (Provider):  On-site    As the provider I attest to compliance with applicable laws and regulations related to telemedicine.    DATA  Interactive Complexity: No  Crisis: No        Progress Since Last Session (Related to Symptoms / Goals / Homework):   Symptoms:  no change since previous appointment      Homework: Achieved / completed to satisfaction   Setting boundaries, using assertive communication     Episode of Care Goals: Satisfactory  progress - ACTION (Actively working towards change); Intervened by reinforcing change plan / affirming steps taken     Current / Ongoing Stressors and Concerns:   sister's mental health   Dynamics in relationship with sister   Father's health     Treatment Objective(s) Addressed in This Session:   use assertive communication skills    Setting healthy boundaries and reinforcing boundaries  Identify 1 fear/thought that contributes to anxiety symptoms  Use cognitive strategies to manage thoughts/fears that contribute to anxiety symptoms     Intervention:   CBT: reinforced behavior changes, modeled cognitive restruturing   Provided psychoeducation and Modeled assertive communication   Engaged in active listening    Assessments completed prior to visit:  The following assessments were completed by patient for this visit:  None       ASSESSMENT: Current Emotional / Mental Status (status of significant symptoms):   Risk status (Self / Other harm or suicidal ideation)   Patient denies current fears or concerns for personal safety.   Patient denies current or recent suicidal ideation or behaviors.   Patient denies current or recent homicidal ideation or behaviors.   Patient denies current or recent self injurious behavior or ideation.   Patient denies other safety concerns.   Patient reports there has been no change in risk factors since their last session.     Patient reports there has been no change in protective factors since their last session.     Recommended that patient call 911 or go to the local ED should there be a change in any of these risk factors.     Appearance:   Appropriate    Eye Contact:   Good    Psychomotor Behavior: Normal    Attitude:   Cooperative  Interested Pleasant   Orientation:   All   Speech    Rate / Production: Talkative    Volume:  Normal    Mood:    Anxious  Sad    Affect:    Appropriate Tearful-minimal   Thought Content:  Clear  Rumination    Thought Form:  Coherent  Goal Directed   Tangential    Insight:    Good      Medication Review:   No current psychiatric medications prescribed     Medication Compliance:   NA     Changes in Health Issues:   None reported     Chemical Use Review:  Substance Use: No Use     Tobacco Use: None    Diagnosis:  Major Depressive Disorder, Mild, Recurrent, in partial remission  300.09 (F41.8) Other Specified Anxiety Disorder generalized anxiety not occurring more days than not.  Generalized Anxiety Disorder, provisional    Collateral Reports Completed:   Not Applicable    PLAN: (Patient Tasks / Therapist Tasks / Other)  Client will continue using assertive communication strategies and set boundaries.  Client is considering reading additional books on boundary setting.    Jessie Rivas, [facility] 1/31/2024    ______________________________________________________________________    Individual Treatment Plan    Patient's Name: Michelle Jalloh  YOB: 1975    Date of Creation: 6/29/2023  Date Treatment Plan Last Reviewed/Revised: 11/3/2023    DSM5 Diagnoses:Major Depressive Disorder, Mild, Recurrent, in partial remission  300.09 (F41.8) Other Specified Anxiety Disorder generalized anxiety not occurring more days than not.  Psychosocial / Contextual Factors: lives with son and , father and sister live in [place]  PROMIS (reviewed every 90 days):     PROMIS 10-Global Health (only subscores and total score):       5/8/2023    11:38 PM 8/31/2023     4:21 PM 12/27/2023    10:25 AM   PROMIS-10 Scores Only   Global Mental Health Score 12 17 15   Global Physical Health Score 16 17 18   PROMIS TOTAL - SUBSCORES 28 34 33       Referral / Collaboration:  Referral to another professional/service is not indicated at this time..    Anticipated number of session for this episode of care:  will review in 90 days  Anticipation frequency of session:  every 2-3 weeks  Anticipated Duration of each session: 38-52 minutes  Treatment plan will be reviewed in 90 days or  when goals have been changed.       MeasurableTreatment Goal(s) related to diagnosis / functional impairment(s)  Goal 1: Patient will further develop skills for interpersonal relationships.    Objective #A (Patient Action)    Patient will  learn and implement healthy boundaries .   Status: New - Date: 6/29/2023  , continued date: 11/3/2023    Intervention(s)  Therapist will teach  strategies on establishing healthy boundaries.  Therapist will engage client in identifying areas to establish boundaries .    Objective #B  Patient will  learn and implement assertive communication skills .   Status: New - Date: 6/29/2023  , continued date: 11/3/2023    Intervention(s)  Therapist will  teach and model use of assertive communication skills .    Objective #C  Patient will use cognitive strategies identified in therapy to challenge anxious thoughts that can be barrier to communication.  Status: New - Date: 6/29/2023  , continued date: 11/3/2023    Intervention(s)  Therapist will  teach the CBT model, including cognitive distortions and cognitive restructuring techniques .      Patient has reviewed and agreed to the above plan.      Jessie Rivas, [facility]  June 29, 2023  Jessie Rivas, [facility]  11/3/2023

## 2024-02-01 ENCOUNTER — PATIENT OUTREACH (OUTPATIENT)
Dept: CARE COORDINATION | Facility: CLINIC | Age: 49
End: 2024-02-01
Payer: COMMERCIAL

## 2024-02-21 ASSESSMENT — PATIENT HEALTH QUESTIONNAIRE - PHQ9
SUM OF ALL RESPONSES TO PHQ QUESTIONS 1-9: 0
SUM OF ALL RESPONSES TO PHQ QUESTIONS 1-9: 0

## 2024-02-21 ASSESSMENT — ANXIETY QUESTIONNAIRES
GAD7 TOTAL SCORE: 3
IF YOU CHECKED OFF ANY PROBLEMS ON THIS QUESTIONNAIRE, HOW DIFFICULT HAVE THESE PROBLEMS MADE IT FOR YOU TO DO YOUR WORK, TAKE CARE OF THINGS AT HOME, OR GET ALONG WITH OTHER PEOPLE: NOT DIFFICULT AT ALL
7. FEELING AFRAID AS IF SOMETHING AWFUL MIGHT HAPPEN: NOT AT ALL
8. IF YOU CHECKED OFF ANY PROBLEMS, HOW DIFFICULT HAVE THESE MADE IT FOR YOU TO DO YOUR WORK, TAKE CARE OF THINGS AT HOME, OR GET ALONG WITH OTHER PEOPLE?: NOT DIFFICULT AT ALL
GAD7 TOTAL SCORE: 3
3. WORRYING TOO MUCH ABOUT DIFFERENT THINGS: NOT AT ALL
7. FEELING AFRAID AS IF SOMETHING AWFUL MIGHT HAPPEN: NOT AT ALL
2. NOT BEING ABLE TO STOP OR CONTROL WORRYING: NOT AT ALL
6. BECOMING EASILY ANNOYED OR IRRITABLE: SEVERAL DAYS
1. FEELING NERVOUS, ANXIOUS, OR ON EDGE: SEVERAL DAYS
5. BEING SO RESTLESS THAT IT IS HARD TO SIT STILL: NOT AT ALL
4. TROUBLE RELAXING: SEVERAL DAYS
GAD7 TOTAL SCORE: 3

## 2024-02-28 ENCOUNTER — VIRTUAL VISIT (OUTPATIENT)
Dept: PSYCHOLOGY | Facility: CLINIC | Age: 49
End: 2024-02-28
Payer: COMMERCIAL

## 2024-02-28 DIAGNOSIS — F43.12 CHRONIC POST-TRAUMATIC STRESS DISORDER: Primary | ICD-10-CM

## 2024-02-28 DIAGNOSIS — F33.41 RECURRENT MAJOR DEPRESSIVE DISORDER, IN PARTIAL REMISSION (H): ICD-10-CM

## 2024-02-28 PROCEDURE — 90834 PSYTX W PT 45 MINUTES: CPT | Mod: 95 | Performed by: SOCIAL WORKER

## 2024-02-28 NOTE — PROGRESS NOTES
[facility]                                     Progress Note    Patient Name: Michelle Jalloh  Date: 2/28/2024       Service Type: Individual      Session Start Time: 9:03 AM Session End Time: 9:54 AM     Session Length: 38-52 minutes    Session #: 8    Attendees: Client attended alone    Service Modality:  Video Visit:      Provider verified identity through the following two step process.  Patient provided:  Patient is known previously to provider    Telemedicine Visit: The patient's condition can be safely assessed and treated via synchronous audio and visual telemedicine encounter.      Reason for Telemedicine Visit: Services only offered telehealth    Originating Site (Patient Location): Patient's home    Distant Site (Provider Location): [facility], [place], [place]    Consent:  The patient/guardian has verbally consented to: the potential risks and benefits of telemedicine (video visit) versus in person care; bill my insurance or make self-payment for services provided; and responsibility for payment of non-covered services.     Patient would like the video invitation sent by:  My Chart    Mode of Communication:  [facility]    Distant Location (Provider):  On-site    As the provider I attest to compliance with applicable laws and regulations related to telemedicine.    DATA  Interactive Complexity: No  Crisis: No        Progress Since Last Session (Related to Symptoms / Goals / Homework):   Symptoms:  no change since previous appointment      Homework: Achieved / completed to satisfaction   Setting boundaries, using assertive communication     Episode of Care Goals: Satisfactory progress - ACTION (Actively working towards change); Intervened by reinforcing change plan / affirming steps taken     Current / Ongoing Stressors and Concerns:   sister's mental health   Dynamics in relationship with sister   Father's health     Treatment Objective(s) Addressed in  This Session:   use assertive communication skills    Setting healthy boundaries and reinforcing boundaries  Identify trauma symptoms     Intervention:   EMDR: provided psychoeducation, addressed questions, identified trauma symptoms and impact on current functioning   Engaged in active listening   Reviewed flowsheets    Assessments completed prior to visit:  The following assessments were completed by patient for this visit:  PHQ9:       5/8/2023    11:19 PM 2/21/2024    10:37 PM   PHQ-9 SCORE   PHQ-9 Total Score MyChart 7 (Mild depression) 0   PHQ-9 Total Score 7 0     GAD7:       5/8/2023    11:20 PM 2/21/2024    10:38 PM   RJ-7 SCORE   Total Score 2 (minimal anxiety) 3 (minimal anxiety)   Total Score 2 3         ASSESSMENT: Current Emotional / Mental Status (status of significant symptoms):   Risk status (Self / Other harm or suicidal ideation)   Patient denies current fears or concerns for personal safety.   Patient denies current or recent suicidal ideation or behaviors.   Patient denies current or recent homicidal ideation or behaviors.   Patient denies current or recent self injurious behavior or ideation.   Patient denies other safety concerns.   Patient reports there has been no change in risk factors since their last session.     Patient reports there has been no change in protective factors since their last session.     Recommended that patient call 911 or go to the local ED should there be a change in any of these risk factors.     Appearance:   Appropriate    Eye Contact:   Good    Psychomotor Behavior: Normal    Attitude:   Cooperative  Interested Pleasant   Orientation:   All   Speech    Rate / Production: Talkative    Volume:  Normal    Mood:    Anxious  Sad    Affect:    Appropriate Tearful   Thought Content:  Clear  Rumination    Thought Form:  Coherent  Goal Directed  Logical    Insight:    Good      Medication Review:   No current psychiatric medications prescribed     Medication  Compliance:   NA     Changes in Health Issues:   None reported     Chemical Use Review:  Substance Use: No Use     Tobacco Use: None    Diagnosis:  Other Specified Trauma and Stressor Related Disorder   Major Depressive Disorder, Mild, Recurrent, in partial remission    Collateral Reports Completed:   Not Applicable    PLAN: (Patient Tasks / Therapist Tasks / Other)  EMDR: Therapist sent information and encouraged client to review.  Client will continue using assertive communication strategies and set boundaries.    Jessie Rivas, [facility] 2/28/2024    ______________________________________________________________________    Individual Treatment Plan    Patient's Name: Michelle Jalloh  YOB: 1975    Date of Creation: 6/29/2023  Date Treatment Plan Last Reviewed/Revised: 2/28/2024    DSM5 Diagnoses:Other Specified Trauma and Stressor Related Disorder   Major Depressive Disorder, Mild, Recurrent, in partial remission  Psychosocial / Contextual Factors: lives with son and , father and sister live in [place]  PROMIS (reviewed every 90 days):     PROMIS 10-Global Health (only subscores and total score):       5/8/2023    11:38 PM 8/31/2023     4:21 PM 12/27/2023    10:25 AM   PROMIS-10 Scores Only   Global Mental Health Score 12 17 15   Global Physical Health Score 16 17 18   PROMIS TOTAL - SUBSCORES 28 34 33       Referral / Collaboration:  Referral to another professional/service is not indicated at this time..    Anticipated number of session for this episode of care:  will review in 90 days  Anticipation frequency of session:  every 2-3 weeks  Anticipated Duration of each session: 38-52 minutes  Treatment plan will be reviewed in 90 days or when goals have been changed.       MeasurableTreatment Goal(s) related to diagnosis / functional impairment(s)  Goal 1: Patient will further develop skills for interpersonal relationships.    Objective #A (Patient Action)    Patient will  learn and implement  healthy boundaries .   Status: New - Date: 6/29/2023  , continued date: 11/3/2023, continued date: 2/28/2024    Intervention(s)  Therapist will teach  strategies on establishing healthy boundaries.  Therapist will engage client in identifying areas to establish boundaries .    Objective #B  Patient will  learn and implement assertive communication skills .   Status: New - Date: 6/29/2023  , continued date: 11/3/2023, continued date: 2/28/2024    Intervention(s)  Therapist will  teach and model use of assertive communication skills .    Objective #C  Patient will use cognitive strategies identified in therapy to challenge anxious thoughts that can be barrier to communication.  Status: New - Date: 6/29/2023  , continued date: 11/3/2023, continued date: 2/28/2024    Intervention(s)  Therapist will  teach the CBT model, including cognitive distortions and cognitive restructuring techniques .    Goal 2: Patient will be able to recall the traumatic events without becoming overwhelmed with negative thoughts, feelings, or urges.    Objective #A (Patient Action)    Status: New - Date: 2/28/2024     Patient will describe the history of and nature of trauma symptoms    Intervention(s)  Therapist will assess the client's frequency, intensity, duration, and history of trauma symptoms and their impact on functioning.    Objective #B (Patient Action)  Status: New Date: 2/28/2024    Patient will cooperate with eye movement desensitization and reprocessing (EMDR) to reduce emotional reaction to traumatic event(s)    Intervention(s)  Therapist will utilize EMDR to reduce the client's emotional reactivity to the traumatic event(s).    Objective #C (Patient Action)  Status: New Date: 2/28/2024    Patient will learn and implement calming and coping strategies to manage trauma symptoms.    Intervention(s)  Therapist will teach grounding techniques, distress tolerance, and emotion regulation techniques.       Patient has reviewed and  agreed to the above plan.      Jessie Rivas, [facility]  June 29, 2023  Jessie Rivas, [facility]  11/3/2023   Jessie Rivas, [facility]  2/28/2024

## 2024-04-20 ENCOUNTER — HEALTH MAINTENANCE LETTER (OUTPATIENT)
Age: 49
End: 2024-04-20

## 2024-05-09 ENCOUNTER — VIRTUAL VISIT (OUTPATIENT)
Dept: PSYCHOLOGY | Facility: CLINIC | Age: 49
End: 2024-05-09
Payer: COMMERCIAL

## 2024-05-09 DIAGNOSIS — F33.41 RECURRENT MAJOR DEPRESSIVE DISORDER, IN PARTIAL REMISSION (H): ICD-10-CM

## 2024-05-09 DIAGNOSIS — F43.12 CHRONIC POST-TRAUMATIC STRESS DISORDER: Primary | ICD-10-CM

## 2024-05-09 PROCEDURE — 90834 PSYTX W PT 45 MINUTES: CPT | Mod: 95 | Performed by: SOCIAL WORKER

## 2024-05-09 NOTE — PROGRESS NOTES
[facility]                                     Progress Note    Patient Name: Michelle Jalloh  Date: 5/9/2024       Service Type: Individual      Session Start Time: 12:33 PM Session End Time: 1:25 PM     Session Length: 38-52 minutes    Session #: 9    Attendees: Client attended alone    Service Modality:  Video Visit:      Provider verified identity through the following two step process.  Patient provided:  Patient is known previously to provider    Telemedicine Visit: The patient's condition can be safely assessed and treated via synchronous audio and visual telemedicine encounter.      Reason for Telemedicine Visit: Services only offered telehealth    Originating Site (Patient Location): Patient's home    Distant Site (Provider Location): Provider's Remote Location    Consent:  The patient/guardian has verbally consented to: the potential risks and benefits of telemedicine (video visit) versus in person care; bill my insurance or make self-payment for services provided; and responsibility for payment of non-covered services.     Patient would like the video invitation sent by:  My Chart    Mode of Communication:  Video Conference via [facility]well    Distant Location (Provider):  Off-site    As the provider I attest to compliance with applicable laws and regulations related to telemedicine.    DATA  Interactive Complexity: No  Crisis: No        Progress Since Last Session (Related to Symptoms / Goals / Homework):   Symptoms:  no change since previous appointment      Homework: Achieved / completed to satisfaction   Setting boundaries, using assertive communication     Episode of Care Goals: Satisfactory progress - ACTION (Actively working towards change); Intervened by reinforcing change plan / affirming steps taken     Current / Ongoing Stressors and Concerns:   sister's mental health   Dynamics in relationship with sister   Father's health   Upcoming visit with family     Treatment  Objective(s) Addressed in This Session:   use assertive communication skills    Setting healthy boundaries and reinforcing boundaries  Identify trauma symptoms  Identify 1 thought/fear that contributes to anxiety symptoms     Intervention:   EMDR: provided psychoeducation on window of tolerance   Engaged in active listening   CBT: reinforced behavior changes    Assessments completed prior to visit:  The following assessments were completed by patient for this visit:  None     ASSESSMENT: Current Emotional / Mental Status (status of significant symptoms):   Risk status (Self / Other harm or suicidal ideation)   Patient denies current fears or concerns for personal safety.   Patient denies current or recent suicidal ideation or behaviors.   Patient denies current or recent homicidal ideation or behaviors.   Patient denies current or recent self injurious behavior or ideation.   Patient denies other safety concerns.   Patient reports there has been no change in risk factors since their last session.     Patient reports there has been no change in protective factors since their last session.     Recommended that patient call 911 or go to the local ED should there be a change in any of these risk factors.     Appearance:   Appropriate    Eye Contact:   Good    Psychomotor Behavior: Normal    Attitude:   Cooperative  Interested Pleasant   Orientation:   All   Speech    Rate / Production: Talkative    Volume:  Normal    Mood:    Anxious    Affect:    Appropriate    Thought Content:  Clear  Rumination    Thought Form:  Coherent  Goal Directed  Logical    Insight:    Good      Medication Review:   No current psychiatric medications prescribed     Medication Compliance:   NA     Changes in Health Issues:   None reported     Chemical Use Review:  Substance Use: No Use     Tobacco Use: None    Diagnosis:  Other Specified Trauma and Stressor Related Disorder   Major Depressive Disorder, Mild, Recurrent, in partial  remission    Collateral Reports Completed:   Not Applicable    PLAN: (Patient Tasks / Therapist Tasks / Other)  EMDR: Therapist previously sent information and encouraged client to review.  Client will continue using assertive communication strategies and set boundaries.  Therapist will continue to support client with relationship with her sister.    Jessie Rivas, [facility] 5/9/2024    ______________________________________________________________________    Individual Treatment Plan    Patient's Name: Michelle Jalloh  YOB: 1975    Date of Creation: 6/29/2023  Date Treatment Plan Last Reviewed/Revised: 2/28/2024    DSM5 Diagnoses:Other Specified Trauma and Stressor Related Disorder   Major Depressive Disorder, Mild, Recurrent, in partial remission  Psychosocial / Contextual Factors: lives with son and , father and sister live in [place]  PROMIS (reviewed every 90 days):     PROMIS 10-Global Health (only subscores and total score):       5/8/2023    11:38 PM 8/31/2023     4:21 PM 12/27/2023    10:25 AM 5/9/2024     8:06 AM   PROMIS-10 Scores Only   Global Mental Health Score 12 17 15 15   Global Physical Health Score 16 17 18 16   PROMIS TOTAL - SUBSCORES 28 34 33 31       Referral / Collaboration:  Referral to another professional/service is not indicated at this time..    Anticipated number of session for this episode of care:  will review in 90 days  Anticipation frequency of session:  every 2-3 weeks  Anticipated Duration of each session: 38-52 minutes  Treatment plan will be reviewed in 90 days or when goals have been changed.       MeasurableTreatment Goal(s) related to diagnosis / functional impairment(s)  Goal 1: Patient will further develop skills for interpersonal relationships.    Objective #A (Patient Action)    Patient will  learn and implement healthy boundaries .   Status: New - Date: 6/29/2023  , continued date: 11/3/2023, continued date: 2/28/2024    Intervention(s)  Therapist  will teach  strategies on establishing healthy boundaries.  Therapist will engage client in identifying areas to establish boundaries .    Objective #B  Patient will  learn and implement assertive communication skills .   Status: New - Date: 6/29/2023  , continued date: 11/3/2023, continued date: 2/28/2024    Intervention(s)  Therapist will  teach and model use of assertive communication skills .    Objective #C  Patient will use cognitive strategies identified in therapy to challenge anxious thoughts that can be barrier to communication.  Status: New - Date: 6/29/2023  , continued date: 11/3/2023, continued date: 2/28/2024    Intervention(s)  Therapist will  teach the CBT model, including cognitive distortions and cognitive restructuring techniques .    Goal 2: Patient will be able to recall the traumatic events without becoming overwhelmed with negative thoughts, feelings, or urges.    Objective #A (Patient Action)    Status: New - Date: 2/28/2024     Patient will describe the history of and nature of trauma symptoms    Intervention(s)  Therapist will assess the client's frequency, intensity, duration, and history of trauma symptoms and their impact on functioning.    Objective #B (Patient Action)  Status: New Date: 2/28/2024    Patient will cooperate with eye movement desensitization and reprocessing (EMDR) to reduce emotional reaction to traumatic event(s)    Intervention(s)  Therapist will utilize EMDR to reduce the client's emotional reactivity to the traumatic event(s).    Objective #C (Patient Action)  Status: New Date: 2/28/2024    Patient will learn and implement calming and coping strategies to manage trauma symptoms.    Intervention(s)  Therapist will teach grounding techniques, distress tolerance, and emotion regulation techniques.       Patient has reviewed and agreed to the above plan.      Jessie Rivas, [facility]  June 29, 2023  Jessie Rivas, [facility]  11/3/2023   Jess[facility] Rivas [facility]  2/28/2024

## 2024-05-14 ENCOUNTER — VIRTUAL VISIT (OUTPATIENT)
Dept: PSYCHOLOGY | Facility: CLINIC | Age: 49
End: 2024-05-14
Payer: COMMERCIAL

## 2024-05-14 DIAGNOSIS — F43.12 CHRONIC POST-TRAUMATIC STRESS DISORDER: Primary | ICD-10-CM

## 2024-05-14 DIAGNOSIS — F33.41 RECURRENT MAJOR DEPRESSIVE DISORDER, IN PARTIAL REMISSION (H): ICD-10-CM

## 2024-05-14 PROCEDURE — 90837 PSYTX W PT 60 MINUTES: CPT | Mod: 95 | Performed by: SOCIAL WORKER

## 2024-05-14 NOTE — PROGRESS NOTES
[facility]                                     Progress Note    Patient Name: Michelle Mchugh  Date: 5/14/2024       Service Type: Individual      Session Start Time: 1:03 PM Session End Time: 1:57 PM     Session Length: 53+ minutes    Extended Session (53+ minutes): PROLONGED SERVICE IN THE OUTPATIENT SETTING REQUIRING DIRECT (FACE-TO-FACE) PATIENT CONTACT BEYOND THE USUAL SERVICE:    - Longer session due to limited access to mental health appointments and necessity to address patient's distress / complexity    Session #: 10    Attendees: Client attended alone    Service Modality:  Video Visit:      Provider verified identity through the following two step process.  Patient provided:  Patient is known previously to provider    Telemedicine Visit: The patient's condition can be safely assessed and treated via synchronous audio and visual telemedicine encounter.      Reason for Telemedicine Visit: Services only offered telehealth    Originating Site (Patient Location): Patient's home    Distant Site (Provider Location): Provider's Remote Location    Consent:  The patient/guardian has verbally consented to: the potential risks and benefits of telemedicine (video visit) versus in person care; bill my insurance or make self-payment for services provided; and responsibility for payment of non-covered services.     Patient would like the video invitation sent by:  My Chart    Mode of Communication:  Video Conference via [employer]    Distant Location (Provider):  Off-site    As the provider I attest to compliance with applicable laws and regulations related to telemedicine.    DATA  Interactive Complexity: No  Crisis: No        Progress Since Last Session (Related to Symptoms / Goals / Homework):   Symptoms:  no change since previous appointment      Homework: Achieved / completed to satisfaction     Episode of Care Goals: Satisfactory progress - ACTION (Actively working towards change); Intervened by  reinforcing change plan / affirming steps taken     Current / Ongoing Stressors and Concerns:   sister's mental health   Dynamics in relationship with sister   Father's health   Upcoming visit with family     Treatment Objective(s) Addressed in This Session:   use assertive communication skills    Setting healthy boundaries and reinforcing boundaries  Identify trauma symptoms     Intervention:   Modeled assertive communication   Engaged in active listening   CBT: reinforced behavior changes   Engaged client in identifying areas within her control    Provided psychoeducation on trauma symptoms    Assessments completed prior to visit:  The following assessments were completed by patient for this visit:  None     ASSESSMENT: Current Emotional / Mental Status (status of significant symptoms):   Risk status (Self / Other harm or suicidal ideation)   Patient denies current fears or concerns for personal safety.   Patient denies current or recent suicidal ideation or behaviors.   Patient denies current or recent homicidal ideation or behaviors.   Patient denies current or recent self injurious behavior or ideation.   Patient denies other safety concerns.   Patient reports there has been no change in risk factors since their last session.     Patient reports there has been no change in protective factors since their last session.     Recommended that patient call 911 or go to the local ED should there be a change in any of these risk factors.     Appearance:   Appropriate    Eye Contact:   Good    Psychomotor Behavior: Normal    Attitude:   Cooperative  Interested Pleasant   Orientation:   All   Speech    Rate / Production: Talkative    Volume:  Normal    Mood:    Anxious  Sad    Affect:    Appropriate minimally tearful   Thought Content:  Clear    Thought Form:  Coherent  Goal Directed  Logical    Insight:    Good      Medication Review:   No current psychiatric medications prescribed     Medication  Compliance:   NA     Changes in Health Issues:   None reported     Chemical Use Review:  Substance Use: No Use     Tobacco Use: None    Diagnosis:  Other Specified Trauma and Stressor Related Disorder   Major Depressive Disorder, Mild, Recurrent, in partial remission    Collateral Reports Completed:   Not Applicable    PLAN: (Patient Tasks / Therapist Tasks / Other)  EMDR: Therapist previously sent information and encouraged client to review.  Client will continue using assertive communication strategies and set boundaries.  Therapist will continue to support client with relationship with her sister.  Therapist encouraged client to identify areas within her control and areas outside her control.  Client is engaging in [employer] workbook.  Client is on the waitlist for a sooner appointment.  Client was encouraged to contact therapist if needed.    Jessie Rivas, [facility] 5/14/2024    ______________________________________________________________________    Individual Treatment Plan    Patient's Name: Michelle Jalloh  YOB: 1975    Date of Creation: 6/29/2023  Date Treatment Plan Last Reviewed/Revised: 2/28/2024    DSM5 Diagnoses:Other Specified Trauma and Stressor Related Disorder   Major Depressive Disorder, Mild, Recurrent, in partial remission  Psychosocial / Contextual Factors: lives with son and , father and sister live in [place]  PROMIS (reviewed every 90 days):     PROMIS 10-Global Health (only subscores and total score):       5/8/2023    11:38 PM 8/31/2023     4:21 PM 12/27/2023    10:25 AM 5/9/2024     8:06 AM   PROMIS-10 Scores Only   Global Mental Health Score 12 17 15 15   Global Physical Health Score 16 17 18 16   PROMIS TOTAL - SUBSCORES 28 34 33 31       Referral / Collaboration:  Referral to another professional/service is not indicated at this time..    Anticipated number of session for this episode of care:  will review in 90 days  Anticipation frequency of  session:  every 2-3 weeks  Anticipated Duration of each session: 38-52 minutes  Treatment plan will be reviewed in 90 days or when goals have been changed.       MeasurableTreatment Goal(s) related to diagnosis / functional impairment(s)  Goal 1: Patient will further develop skills for interpersonal relationships.    Objective #A (Patient Action)    Patient will  learn and implement healthy boundaries .   Status: New - Date: 6/29/2023  , continued date: 11/3/2023, continued date: 2/28/2024    Intervention(s)  Therapist will teach  strategies on establishing healthy boundaries.  Therapist will engage client in identifying areas to establish boundaries .    Objective #B  Patient will  learn and implement assertive communication skills .   Status: New - Date: 6/29/2023  , continued date: 11/3/2023, continued date: 2/28/2024    Intervention(s)  Therapist will  teach and model use of assertive communication skills .    Objective #C  Patient will use cognitive strategies identified in therapy to challenge anxious thoughts that can be barrier to communication.  Status: New - Date: 6/29/2023  , continued date: 11/3/2023, continued date: 2/28/2024    Intervention(s)  Therapist will  teach the CBT model, including cognitive distortions and cognitive restructuring techniques .    Goal 2: Patient will be able to recall the traumatic events without becoming overwhelmed with negative thoughts, feelings, or urges.    Objective #A (Patient Action)    Status: New - Date: 2/28/2024     Patient will describe the history of and nature of trauma symptoms    Intervention(s)  Therapist will assess the client's frequency, intensity, duration, and history of trauma symptoms and their impact on functioning.    Objective #B (Patient Action)  Status: New Date: 2/28/2024    Patient will cooperate with eye movement desensitization and reprocessing (EMDR) to reduce emotional reaction to traumatic event(s)    Intervention(s)  Therapist will  utilize EMDR to reduce the client's emotional reactivity to the traumatic event(s).    Objective #C (Patient Action)  Status: New Date: 2/28/2024    Patient will learn and implement calming and coping strategies to manage trauma symptoms.    Intervention(s)  Therapist will teach grounding techniques, distress tolerance, and emotion regulation techniques.       Patient has reviewed and agreed to the above plan.      Jessie Rivas, [facility]  June 29, 2023  Jessie Rivas, [facility]  11/3/2023   Jessie Rivas, [facility]  2/28/2024

## 2024-06-12 ENCOUNTER — ANCILLARY PROCEDURE (OUTPATIENT)
Dept: MAMMOGRAPHY | Facility: CLINIC | Age: 49
End: 2024-06-12
Attending: FAMILY MEDICINE
Payer: COMMERCIAL

## 2024-06-12 DIAGNOSIS — Z12.31 VISIT FOR SCREENING MAMMOGRAM: ICD-10-CM

## 2024-06-12 PROCEDURE — 77067 SCR MAMMO BI INCL CAD: CPT | Mod: TC | Performed by: RADIOLOGY

## 2024-06-12 PROCEDURE — 77063 BREAST TOMOSYNTHESIS BI: CPT | Mod: TC | Performed by: RADIOLOGY

## 2024-06-14 SDOH — HEALTH STABILITY: PHYSICAL HEALTH: ON AVERAGE, HOW MANY MINUTES DO YOU ENGAGE IN EXERCISE AT THIS LEVEL?: 60 MIN

## 2024-06-14 SDOH — HEALTH STABILITY: PHYSICAL HEALTH: ON AVERAGE, HOW MANY DAYS PER WEEK DO YOU ENGAGE IN MODERATE TO STRENUOUS EXERCISE (LIKE A BRISK WALK)?: 5 DAYS

## 2024-06-14 ASSESSMENT — SOCIAL DETERMINANTS OF HEALTH (SDOH): HOW OFTEN DO YOU GET TOGETHER WITH FRIENDS OR RELATIVES?: THREE TIMES A WEEK

## 2024-07-18 ENCOUNTER — VIRTUAL VISIT (OUTPATIENT)
Dept: PSYCHOLOGY | Facility: CLINIC | Age: 49
End: 2024-07-18
Payer: COMMERCIAL

## 2024-07-18 DIAGNOSIS — F33.41 RECURRENT MAJOR DEPRESSIVE DISORDER, IN PARTIAL REMISSION (H): ICD-10-CM

## 2024-07-18 DIAGNOSIS — F43.12 CHRONIC POST-TRAUMATIC STRESS DISORDER: Primary | ICD-10-CM

## 2024-07-18 PROCEDURE — 90837 PSYTX W PT 60 MINUTES: CPT | Mod: 95 | Performed by: SOCIAL WORKER

## 2024-07-18 NOTE — PROGRESS NOTES
[facility]                                     Progress Note    Patient Name: Michelle Mchugh  Date: 7/18/2024       Service Type: Individual      Session Start Time: 9:31 AM Session End Time: 10:28 AM     Session Length: 53+ minutes    Extended Session (53+ minutes): PROLONGED SERVICE IN THE OUTPATIENT SETTING REQUIRING DIRECT (FACE-TO-FACE) PATIENT CONTACT BEYOND THE USUAL SERVICE:    - Longer session due to limited access to mental health appointments and necessity to address patient's distress / complexity    Session #: 11    Attendees: Client attended alone    Service Modality:  Video Visit:      Provider verified identity through the following two step process.  Patient provided:  Patient is known previously to provider    Telemedicine Visit: The patient's condition can be safely assessed and treated via synchronous audio and visual telemedicine encounter.      Reason for Telemedicine Visit: Services only offered telehealth    Originating Site (Patient Location): Patient's home    Distant Site (Provider Location): Provider's Remote Location    Consent:  The patient/guardian has verbally consented to: the potential risks and benefits of telemedicine (video visit) versus in person care; bill my insurance or make self-payment for services provided; and responsibility for payment of non-covered services.     Patient would like the video invitation sent by:  My Chart    Mode of Communication:  Video Conference via [employer]    Distant Location (Provider):  Off-site    As the provider I attest to compliance with applicable laws and regulations related to telemedicine.    DATA  Interactive Complexity: No  Crisis: No        Progress Since Last Session (Related to Symptoms / Goals / Homework):   Symptoms:  no change since previous appointment      Homework: Achieved / completed to satisfaction     Episode of Care Goals: Satisfactory progress - ACTION (Actively working towards change); Intervened by  reinforcing change plan / affirming steps taken     Current / Ongoing Stressors and Concerns:   Dynamics in relationship with sister   Father's health     Treatment Objective(s) Addressed in This Session:   use assertive communication skills    Setting healthy boundaries and reinforcing boundaries  Identify trauma symptoms  Identify 1 fear/thought that contributes to anxiety symptoms     Intervention:   Provided psychoeducation on boundary setting   Engaged in active listening   EMDR: addressed questions, provided psychoeducation   Engaged client in identifying areas within her control     Assessments completed prior to visit:  The following assessments were completed by patient for this visit:  None     ASSESSMENT: Current Emotional / Mental Status (status of significant symptoms):   Risk status (Self / Other harm or suicidal ideation)   Patient denies current fears or concerns for personal safety.   Patient denies current or recent suicidal ideation or behaviors.   Patient denies current or recent homicidal ideation or behaviors.   Patient denies current or recent self injurious behavior or ideation.   Patient denies other safety concerns.   Patient reports there has been no change in risk factors since their last session.     Patient reports there has been no change in protective factors since their last session.     Recommended that patient call 911 or go to the local ED should there be a change in any of these risk factors.     Appearance:   Appropriate    Eye Contact:   Good    Psychomotor Behavior: Normal    Attitude:   Cooperative  Interested   Orientation:   All   Speech    Rate / Production: Talkative    Volume:  Normal    Mood:    Anxious    Affect:    Mood Congruent    Thought Content:  Clear  Rumination    Thought Form:  Coherent  Goal Directed    Insight:    Good      Medication Review:   No current psychiatric medications prescribed     Medication Compliance:   NA     Changes in Health Issues:   None  reported     Chemical Use Review:  Substance Use: No Use     Tobacco Use: None    Diagnosis:  Other Specified Trauma and Stressor Related Disorder   Major Depressive Disorder, Mild, Recurrent, in partial remission    Collateral Reports Completed:   Not Applicable    PLAN: (Patient Tasks / Therapist Tasks / Other)  EMDR: client voiced agreement, therapist to engage in history taking, completion of NEW II, engaging client in container and safe/calm state exercises  Client will continue using assertive communication strategies and set boundaries.  Therapist will continue to support client with relationship with her sister.  Therapist encouraged client to identify areas within her control and areas outside her control.  Client is reading, Surviving a borderline parent    Jessie Rivas, [facility] 7/18/2024    ______________________________________________________________________    Individual Treatment Plan    Patient's Name: Michelle Jalloh  YOB: 1975    Date of Creation: 6/29/2023  Date Treatment Plan Last Reviewed/Revised: 7/18/2024    DSM5 Diagnoses:Other Specified Trauma and Stressor Related Disorder   Major Depressive Disorder, Mild, Recurrent, in partial remission  Psychosocial / Contextual Factors: lives with son and , father and sister live in [place]  PROMIS (reviewed every 90 days):     PROMIS 10-Global Health (only subscores and total score):       5/8/2023    11:38 PM 8/31/2023     4:21 PM 12/27/2023    10:25 AM 5/9/2024     8:06 AM   PROMIS-10 Scores Only   Global Mental Health Score 12 17 15 15   Global Physical Health Score 16 17 18 16   PROMIS TOTAL - SUBSCORES 28 34 33 31       Referral / Collaboration:  Referral to another professional/service is not indicated at this time..    Anticipated number of session for this episode of care:  will review in 90 days  Anticipation frequency of session:  every 2-3 weeks  Anticipated Duration of each session: 38-52 minutes  Treatment plan will  be reviewed in 90 days or when goals have been changed.       MeasurableTreatment Goal(s) related to diagnosis / functional impairment(s)  Goal 1: Patient will further develop skills for interpersonal relationships.    Objective #A (Patient Action)    Patient will  learn and implement healthy boundaries .   Status: New - Date: 6/29/2023  , continued date: 11/3/2023, continued date: 2/28/2024, continued date: 7/18/2024    Intervention(s)  Therapist will teach  strategies on establishing healthy boundaries.  Therapist will engage client in identifying areas to establish boundaries .    Objective #B  Patient will  learn and implement assertive communication skills .   Status: New - Date: 6/29/2023  , continued date: 11/3/2023, continued date: 2/28/2024, continued date: 7/18/2024    Intervention(s)  Therapist will  teach and model use of assertive communication skills .    Objective #C  Patient will use cognitive strategies identified in therapy to challenge anxious thoughts that can be barrier to communication.  Status: New - Date: 6/29/2023  , continued date: 11/3/2023, continued date: 2/28/2024, continued date: 7/18/2024    Intervention(s)  Therapist will  teach the CBT model, including cognitive distortions and cognitive restructuring techniques .    Goal 2: Patient will be able to recall the traumatic events without becoming overwhelmed with negative thoughts, feelings, or urges.    Objective #A (Patient Action)    Status: New - Date: 2/28/2024     Patient will describe the history of and nature of trauma symptoms    Intervention(s)  Therapist will assess the client's frequency, intensity, duration, and history of trauma symptoms and their impact on functioning.    Objective #B (Patient Action)  Status: New Date: 2/28/2024, continued date: 7/18/2024    Patient will cooperate with eye movement desensitization and reprocessing (EMDR) to reduce emotional reaction to traumatic event(s)    Intervention(s)  Therapist  will utilize EMDR to reduce the client's emotional reactivity to the traumatic event(s).    Objective #C (Patient Action)  Status: New Date: 2/28/2024, continued date: 7/18/2024    Patient will learn and implement calming and coping strategies to manage trauma symptoms.    Intervention(s)  Therapist will teach grounding techniques, distress tolerance, and emotion regulation techniques.     Patient has reviewed and agreed to the above plan.      Jessie Rivas, [facility]  June 29, 2023  Jessie Rivas, [facility]  11/3/2023   Jessie Rivas, [facility]  2/28/2024  Jessie Rivas, [facility]  7/18/2024

## 2024-07-22 SDOH — HEALTH STABILITY: PHYSICAL HEALTH: ON AVERAGE, HOW MANY DAYS PER WEEK DO YOU ENGAGE IN MODERATE TO STRENUOUS EXERCISE (LIKE A BRISK WALK)?: 5 DAYS

## 2024-07-22 SDOH — HEALTH STABILITY: PHYSICAL HEALTH: ON AVERAGE, HOW MANY MINUTES DO YOU ENGAGE IN EXERCISE AT THIS LEVEL?: 60 MIN

## 2024-07-22 ASSESSMENT — SOCIAL DETERMINANTS OF HEALTH (SDOH): HOW OFTEN DO YOU GET TOGETHER WITH FRIENDS OR RELATIVES?: THREE TIMES A WEEK

## 2024-07-26 ENCOUNTER — OFFICE VISIT (OUTPATIENT)
Dept: FAMILY MEDICINE | Facility: CLINIC | Age: 49
End: 2024-07-26
Payer: COMMERCIAL

## 2024-07-26 VITALS
HEIGHT: 64 IN | HEART RATE: 56 BPM | RESPIRATION RATE: 16 BRPM | OXYGEN SATURATION: 99 % | TEMPERATURE: 97.6 F | DIASTOLIC BLOOD PRESSURE: 79 MMHG | WEIGHT: 165.5 LBS | SYSTOLIC BLOOD PRESSURE: 122 MMHG | BODY MASS INDEX: 28.25 KG/M2

## 2024-07-26 DIAGNOSIS — Z00.00 ROUTINE GENERAL MEDICAL EXAMINATION AT A HEALTH CARE FACILITY: ICD-10-CM

## 2024-07-26 DIAGNOSIS — Z12.4 CERVICAL CANCER SCREENING: Primary | ICD-10-CM

## 2024-07-26 DIAGNOSIS — C73 PAPILLARY THYROID CARCINOMA (H): ICD-10-CM

## 2024-07-26 LAB
CHOLEST SERPL-MCNC: 216 MG/DL
FASTING STATUS PATIENT QL REPORTED: NO
FASTING STATUS PATIENT QL REPORTED: NO
GLUCOSE SERPL-MCNC: 96 MG/DL (ref 70–99)
HDLC SERPL-MCNC: 73 MG/DL
LDLC SERPL CALC-MCNC: 127 MG/DL
NONHDLC SERPL-MCNC: 143 MG/DL
TRIGL SERPL-MCNC: 81 MG/DL

## 2024-07-26 PROCEDURE — 36415 COLL VENOUS BLD VENIPUNCTURE: CPT | Performed by: FAMILY MEDICINE

## 2024-07-26 PROCEDURE — 90480 ADMN SARSCOV2 VAC 1/ONLY CMP: CPT | Performed by: FAMILY MEDICINE

## 2024-07-26 PROCEDURE — 90750 HZV VACC RECOMBINANT IM: CPT | Performed by: FAMILY MEDICINE

## 2024-07-26 PROCEDURE — 82947 ASSAY GLUCOSE BLOOD QUANT: CPT | Performed by: FAMILY MEDICINE

## 2024-07-26 PROCEDURE — 87624 HPV HI-RISK TYP POOLED RSLT: CPT | Performed by: FAMILY MEDICINE

## 2024-07-26 PROCEDURE — 80061 LIPID PANEL: CPT | Performed by: FAMILY MEDICINE

## 2024-07-26 PROCEDURE — 90472 IMMUNIZATION ADMIN EACH ADD: CPT | Performed by: FAMILY MEDICINE

## 2024-07-26 PROCEDURE — G0145 SCR C/V CYTO,THINLAYER,RESCR: HCPCS | Performed by: FAMILY MEDICINE

## 2024-07-26 PROCEDURE — 90471 IMMUNIZATION ADMIN: CPT | Performed by: FAMILY MEDICINE

## 2024-07-26 PROCEDURE — 99396 PREV VISIT EST AGE 40-64: CPT | Mod: 25 | Performed by: FAMILY MEDICINE

## 2024-07-26 PROCEDURE — 90746 HEPB VACCINE 3 DOSE ADULT IM: CPT | Performed by: FAMILY MEDICINE

## 2024-07-26 PROCEDURE — 91320 SARSCV2 VAC 30MCG TRS-SUC IM: CPT | Performed by: FAMILY MEDICINE

## 2024-07-26 ASSESSMENT — PAIN SCALES - GENERAL: PAINLEVEL: NO PAIN (0)

## 2024-07-26 NOTE — PROGRESS NOTES
"Preventive Care Visit  [facility] MAIKEL Jaimes MD, Family Medicine  Jul 26, 2024      Assessment & Plan     Routine general medical examination at a health care facility    - Lipid panel reflex to direct LDL Non-fasting; Future  - Glucose; Future  - Lipid panel reflex to direct LDL Non-fasting  - Glucose    Cervical cancer screening    - Pap Screen with HPV - Recommended Age 30 - 65 Years    Papillary thyroid carcinoma (H)  Sees endocrine            BMI  Estimated body mass index is 28.41 kg/m  as calculated from the following:    Height as of this encounter: 1.626 m (5' 4\").    Weight as of this encounter: 75.1 kg (165 lb 8 oz).   Weight management plan: pt exercises     Counseling  Appropriate preventive services were addressed with this patient via screening, questionnaire, or discussion as appropriate for fall prevention, nutrition, physical activity, Tobacco-use cessation, weight loss and cognition.  Checklist reviewing preventive services available has been given to the patient.  Reviewed patient's diet, addressing concerns and/or questions.   She is at risk for psychosocial distress and has been provided with information to reduce risk.           Viry Martinez is a 49 year old, presenting for the following:  Physical (Non-Fasting)        7/26/2024    11:11 AM   Additional Questions   Roomed by Hayley Gomez        Health Care Directive  Patient does not have a Health Care Directive or Living Will: Discussed advance care planning with patient; however, patient declined at this time.    HPI  Pt is here for a Physical    She sees Endocrine for Thyroid ca and doing well  Past medical history, family history, medications and social history reviewed today and updated in EPIC.          7/22/2024   General Health   How would you rate your overall physical health? Good   Feel stress (tense, anxious, or unable to sleep) Only a little      (!) STRESS CONCERN      7/22/2024   Nutrition "   Three or more servings of calcium each day? Yes   Diet: Regular (no restrictions)   How many servings of fruit and vegetables per day? 4 or more   How many sweetened beverages each day? 0-1            7/22/2024   Exercise   Days per week of moderate/strenous exercise 5 days   Average minutes spent exercising at this level 60 min            7/22/2024   Social Factors   Frequency of gathering with friends or relatives Three times a week   Worry food won't last until get money to buy more No   Food not last or not have enough money for food? No   Do you have housing? (Housing is defined as stable permanent housing and does not include staying ouside in a car, in a tent, in an abandoned building, in an overnight shelter, or couch-surfing.) Yes   Are you worried about losing your housing? No   Lack of transportation? No   Unable to get utilities (heat,electricity)? No            7/22/2024   Fall Risk   Fallen 2 or more times in the past year? No   Trouble with walking or balance? No             7/22/2024   Dental   Dentist two times every year? Yes            6/14/2024   TB Screening   Were you born outside of the [place]? Yes            Today's PHQ-9 Score:       2/21/2024    10:37 PM   PHQ-9 SCORE   PHQ-9 Total Score MyChart 0   PHQ-9 Total Score 0           7/22/2024   Substance Use   Alcohol more than 3/day or more than 7/wk No   Do you use any other substances recreationally? No        Social History     Tobacco Use    Smoking status: Former     Types: Cigarettes     Start date: 1/29/1999     Passive exposure: Past    Smokeless tobacco: Never   Vaping Use    Vaping status: Never Used   Substance Use Topics    Alcohol use: Yes     Alcohol/week: 0.0 standard drinks of alcohol     Comment: 1/week if that.    Drug use: No           6/12/2024   LAST FHS-7 RESULTS   1st degree relative breast or ovarian cancer No   Any relative bilateral breast cancer No   Any male have breast cancer No   Any ONE woman have BOTH breast AND  ovarian cancer No   Any woman with breast cancer before 50yrs No   2 or more relatives with breast AND/OR ovarian cancer No   2 or more relatives with breast AND/OR bowel cancer No           Pt has had her mammogram         2024   STI Screening   New sexual partner(s) since last STI/HIV test? No        History of abnormal Pap smear: No - age 30- 64 PAP with HPV every 5 years recommended        Latest Ref Rng & Units 2021     9:00 AM 2021     8:53 AM 2018     9:27 AM   PAP / HPV   PAP (Historical)   NIL     HPV 16 DNA NEG^Negative Negative   Negative    HPV 18 DNA NEG^Negative Negative   Negative    Other HR HPV NEG^Negative Negative   Negative      ASCVD Risk   The ASCVD Risk score (Mery DK, et al., 2019) failed to calculate for the following reasons:    Cannot find a previous HDL lab    Cannot find a previous total cholesterol lab           Reviewed and updated as needed this visit by Provider                    Past Medical History:   Diagnosis Date    Acne vulgaris     Adjustment disorder with depressed mood     Discoid lupus erythematosus     History of partial thyroidectomy     Melasma     Motion sickness     Papillary microcarcinoma of thyroid (H)      Past Surgical History:   Procedure Laterality Date    APPENDECTOMY  18     SECTION  2014    COLONOSCOPY N/A 2022    Procedure: COLONOSCOPY;  Surgeon: Leventhal, Thomas Michael, MD;  Location: [facility] OR    THYROIDECTOMY Right 2017    Procedure: THYROIDECTOMY;  Right Thyroid Lobectomy, Isthmysectomy;  Surgeon: Marisa Enamorado MD;  Location: [facility]     OB History   No obstetric history on file.     Lab work is in process  Labs reviewed in EPIC  BP Readings from Last 3 Encounters:   24 122/79   22 110/62   08/15/22 126/72    Wt Readings from Last 3 Encounters:   24 75.1 kg (165 lb 8 oz)   10/09/23 72.6 kg (160 lb)   08/15/22 76.9 kg (169 lb 9.6 oz)                  Patient  Active Problem List   Diagnosis    CARDIOVASCULAR SCREENING; LDL GOAL LESS THAN 160    Family history of thyroid disease    Common wart    Discoid lupus    Anxiety state    Papillary thyroid carcinoma (H)    History of partial thyroidectomy    Cervical adenopathy     Past Surgical History:   Procedure Laterality Date    APPENDECTOMY  18     SECTION  2014    COLONOSCOPY N/A 2022    Procedure: COLONOSCOPY;  Surgeon: Leventhal, Thomas Michael, MD;  Location: [facility] OR    THYROIDECTOMY Right 2017    Procedure: THYROIDECTOMY;  Right Thyroid Lobectomy, Isthmysectomy;  Surgeon: Marisa Enamorado MD;  Location:  OR       Social History     Tobacco Use    Smoking status: Former     Types: Cigarettes     Start date: 1999     Passive exposure: Past    Smokeless tobacco: Never   Substance Use Topics    Alcohol use: Yes     Alcohol/week: 0.0 standard drinks of alcohol     Comment: 1/week if that.     Family History   Problem Relation Age of Onset    Hyperthyroidism Mother         Thyroid    Depression Mother     Hypertension Father     Colon Cancer Father         age 86  and also has Lymphoma    Hypertension Paternal Grandmother     Diabetes Paternal Grandfather     Brain Cancer Maternal Uncle     Breast Cancer No family hx of     Thyroid Cancer No family hx of          Current Outpatient Medications   Medication Sig Dispense Refill    MULTIPLE VITAMIN PO Indications: Pt notes maybe [place] [employer] brand - PRN      Probiotic Product (PROBIOTIC DAILY PO) Take 1 capsule by mouth daily       No Known Allergies  Recent Labs   Lab Test 23  0812 10/17/22  1300 08/15/22  0921 21  1455 19  0911 18  0939   LDL  --   --   --   --   --  95   HDL  --   --   --   --   --  76   TRIG  --   --   --   --   --  53   CR  --   --  0.72 0.61  --   --    GFRESTIMATED  --   --  >90 >90  --   --    POTASSIUM  --   --   --  4.0  --   --    TSH 2.13 1.45 2.21 2.01   < > 2.70    < > = values in this  "interval not displayed.          Review of Systems  CONSTITUTIONAL: NEGATIVE for fever, chills, change in weight  INTEGUMENTARY/SKIN: NEGATIVE for worrisome rashes, moles or lesions  EYES: NEGATIVE for vision changes or irritation  ENT/MOUTH: NEGATIVE for ear, mouth and throat problems  RESP: NEGATIVE for significant cough or SOB  BREAST: NEGATIVE for masses, tenderness or discharge  CV: NEGATIVE for chest pain, palpitations or peripheral edema  GI: NEGATIVE for nausea, abdominal pain, heartburn, or change in bowel habits  : NEGATIVE for frequency, dysuria, or hematuria  MUSCULOSKELETAL: NEGATIVE for significant arthralgias or myalgia  NEURO: NEGATIVE for weakness, dizziness or paresthesias  ENDOCRINE: NEGATIVE for temperature intolerance, skin/hair changes  HEME: NEGATIVE for bleeding problems  PSYCHIATRIC: NEGATIVE for changes in mood or affect     Objective    Exam  /79 (BP Location: Right arm, Patient Position: Chair, Cuff Size: Adult Regular)   Pulse 56   Temp 97.6  F (36.4  C) (Temporal)   Resp 16   Ht 1.626 m (5' 4\")   Wt 75.1 kg (165 lb 8 oz)   LMP 04/01/2024   SpO2 99%   Breastfeeding No   BMI 28.41 kg/m     Estimated body mass index is 28.41 kg/m  as calculated from the following:    Height as of this encounter: 1.626 m (5' 4\").    Weight as of this encounter: 75.1 kg (165 lb 8 oz).    Physical Exam  GENERAL: alert and no distress  EYES: Eyes grossly normal to inspection, PERRL and conjunctivae and sclerae normal  HENT: ear canals and TM's normal, nose and mouth without ulcers or lesions  NECK: no adenopathy, no asymmetry, masses, or scars  RESP: lungs clear to auscultation - no rales, rhonchi or wheezes  BREAST: normal without masses, tenderness or nipple discharge and no palpable axillary masses or adenopathy  CV: regular rate and rhythm, normal S1 S2, no S3 or S4, no murmur, click or rub, no peripheral edema  ABDOMEN: soft, nontender, no hepatosplenomegaly, no masses and bowel sounds " normal   (female): normal female external genitalia, normal urethral meatus, normal vaginal mucosa  MS: no gross musculoskeletal defects noted, no edema  SKIN: no suspicious lesions or rashes  NEURO: Normal strength and tone, mentation intact and speech normal  PSYCH: mentation appears normal, affect normal/bright        Signed Electronically by: Bijal Jaimes MD

## 2024-07-26 NOTE — PATIENT INSTRUCTIONS
Patient Education   Preventive Care Advice   This is general advice given by our system to help you stay healthy. However, your care team may have specific advice just for you. Please talk to your care team about your preventive care needs.  Nutrition  Eat 5 or more servings of fruits and vegetables each day.  Try wheat bread, brown rice and whole grain pasta (instead of white bread, rice, and pasta).  Get enough calcium and vitamin D. Check the label on foods and aim for 100% of the RDA (recommended daily allowance).  Lifestyle  Exercise at least 150 minutes each week  (30 minutes a day, 5 days a week).  Do muscle strengthening activities 2 days a week. These help control your weight and prevent disease.  No smoking.  Wear sunscreen to prevent skin cancer.  Have a dental exam and cleaning every 6 months.  Yearly exams  See your health care team every year to talk about:  Any changes in your health.  Any medicines your care team has prescribed.  Preventive care, family planning, and ways to prevent chronic diseases.  Shots (vaccines)   HPV shots (up to age 26), if you've never had them before.  Hepatitis B shots (up to age 59), if you've never had them before.  COVID-19 shot: Get this shot when it's due.  Flu shot: Get a flu shot every year.  Tetanus shot: Get a tetanus shot every 10 years.  Pneumococcal, hepatitis A, and RSV shots: Ask your care team if you need these based on your risk.  Shingles shot (for age 50 and up)  General health tests  Diabetes screening:  Starting at age 35, Get screened for diabetes at least every 3 years.  If you are younger than age 35, ask your care team if you should be screened for diabetes.  Cholesterol test: At age 39, start having a cholesterol test every 5 years, or more often if advised.  Bone density scan (DEXA): At age 50, ask your care team if you should have this scan for osteoporosis (brittle bones).  Hepatitis C: Get tested at least once in your life.  STIs (sexually  transmitted infections)  Before age 24: Ask your care team if you should be screened for STIs.  After age 24: Get screened for STIs if you're at risk. You are at risk for STIs (including HIV) if:  You are sexually active with more than one person.  You don't use condoms every time.  You or a partner was diagnosed with a sexually transmitted infection.  If you are at risk for HIV, ask about PrEP medicine to prevent HIV.  Get tested for HIV at least once in your life, whether you are at risk for HIV or not.  Cancer screening tests  Cervical cancer screening: If you have a cervix, begin getting regular cervical cancer screening tests starting at age 21.  Breast cancer scan (mammogram): If you've ever had breasts, begin having regular mammograms starting at age 40. This is a scan to check for breast cancer.  Colon cancer screening: It is important to start screening for colon cancer at age 45.  Have a colonoscopy test every 10 years (or more often if you're at risk) Or, ask your provider about stool tests like a FIT test every year or Cologuard test every 3 years.  To learn more about your testing options, visit:   .  For help making a decision, visit:   https://bit.ly/ch65890.  Prostate cancer screening test: If you have a prostate, ask your care team if a prostate cancer screening test (PSA) at age 55 is right for you.  Lung cancer screening: If you are a current or former smoker ages 50 to 80, ask your care team if ongoing lung cancer screenings are right for you.  For informational purposes only. Not to replace the advice of your health care provider. Copyright   2023 [facility]. All rights reserved. Clinically reviewed by the [facility]. [facility] 611558 - REV 01/24.  Eating Healthy Foods: Care Instructions  With every meal, you can make healthy food choices. Try to eat a variety of fruits, vegetables, whole grains, lean proteins, and low-fat dairy products. This can help  "you get the right balance of nutrients, including vitamins and minerals. Small changes add up over time. You can start by adding one healthy food to your meals each day.    Try to make half your plate fruits and vegetables, one-fourth whole grains, and one-fourth lean proteins. Try including dairy with your meals.   Eat more fruits and vegetables. Try to have them with most meals and snacks.   Foods for healthy eating    Fruits    These can be fresh, frozen, canned, or dried.  Try to choose whole fruit rather than fruit juice.  Eat a variety of colors.    Vegetables    These can be fresh, frozen, canned, or dried.  Beans, peas, and lentils count too.    Whole grains    Choose whole-grain breads, cereals, and noodles.  Try brown rice.    Lean proteins    These can include lean meat, poultry, fish, and eggs.  You can also have tofu, beans, peas, lentils, nuts, and seeds.    Dairy    Try milk, yogurt, and cheese.  Choose low-fat or fat-free when you can.  If you need to, use lactose-free milk or fortified plant-based milk products, such as soy milk.    Water    Drink water when you're thirsty.  Limit sugar-sweetened drinks, including soda, fruit drinks, and sports drinks.  Where can you learn more?  Go to https://www.[employer].net/patiented  Enter T756 in the search box to learn more about \"Eating Healthy Foods: Care Instructions.\"  Current as of: September 20, 2023               Content Version: 14.0    0061-8701 [employer].   Care instructions adapted under license by your healthcare professional. If you have questions about a medical condition or this instruction, always ask your healthcare professional. [employer] disclaims any warranty or liability for your use of this information.      Body Mass Index: Care Instructions  Overview     Body mass index (BMI) can help you see if your weight is raising your risk for health problems. It uses a formula to compare how much you weigh with how " tall you are.  A BMI lower than 18.5 is considered underweight.  A BMI between 18.5 and 24.9 is considered healthy.  A BMI between 25 and 29.9 is considered overweight. A BMI of 30 or higher is considered obese.  If your BMI is in the normal range, it means that you have a lower risk for weight-related health problems. If your BMI is in the overweight or obese range, you may be at increased risk for weight-related health problems, such as high blood pressure, heart disease, stroke, arthritis or joint pain, and diabetes. If your BMI is in the underweight range, you may be at increased risk for health problems such as fatigue, lower protection (immunity) against illness, muscle loss, bone loss, hair loss, and hormone problems.  BMI is just one measure of your risk for weight-related health problems. You may be at higher risk for health problems if you are not active, you eat an unhealthy diet, or you drink too much alcohol or use tobacco products.  Follow-up care is a key part of your treatment and safety. Be sure to make and go to all appointments, and call your doctor if you are having problems. It's also a good idea to know your test results and keep a list of the medicines you take.  How can you care for yourself at home?  Practice healthy eating habits. This includes eating plenty of fruits, vegetables, whole grains, lean protein, and low-fat dairy.  If your doctor recommends it, get more exercise. Walking is a good choice. Bit by bit, increase the amount you walk every day. Try for at least 30 minutes on most days of the week.  Do not smoke. Smoking can increase your risk for health problems. If you need help quitting, talk to your doctor about stop-smoking programs and medicines. These can increase your chances of quitting for good.  Limit alcohol to 2 drinks a day for men and 1 drink a day for women. Too much alcohol can cause health problems.  If you have a BMI higher than 25  Your doctor may do other tests  "to check your risk for weight-related health problems. This may include measuring the distance around your waist. A waist measurement of more than 40 inches in men or 35 inches in women can increase the risk of weight-related health problems.  Talk with your doctor about steps you can take to stay healthy or improve your health. You may need to make lifestyle changes to lose weight and stay healthy, such as changing your diet and getting regular exercise.  If you have a BMI lower than 18.5  Your doctor may do other tests to check your risk for health problems.  Talk with your doctor about steps you can take to stay healthy or improve your health. You may need to make lifestyle changes to gain or maintain weight and stay healthy, such as getting more healthy foods in your diet and doing exercises to build muscle.  Where can you learn more?  Go to https://www.[employer].net/patiented  Enter S176 in the search box to learn more about \"Body Mass Index: Care Instructions.\"  Current as of: May 13, 2023               Content Version: 14.0    [id number] [employer].   Care instructions adapted under license by your healthcare professional. If you have questions about a medical condition or this instruction, always ask your healthcare professional. [employer] disclaims any warranty or liability for your use of this information.         "

## 2024-07-31 LAB
BKR LAB AP GYN ADEQUACY: NORMAL
BKR LAB AP GYN INTERPRETATION: NORMAL
BKR LAB AP LMP: NORMAL
BKR LAB AP PREVIOUS ABNORMAL: NORMAL
PATH REPORT.COMMENTS IMP SPEC: NORMAL
PATH REPORT.COMMENTS IMP SPEC: NORMAL
PATH REPORT.RELEVANT HX SPEC: NORMAL

## 2024-08-01 LAB
HPV HR 12 DNA CVX QL NAA+PROBE: NEGATIVE
HPV16 DNA CVX QL NAA+PROBE: NEGATIVE
HPV18 DNA CVX QL NAA+PROBE: NEGATIVE
HUMAN PAPILLOMA VIRUS FINAL DIAGNOSIS: NORMAL

## 2024-09-05 ENCOUNTER — VIRTUAL VISIT (OUTPATIENT)
Dept: PSYCHOLOGY | Facility: CLINIC | Age: 49
End: 2024-09-05
Payer: COMMERCIAL

## 2024-09-05 DIAGNOSIS — F43.11 ACUTE POSTTRAUMATIC STRESS DISORDER: Primary | ICD-10-CM

## 2024-09-05 PROCEDURE — 90834 PSYTX W PT 45 MINUTES: CPT | Mod: 95 | Performed by: SOCIAL WORKER

## 2024-09-05 NOTE — PROGRESS NOTES
[facility]                                     Progress Note    Patient Name: Michelle Jalloh  Date: 9/5/2024       Service Type: Individual      Session Start Time: 8:33 AM Session End Time: 9:22 AM     Session Length: 38-52 minutes    Session #: 12    Attendees: Client attended alone    Service Modality:  Telephone due to issues with video      Provider verified identity through the following two step process.  Patient provided:  Patient is known previously to provider    Telemedicine Visit: The patient's condition can be safely assessed and treated via synchronous audio and visual telemedicine encounter.      Reason for Telemedicine Visit: Services only offered telehealth    Originating Site (Patient Location): Patient's home    Distant Site (Provider Location): Provider's Remote Location    Consent:  The patient/guardian has verbally consented to: the potential risks and benefits of telemedicine (video visit) versus in person care; bill my insurance or make self-payment for services provided; and responsibility for payment of non-covered services.     Patient would like the video invitation sent by:  My Chart    Mode of Communication:  Telephone    Distant Location (Provider):  Off-site    As the provider I attest to compliance with applicable laws and regulations related to telemedicine.    DATA  Interactive Complexity: No  Crisis: No        Progress Since Last Session (Related to Symptoms / Goals / Homework):   Symptoms:  improving      Homework: Achieved / completed to satisfaction     Episode of Care Goals: Satisfactory progress - ACTION (Actively working towards change); Intervened by reinforcing change plan / affirming steps taken     Current / Ongoing Stressors and Concerns:   Dynamics in relationship with sister   Father's health     Treatment Objective(s) Addressed in This Session:   use assertive communication skills    Setting healthy boundaries and reinforcing  boundaries  Identify trauma symptoms  EMDR: phase 1 & 2     Intervention:   Engaged in active listening   EMDR: discussed next steps, discussed using early trauma protocol    Assessments completed prior to visit:  The following assessments were completed by patient for this visit:  None     ASSESSMENT: Current Emotional / Mental Status (status of significant symptoms):   Risk status (Self / Other harm or suicidal ideation)   Patient denies current fears or concerns for personal safety.   Patient denies current or recent suicidal ideation or behaviors.   Patient denies current or recent homicidal ideation or behaviors.   Patient denies current or recent self injurious behavior or ideation.   Patient denies other safety concerns.   Patient reports there has been no change in risk factors since their last session.     Patient reports there has been no change in protective factors since their last session.     Recommended that patient call 911 or go to the local ED should there be a change in any of these risk factors.     Appearance:   Unable to assess via telephone    Eye Contact:   Unable to assess via telephone    Psychomotor Behavior: Unable to assess via telephone    Attitude:   Cooperative  Interested   Orientation:   All   Speech    Rate / Production: Talkative    Volume:  Normal    Mood:    Anxious Sad   Affect:    Unable to assess via telephone  Tearful   Thought Content:  Clear    Thought Form:  Coherent  Goal Directed  Logical    Insight:    Good      Medication Review:   No current psychiatric medications prescribed     Medication Compliance:   NA     Changes in Health Issues:   None reported     Chemical Use Review:  Substance Use: No Use     Tobacco Use: None    Diagnosis:  Other Specified Trauma and Stressor Related Disorder   Major Depressive Disorder, Mild, Recurrent, in partial remission    Collateral Reports Completed:   Not Applicable    PLAN: (Patient Tasks / Therapist Tasks / Other)  EMDR: client  voiced agreement, therapist to engage in history taking, completion of NEW II, engaging client in container and safe/calm state exercises  Therapist will engage client in the early trauma protocol.    Jessie Rivas, [facility] 9/5/2024    ______________________________________________________________________    Individual Treatment Plan    Patient's Name: Michelle Jalloh  YOB: 1975    Date of Creation: 6/29/2023  Date Treatment Plan Last Reviewed/Revised: 7/18/2024    DSM5 Diagnoses:Other Specified Trauma and Stressor Related Disorder   Major Depressive Disorder, Mild, Recurrent, in partial remission  Psychosocial / Contextual Factors: lives with son and , father and sister live in [place]  PROMIS (reviewed every 90 days):     PROMIS 10-Global Health (only subscores and total score):       5/8/2023    11:38 PM 8/31/2023     4:21 PM 12/27/2023    10:25 AM 5/9/2024     8:06 AM 9/3/2024     1:39 PM   PROMIS-10 Scores Only   Global Mental Health Score 12 17 15 15 16   Global Physical Health Score 16 17 18 16 18   PROMIS TOTAL - SUBSCORES 28 34 33 31 34       Referral / Collaboration:  Referral to another professional/service is not indicated at this time..    Anticipated number of session for this episode of care:  will review in 90 days  Anticipation frequency of session:  every 2-3 weeks  Anticipated Duration of each session: 38-52 minutes  Treatment plan will be reviewed in 90 days or when goals have been changed.       MeasurableTreatment Goal(s) related to diagnosis / functional impairment(s)  Goal 1: Patient will further develop skills for interpersonal relationships.    Objective #A (Patient Action)    Patient will  learn and implement healthy boundaries .   Status: New - Date: 6/29/2023  , continued date: 11/3/2023, continued date: 2/28/2024, continued date: 7/18/2024    Intervention(s)  Therapist will teach  strategies on establishing healthy boundaries.  Therapist will engage client in  identifying areas to establish boundaries .    Objective #B  Patient will  learn and implement assertive communication skills .   Status: New - Date: 6/29/2023  , continued date: 11/3/2023, continued date: 2/28/2024, continued date: 7/18/2024    Intervention(s)  Therapist will  teach and model use of assertive communication skills .    Objective #C  Patient will use cognitive strategies identified in therapy to challenge anxious thoughts that can be barrier to communication.  Status: New - Date: 6/29/2023  , continued date: 11/3/2023, continued date: 2/28/2024, continued date: 7/18/2024    Intervention(s)  Therapist will  teach the CBT model, including cognitive distortions and cognitive restructuring techniques .    Goal 2: Patient will be able to recall the traumatic events without becoming overwhelmed with negative thoughts, feelings, or urges.    Objective #A (Patient Action)    Status: New - Date: 2/28/2024     Patient will describe the history of and nature of trauma symptoms    Intervention(s)  Therapist will assess the client's frequency, intensity, duration, and history of trauma symptoms and their impact on functioning.    Objective #B (Patient Action)  Status: New Date: 2/28/2024, continued date: 7/18/2024    Patient will cooperate with eye movement desensitization and reprocessing (EMDR) to reduce emotional reaction to traumatic event(s)    Intervention(s)  Therapist will utilize EMDR to reduce the client's emotional reactivity to the traumatic event(s).    Objective #C (Patient Action)  Status: New Date: 2/28/2024, continued date: 7/18/2024    Patient will learn and implement calming and coping strategies to manage trauma symptoms.    Intervention(s)  Therapist will teach grounding techniques, distress tolerance, and emotion regulation techniques.     Patient has reviewed and agreed to the above plan.      KATARZYNA Fairbanks  June 29, 2023  KATARZYNA Fairbanks  11/3/2023   Jesise Rivas,  [facility]  2/28/2024  Jessie Rivas, [facility]  7/18/2024

## 2024-09-10 NOTE — PROGRESS NOTES
09/10/24 2:03 PM  PATIENT LAB/IMAGING STATUS : MyChart sent to patient to complete standing/future orders

## 2024-09-17 ENCOUNTER — VIRTUAL VISIT (OUTPATIENT)
Dept: PSYCHOLOGY | Facility: CLINIC | Age: 49
End: 2024-09-17
Payer: COMMERCIAL

## 2024-09-17 DIAGNOSIS — F33.41 RECURRENT MAJOR DEPRESSIVE DISORDER, IN PARTIAL REMISSION (H): ICD-10-CM

## 2024-09-17 DIAGNOSIS — F43.11 ACUTE POSTTRAUMATIC STRESS DISORDER: Primary | ICD-10-CM

## 2024-09-17 PROCEDURE — 90834 PSYTX W PT 45 MINUTES: CPT | Mod: 95 | Performed by: SOCIAL WORKER

## 2024-09-17 NOTE — PROGRESS NOTES
[facility]                                     Progress Note    Patient Name: Michelle Jalloh  Date: 9/17/2024       Service Type: Individual      Session Start Time: 8:02 AM Session End Time: 8:47 AM     Session Length: 38-52 minutes    Session #: 13    Attendees: Client attended alone    Service Modality:  video      Provider verified identity through the following two step process.  Patient provided:  Patient is known previously to provider    Telemedicine Visit: The patient's condition can be safely assessed and treated via synchronous audio and visual telemedicine encounter.      Reason for Telemedicine Visit: Services only offered telehealth    Originating Site (Patient Location): Patient's home    Distant Site (Provider Location): Provider's Remote Location    Consent:  The patient/guardian has verbally consented to: the potential risks and benefits of telemedicine (video visit) versus in person care; bill my insurance or make self-payment for services provided; and responsibility for payment of non-covered services.     Patient would like the video invitation sent by:  My Chart    Mode of Communication:  Amwell    Distant Location (Provider):  Off-site    As the provider I attest to compliance with applicable laws and regulations related to telemedicine.    DATA  Interactive Complexity: No  Crisis: No        Progress Since Last Session (Related to Symptoms / Goals / Homework):   Symptoms:  no change since previous appointment      Homework: Achieved / completed to satisfaction     Episode of Care Goals: Satisfactory progress - ACTION (Actively working towards change); Intervened by reinforcing change plan / affirming steps taken     Current / Ongoing Stressors and Concerns:   Dynamics in relationship with sister   Father's health     Treatment Objective(s) Addressed in This Session:   use assertive communication skills    Setting healthy boundaries and reinforcing boundaries  Identify  trauma symptoms  EMDR: phase 1 & 2     Intervention:   Supported client with interpersonal relationships; reinforcing boundary setting and assertive communication   EMDR: engaged client in completion of NEW II (scored: 5.71), increased awareness of window of tolerance, engaged in deep breathing exercise, identified potential targets    Assessments completed prior to visit:  The following assessments were completed by patient for this visit:  None     ASSESSMENT: Current Emotional / Mental Status (status of significant symptoms):   Risk status (Self / Other harm or suicidal ideation)   Patient denies current fears or concerns for personal safety.   Patient denies current or recent suicidal ideation or behaviors.   Patient denies current or recent homicidal ideation or behaviors.   Patient denies current or recent self injurious behavior or ideation.   Patient denies other safety concerns.   Patient reports there has been no change in risk factors since their last session.     Patient reports there has been no change in protective factors since their last session.     Recommended that patient call 911 or go to the local ED should there be a change in any of these risk factors.     Appearance:   Appropriate    Eye Contact:   Good    Psychomotor Behavior: Normal    Attitude:   Cooperative  Interested Pleasant   Orientation:   All   Speech    Rate / Production: Talkative Normal     Volume:  Normal    Mood:    Anxious Sad   Affect:    Tearful Mood Congruent     Thought Content:  Clear    Thought Form:  Coherent  Goal Directed  Logical    Insight:    Good      Medication Review:   No current psychiatric medications prescribed     Medication Compliance:   NA     Changes in Health Issues:   None reported     Chemical Use Review:  Substance Use: No Use     Tobacco Use: None    Diagnosis:  Other Specified Trauma and Stressor Related Disorder   Major Depressive Disorder, Mild, Recurrent, in partial remission    Collateral  Reports Completed:   Not Applicable    PLAN: (Patient Tasks / Therapist Tasks / Other)  EMDR:  therapist to engage in history taking, engaging client in container and safe/calm state exercises  Therapist will engage client in the early trauma protocol.  Therapist encouraged increased awareness of window of tolerance    Jessie Rivas, [facility] 9/17/2024    ______________________________________________________________________    Individual Treatment Plan    Patient's Name: Michelle Jalloh  YOB: 1975    Date of Creation: 6/29/2023  Date Treatment Plan Last Reviewed/Revised: 7/18/2024    DSM5 Diagnoses:Other Specified Trauma and Stressor Related Disorder   Major Depressive Disorder, Mild, Recurrent, in partial remission  Psychosocial / Contextual Factors: lives with son and , father and sister live in [place]  PROMIS (reviewed every 90 days):     PROMIS 10-Global Health (only subscores and total score):       5/8/2023    11:38 PM 8/31/2023     4:21 PM 12/27/2023    10:25 AM 5/9/2024     8:06 AM 9/3/2024     1:39 PM 9/15/2024     8:13 AM   PROMIS-10 Scores Only   Global Mental Health Score 12 17 15 15 16 14   Global Physical Health Score 16 17 18 16 18 18   PROMIS TOTAL - SUBSCORES 28 34 33 31 34 32       Referral / Collaboration:  Referral to another professional/service is not indicated at this time..    Anticipated number of session for this episode of care:  will review in 90 days  Anticipation frequency of session:  every 2-3 weeks  Anticipated Duration of each session: 38-52 minutes  Treatment plan will be reviewed in 90 days or when goals have been changed.       MeasurableTreatment Goal(s) related to diagnosis / functional impairment(s)  Goal 1: Patient will further develop skills for interpersonal relationships.    Objective #A (Patient Action)    Patient will  learn and implement healthy boundaries .   Status: New - Date: 6/29/2023  , continued date: 11/3/2023, continued date: 2/28/2024,  continued date: 7/18/2024    Intervention(s)  Therapist will teach  strategies on establishing healthy boundaries.  Therapist will engage client in identifying areas to establish boundaries .    Objective #B  Patient will  learn and implement assertive communication skills .   Status: New - Date: 6/29/2023  , continued date: 11/3/2023, continued date: 2/28/2024, continued date: 7/18/2024    Intervention(s)  Therapist will  teach and model use of assertive communication skills .    Objective #C  Patient will use cognitive strategies identified in therapy to challenge anxious thoughts that can be barrier to communication.  Status: New - Date: 6/29/2023  , continued date: 11/3/2023, continued date: 2/28/2024, continued date: 7/18/2024    Intervention(s)  Therapist will  teach the CBT model, including cognitive distortions and cognitive restructuring techniques .    Goal 2: Patient will be able to recall the traumatic events without becoming overwhelmed with negative thoughts, feelings, or urges.    Objective #A (Patient Action)    Status: New - Date: 2/28/2024     Patient will describe the history of and nature of trauma symptoms    Intervention(s)  Therapist will assess the client's frequency, intensity, duration, and history of trauma symptoms and their impact on functioning.    Objective #B (Patient Action)  Status: New Date: 2/28/2024, continued date: 7/18/2024    Patient will cooperate with eye movement desensitization and reprocessing (EMDR) to reduce emotional reaction to traumatic event(s)    Intervention(s)  Therapist will utilize EMDR to reduce the client's emotional reactivity to the traumatic event(s).    Objective #C (Patient Action)  Status: New Date: 2/28/2024, continued date: 7/18/2024    Patient will learn and implement calming and coping strategies to manage trauma symptoms.    Intervention(s)  Therapist will teach grounding techniques, distress tolerance, and emotion regulation techniques.      Patient has reviewed and agreed to the above plan.      Jessie Rivas, [facility]  June 29, 2023  Jessie Rivas, [facility]  11/3/2023   [facility]  2/28/2024  [facility]  7/18/2024

## 2024-09-25 ENCOUNTER — LAB (OUTPATIENT)
Dept: LAB | Facility: CLINIC | Age: 49
End: 2024-09-25
Payer: COMMERCIAL

## 2024-09-25 DIAGNOSIS — C73 PAPILLARY THYROID CARCINOMA (H): ICD-10-CM

## 2024-09-25 DIAGNOSIS — E89.0 HISTORY OF PARTIAL THYROIDECTOMY: ICD-10-CM

## 2024-09-25 DIAGNOSIS — R59.0 CERVICAL ADENOPATHY: ICD-10-CM

## 2024-09-25 PROCEDURE — 36415 COLL VENOUS BLD VENIPUNCTURE: CPT

## 2024-09-25 PROCEDURE — 99000 SPECIMEN HANDLING OFFICE-LAB: CPT

## 2024-09-25 PROCEDURE — 84439 ASSAY OF FREE THYROXINE: CPT

## 2024-09-25 PROCEDURE — 86800 THYROGLOBULIN ANTIBODY: CPT | Mod: 90

## 2024-09-25 PROCEDURE — 84443 ASSAY THYROID STIM HORMONE: CPT

## 2024-09-25 PROCEDURE — 84432 ASSAY OF THYROGLOBULIN: CPT | Mod: 90

## 2024-09-26 LAB
T4 FREE SERPL-MCNC: 1.26 NG/DL (ref 0.9–1.7)
TSH SERPL DL<=0.005 MIU/L-ACNC: 1.2 UIU/ML (ref 0.3–4.2)

## 2024-10-02 LAB — SCANNED LAB RESULT: NORMAL

## 2024-10-03 ENCOUNTER — VIRTUAL VISIT (OUTPATIENT)
Dept: PSYCHOLOGY | Facility: CLINIC | Age: 49
End: 2024-10-03
Payer: COMMERCIAL

## 2024-10-03 DIAGNOSIS — F43.11 ACUTE POSTTRAUMATIC STRESS DISORDER: Primary | ICD-10-CM

## 2024-10-03 PROCEDURE — 90834 PSYTX W PT 45 MINUTES: CPT | Mod: 95 | Performed by: SOCIAL WORKER

## 2024-10-03 NOTE — PROGRESS NOTES
[facility]                                     Progress Note    Patient Name: Michelle Jalloh  Date: 10/3/2024       Service Type: Individual      Session Start Time: 8:31 AM Session End Time: 9:20 AM     Session Length: 38-52 minutes    Session #: 14    Attendees: Client attended alone    Service Modality:  video      Provider verified identity through the following two step process.  Patient provided:  Patient is known previously to provider    Telemedicine Visit: The patient's condition can be safely assessed and treated via synchronous audio and visual telemedicine encounter.      Reason for Telemedicine Visit: Services only offered telehealth    Originating Site (Patient Location): Patient's home    Distant Site (Provider Location): Provider's Remote Location    Consent:  The patient/guardian has verbally consented to: the potential risks and benefits of telemedicine (video visit) versus in person care; bill my insurance or make self-payment for services provided; and responsibility for payment of non-covered services.     Patient would like the video invitation sent by:  My Chart    Mode of Communication:  Amwell    Distant Location (Provider):  Off-site    As the provider I attest to compliance with applicable laws and regulations related to telemedicine.    DATA  Interactive Complexity: No  Crisis: No        Progress Since Last Session (Related to Symptoms / Goals / Homework):   Symptoms:  continued symptoms      Homework: Achieved / completed to satisfaction     Episode of Care Goals: Satisfactory progress - ACTION (Actively working towards change); Intervened by reinforcing change plan / affirming steps taken     Current / Ongoing Stressors and Concerns:   Dynamics in relationship with sister   Father's health     Treatment Objective(s) Addressed in This Session:   use assertive communication skills    Setting healthy boundaries and reinforcing boundaries  Identify trauma  symptoms  EMDR: phase 1 & 2     Intervention:   Engaged in active listening   EMDR: provided psychoeducation, addressed questions, identified additional targets, discussed next steps   CBT: reinforced behavior changes    Assessments completed prior to visit:  The following assessments were completed by patient for this visit:  None     ASSESSMENT: Current Emotional / Mental Status (status of significant symptoms):   Risk status (Self / Other harm or suicidal ideation)   Patient denies current fears or concerns for personal safety.   Patient denies current or recent suicidal ideation or behaviors.   Patient denies current or recent homicidal ideation or behaviors.   Patient denies current or recent self injurious behavior or ideation.   Patient denies other safety concerns.   Patient reports there has been no change in risk factors since their last session.     Patient reports there has been no change in protective factors since their last session.     Recommended that patient call 911 or go to the local ED should there be a change in any of these risk factors.     Appearance:   Appropriate    Eye Contact:   Good    Psychomotor Behavior: Normal    Attitude:   Cooperative  Interested Friendly   Orientation:   All   Speech    Rate / Production: Talkative    Volume:  Normal    Mood:    Anxious    Affect:    Mood Congruent     Thought Content:  Clear    Thought Form:  Coherent  Goal Directed  Logical    Insight:    Good      Medication Review:   No current psychiatric medications prescribed     Medication Compliance:   NA     Changes in Health Issues:   None reported     Chemical Use Review:  Substance Use: No Use     Tobacco Use: None    Diagnosis:  Other Specified Trauma and Stressor Related Disorder   Major Depressive Disorder, Mild, Recurrent, in partial remission    Collateral Reports Completed:   Not Applicable    PLAN: (Patient Tasks / Therapist Tasks / Other)  EMDR:  therapist to engage in history taking,  engaging client in container and safe/calm state exercises  Therapist will engage client in the early trauma protocol.  Client will continue to use assertive communication skills and establish/reinforce boundaries    Jessie Rivas, [facility] 10/3/2024    ______________________________________________________________________    Individual Treatment Plan    Patient's Name: Michelle Jalloh  YOB: 1975    Date of Creation: 6/29/2023  Date Treatment Plan Last Reviewed/Revised: 7/18/2024    DSM5 Diagnoses:Other Specified Trauma and Stressor Related Disorder   Major Depressive Disorder, Mild, Recurrent, in partial remission  Psychosocial / Contextual Factors: lives with son and , father and sister live in [place]  PROMIS (reviewed every 90 days):     PROMIS 10-Global Health (only subscores and total score):       5/8/2023    11:38 PM 8/31/2023     4:21 PM 12/27/2023    10:25 AM 5/9/2024     8:06 AM 9/3/2024     1:39 PM 9/15/2024     8:13 AM 10/2/2024     1:17 PM   PROMIS-10 Scores Only   Global Mental Health Score 12 17 15 15 16 14 15   Global Physical Health Score 16 17 18 16 18 18 18   PROMIS TOTAL - SUBSCORES 28 34 33 31 34 32 33       Referral / Collaboration:  Referral to another professional/service is not indicated at this time..    Anticipated number of session for this episode of care:  will review in 90 days  Anticipation frequency of session:  every 2-3 weeks  Anticipated Duration of each session: 38-52 minutes  Treatment plan will be reviewed in 90 days or when goals have been changed.       MeasurableTreatment Goal(s) related to diagnosis / functional impairment(s)  Goal 1: Patient will further develop skills for interpersonal relationships.    Objective #A (Patient Action)    Patient will  learn and implement healthy boundaries .   Status: New - Date: 6/29/2023  , continued date: 11/3/2023, continued date: 2/28/2024, continued date: 7/18/2024    Intervention(s)  Therapist will teach   strategies on establishing healthy boundaries.  Therapist will engage client in identifying areas to establish boundaries .    Objective #B  Patient will  learn and implement assertive communication skills .   Status: New - Date: 6/29/2023  , continued date: 11/3/2023, continued date: 2/28/2024, continued date: 7/18/2024    Intervention(s)  Therapist will  teach and model use of assertive communication skills .    Objective #C  Patient will use cognitive strategies identified in therapy to challenge anxious thoughts that can be barrier to communication.  Status: New - Date: 6/29/2023  , continued date: 11/3/2023, continued date: 2/28/2024, continued date: 7/18/2024    Intervention(s)  Therapist will  teach the CBT model, including cognitive distortions and cognitive restructuring techniques .    Goal 2: Patient will be able to recall the traumatic events without becoming overwhelmed with negative thoughts, feelings, or urges.    Objective #A (Patient Action)    Status: New - Date: 2/28/2024     Patient will describe the history of and nature of trauma symptoms    Intervention(s)  Therapist will assess the client's frequency, intensity, duration, and history of trauma symptoms and their impact on functioning.    Objective #B (Patient Action)  Status: New Date: 2/28/2024, continued date: 7/18/2024    Patient will cooperate with eye movement desensitization and reprocessing (EMDR) to reduce emotional reaction to traumatic event(s)    Intervention(s)  Therapist will utilize EMDR to reduce the client's emotional reactivity to the traumatic event(s).    Objective #C (Patient Action)  Status: New Date: 2/28/2024, continued date: 7/18/2024    Patient will learn and implement calming and coping strategies to manage trauma symptoms.    Intervention(s)  Therapist will teach grounding techniques, distress tolerance, and emotion regulation techniques.     Patient has reviewed and agreed to the above plan.      Jessie Rivas  [facility]  June 29, 2023  Jessie Rivas, [facility]  11/3/2023   Jessie Rivas, [facility]  2/28/2024  Jessie Rivas, [facility]  7/18/2024

## 2024-10-08 ENCOUNTER — TELEPHONE (OUTPATIENT)
Dept: ENDOCRINOLOGY | Facility: CLINIC | Age: 49
End: 2024-10-08

## 2024-10-08 ENCOUNTER — VIRTUAL VISIT (OUTPATIENT)
Dept: ENDOCRINOLOGY | Facility: CLINIC | Age: 49
End: 2024-10-08
Payer: COMMERCIAL

## 2024-10-08 DIAGNOSIS — C73 PAPILLARY THYROID CARCINOMA (H): Primary | ICD-10-CM

## 2024-10-08 DIAGNOSIS — E89.0 HISTORY OF PARTIAL THYROIDECTOMY: ICD-10-CM

## 2024-10-08 PROCEDURE — 99214 OFFICE O/P EST MOD 30 MIN: CPT | Mod: 95

## 2024-10-08 PROCEDURE — G2211 COMPLEX E/M VISIT ADD ON: HCPCS | Mod: 95

## 2024-10-08 RX ORDER — PROGESTERONE 100 MG/1
100 CAPSULE ORAL AT BEDTIME
COMMUNITY

## 2024-10-08 RX ORDER — ESTRADIOL 0.03 MG/D
1 FILM, EXTENDED RELEASE TRANSDERMAL
COMMUNITY
Start: 2024-06-13

## 2024-10-08 ASSESSMENT — PAIN SCALES - GENERAL: PAINLEVEL: NO PAIN (0)

## 2024-10-08 NOTE — TELEPHONE ENCOUNTER
Left Voicemail (1st Attempt) and Sent Mychart (1st Attempt) for the patient to call back and schedule the following:    Appointment type: US  Provider: Kajal  Return date: asap  Specialty phone number: [phone]  Additional appointment(s) needed:   Additonal Notes:   ISABEL, Vivian Bocanegra, RN  [facility]  Hello!    Could you see if patient can get scheduled for a neck US soon? Dr. Rdz is seeing patient today but looks like she never got her annual follow up US.    Thank you!  Vivian    Patient was contacted on 10/30/23 to schedule US prior to follow up with Dr. Rdz. Patient stated she would call imaging on her own and was provided the imaging number. Patient never scheduled US.    Nya Tejada on 10/8/2024 at 1:43 PM

## 2024-10-08 NOTE — LETTER
10/8/2024       RE: Michelle Jalloh  [address] [address]     Dear Colleague,    Thank you for referring your patient, Michelel Jalloh, to the [facility] [place] at [facility]. Please see a copy of my visit note below.    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  :She is not on LT4.    Previsit US was not performed as expecteed, reordered.   Try again to schedule the US   See me in 2 years assuming the US doesn't raise new concerns.  Labs prior to appt: Tg, TSH, free T4     Partial thyroidectomy right lobectomy and isthmusectomy    Left level 2 LN questions raised by radiologist last year - as above      The Longitudinal plan of care for postsurgical hypothyroidism related to thyroid cancer/thyroid cancer surveillance/treatment was addressed during this visit. Due to added complexity of care, we will continue to support Michelle , and the subsequent management of this condition(s) and with the ongoing continuity of care of this condition.    Due to the continued risks of COVID 19 transmission and to improve ease of access this visit was a telephone/video visit. The patient gave verbal consent for the visit today.    I have independently reviewed and interpreted labs, imaging as indicated.     Distant Location (provider location):  Off-site  Mode of Communication:  Video Conference via [employer]  Chart review/prep time 1  8701-7816; [id number];   Visit Start time  1501   Visit Stop time 1508  20__ minutes spent on the date of the encounter doing chart review, history and exam, documentation and further activities as noted above.    Latisha Rdz MD    Chief complaint:  HISTORY OF PRESENT  ÓSCAR Martinez returns for follow up of papillary thyroid microcarcinoma, history of partial thyroidectomy .  I last saw her 10/9/23.  She already had labs in anticipation of this appt.  She was also supposed to have previsit US but it was not scheduled and it has not been done .  Today she is apologizing that she didn't get this done, it fell off her radar screen. She has a number of questions as noted in the ROS    Thyroid cancer treatment has been as follows:   6/27/17 right thyroid lobectomy and isthmusectomy: papillary microcarcinoma 0.4 cm  She is not on thyroid hormone.      Endocrine relevant labs are as follows:  12/20/16 Tg 13.3, calcitonin 3.3  8/25/17 Tg 12.4 ([facility]), DEZ <0.4 ,TPO < 10, TSH 1.7, free T4 1.01, free T3 2.7, TPO < 10  10/15/22 Tg 11.8 (concurrent 10.1), DEZ < 0.4, TSH 1.45  11/2/23 Tg 12.5 (concurrent 16.5), DEZ < 0.4, tSH 2.13, free T4 1.46  9/25/24 Tg 14.6 (concurrent 13.3), DEZ < 0.4, TSH 1.2, free T4 1.26    Relevant imaging is as follows: (as read by me as it pertains to endocrine relevant organs)  8/18/16 thyroid US: right isthmus nodule 0.4 x 0.5 x 0.5 suspicious   12/30/16 neck US   12/14/2021 CT neck with contrast  2/17/22 CT neck with contrast:   Right thryroid absent  Left thyroid present  9/28/23 neck US compared with 10/17/22   Right thyroid absent;   Left nodule 0.6 x 0.4 x 0.7 cyst ;  0.5 x 0.4 x 0.7 cm   Left nodule # 2 0.6 x 0.3 x   grade 4 doppler ; was left # 3 0.5 x 0.3 x   Right thyroid bed mass level 6 # 1  0.4 x 0.3 x 0.4 cm was 0.4  X0.3 x 0.4 cm   Right level 3 # 1 not seen was 1.1 x 0.4 x 1.9 cm   Right level 5 # 1 0.8x 0.3 x 2.1 cm was 0.8 x 0.5 x 1.4 cm   Left neck  level 3 LN # 1 1.1 x 0.3 x 1.4 cm   Left level 4 # 1 0.9 x 0.5 x 0.8 was 0.9 x 0.5 x 1.0 cm   Left level 4 # 2  not seen was 0.7 x 0.6 x 1.2 - abnormal ? To my eye - radiologist calling it normal     REVIEW OF SYSTEMS  Often feels throat   Talk all day as a teacher   Don't drink enough water  What  should I be watching for?     Past Medical History  Past Medical History:   Diagnosis Date     Acne vulgaris 2007     Adjustment disorder with depressed mood      Discoid lupus erythematosus 2012     History of partial thyroidectomy      Melasma 2007     Motion sickness      Papillary microcarcinoma of thyroid (H)      Past Surgical History:   Procedure Laterality Date     APPENDECTOMY  18      SECTION  2014     COLONOSCOPY N/A 2022    Procedure: COLONOSCOPY;  Surgeon: Leventhal, Thomas Michael, MD;  Location: [facility] OR     THYROIDECTOMY Right 2017    Procedure: THYROIDECTOMY;  Right Thyroid Lobectomy, Isthmysectomy;  Surgeon: Marisa Enamorado MD;  [facility]     Medications  Current Outpatient Medications   Medication Sig Dispense Refill     MULTIPLE VITAMIN PO Indications: Pt notes maybe [place] brand - PRN       Probiotic Product (PROBIOTIC DAILY PO) Take 1 capsule by mouth daily         Allergies  No Known Allergies    Family History  Family History   Problem Relation Age of Onset     Hyperthyroidism Mother         Thyroid     Depression Mother      Hypertension Father      Colon Cancer Father         age 86  and also has Lymphoma     Hypertension Paternal Grandmother      Diabetes Paternal Grandfather      Brain Cancer Maternal Uncle      Breast Cancer No family hx of      Thyroid Cancer No family hx of      Social History  Social History     Tobacco Use     Smoking status: Former     Types: Cigarettes     Start date: 1999     Passive exposure: Past     Smokeless tobacco: Never   Vaping Use     Vaping status: Never Used   Substance Use Topics     Alcohol use: Yes     Alcohol/week: 0.0 standard drinks of alcohol     Comment: 1/week if that.     Drug use: No     Talk all day as a teacher     Physical Exam  There is no height or weight on file to calculate BMI.   BP Readings from Last 1 Encounters:   24 122/79      Pulse Readings from Last 1 Encounters:   24  "56      Resp Readings from Last 1 Encounters:   07/26/24 16      Temp Readings from Last 1 Encounters:   07/26/24 97.6  F (36.4  C) (Temporal)      SpO2 Readings from Last 1 Encounters:   07/26/24 99%      Wt Readings from Last 1 Encounters:   07/26/24 75.1 kg (165 lb 8 oz)      Ht Readings from Last 1 Encounters:   07/26/24 1.626 m (5' 4\")     GENERAL: no distress; I can see from mid chest up; voice normal   SKIN: Visible skin clear. No significant rash, abnormal pigmentation or lesions.  EYES: Eyes grossly normal to inspection.   NECK: visible goiter is not present; no visible neck masses  RESP: No audible wheeze, cough, or increased work of breathing.    NEURO: Awake, alert, responds appropriately to questions.  Mentation and speech fluent.  PSYCH:affect normal and appearance well-groomed.    DATA REVIEW     Latest Ref Rng 11/2/2023  8:12 AM 9/25/2024  3:20 PM   ENDO THYROID LABS-[facility]      TSH 0.30 - 4.20 uIU/mL 2.13  1.20    Free T3 2.3 - 4.2 pg/mL     FREE T4 0.90 - 1.70 ng/dL 1.46  1.26            Virtual Visit Details    Type of service:  Video Visit       Again, thank you for allowing me to participate in the care of your patient.      Sincerely,    Latisha Rdz MD    "

## 2024-10-08 NOTE — PATIENT INSTRUCTIONS
See me in 2 years with  labs 2-3 weeks prior    Neck ultrasound at the [facility] on [facility] now ([phone] to schedule)

## 2024-10-08 NOTE — NURSING NOTE
Current patient location: Patient declined to provide     Is the patient currently in the state of [place]? YES    Visit mode:VIDEO    If the visit is dropped, the patient can be reconnected by: VIDEO VISIT: Text to cell phone:   Telephone Information:   Mobile [phone]    and VIDEO VISIT: Send to e-mail at: [email or web address]    Will anyone else be joining the visit? NO  (If patient encounters technical issues they should call [phone]    Are changes needed to the allergy or medication list? No    Are refills needed on medications prescribed by this physician? Discuss with provider    Rooming Documentation:  Not applicable    Reason for visit: RECHECK (RETURN THYROID CANCER )    Gabrielle CONDONF

## 2024-10-08 NOTE — PROGRESS NOTES
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  :She is not on LT4.    Previsit US was not performed as expecteed, reordered.   Try again to schedule the US   See me in 2 years assuming the US doesn't raise new concerns.  Labs prior to appt: Tg, TSH, free T4     Partial thyroidectomy right lobectomy and isthmusectomy    Left level 2 LN questions raised by radiologist last year - as above      The Longitudinal plan of care for postsurgical hypothyroidism related to thyroid cancer/thyroid cancer surveillance/treatment was addressed during this visit. Due to added complexity of care, we will continue to support Michelle , and the subsequent management of this condition(s) and with the ongoing continuity of care of this condition.    Due to the continued risks of COVID 19 transmission and to improve ease of access this visit was a telephone/video visit. The patient gave verbal consent for the visit today.    I have independently reviewed and interpreted labs, imaging as indicated.     Distant Location (provider location):  Off-site  Mode of Communication:  Video Conference via [email or web address]  Chart review/prep time 1  6899-1280; 4782-7547;   Visit Start time  1501   Visit Stop time 1508  20__ minutes spent on the date of the encounter doing chart review, history and exam, documentation and further activities as noted above.    Latisha Rdz MD    Chief complaint:  HISTORY OF PRESENT ILLNESS    Michelle returns for follow up of papillary thyroid microcarcinoma, history of partial thyroidectomy .  I last saw her 10/9/23.  She already had labs in anticipation of this appt.  She was also supposed to have previsit US but it was not scheduled and it has not been done .  Today she is apologizing that she didn't get this done, it fell off her radar screen. She has a number of questions as noted in the ROS    Thyroid cancer  treatment has been as follows:   17 right thyroid lobectomy and isthmusectomy: papillary microcarcinoma 0.4 cm  She is not on thyroid hormone.      Endocrine relevant labs are as follows:  16 Tg 13.3, calcitonin 3.3  17 Tg 12.4 ([facility]), DEZ <0.4 ,TPO < 10, TSH 1.7, free T4 1.01, free T3 2.7, TPO < 10  10/15/22 Tg 11.8 (concurrent 10.1), DEZ < 0.4, TSH 1.45  23 Tg 12.5 (concurrent 16.5), DEZ < 0.4, tSH 2.13, free T4 1.46  24 Tg 14.6 (concurrent 13.3), DEZ < 0.4, TSH 1.2, free T4 1.26    Relevant imaging is as follows: (as read by me as it pertains to endocrine relevant organs)  16 thyroid US: right isthmus nodule 0.4 x 0.5 x 0.5 suspicious   16 neck US   2021 CT neck with contrast  22 CT neck with contrast:   Right thryroid absent  Left thyroid present  23 neck US compared with 10/17/22   Right thyroid absent;   Left nodule 0.6 x 0.4 x 0.7 cyst ;  0.5 x 0.4 x 0.7 cm   Left nodule # 2 0.6 x 0.3 x   grade 4 doppler ; was left # 3 0.5 x 0.3 x   Right thyroid bed mass level 6 # 1  0.4 x 0.3 x 0.4 cm was 0.4  X0.3 x 0.4 cm   Right level 3 # 1 not seen was 1.1 x 0.4 x 1.9 cm   Right level 5 # 1 0.8x 0.3 x 2.1 cm was 0.8 x 0.5 x 1.4 cm   Left neck  level 3 LN # 1 1.1 x 0.3 x 1.4 cm   Left level 4 # 1 0.9 x 0.5 x 0.8 was 0.9 x 0.5 x 1.0 cm   Left level 4 # 2  not seen was 0.7 x 0.6 x 1.2 - abnormal ? To my eye - radiologist calling it normal     REVIEW OF SYSTEMS  Often feels throat   Talk all day as a teacher   Don't drink enough water  What should I be watching for?     Past Medical History  Past Medical History:   Diagnosis Date    Acne vulgaris 2007    Adjustment disorder with depressed mood     Discoid lupus erythematosus 2012    History of partial thyroidectomy     Melasma 2007    Motion sickness     Papillary microcarcinoma of thyroid (H) 2017     Past Surgical History:   Procedure Laterality Date    APPENDECTOMY  16     SECTION  2014    COLONOSCOPY N/A  "12/21/2022    Procedure: COLONOSCOPY;  Surgeon: Leventhal, Thomas Michael, MD;  Location: [facility] OR    THYROIDECTOMY Right 06/27/2017    Procedure: THYROIDECTOMY;  Right Thyroid Lobectomy, Isthmysectomy;  Surgeon: Marisa Enamorado MD;  Location:  OR     Medications  Current Outpatient Medications   Medication Sig Dispense Refill    MULTIPLE VITAMIN PO Indications: Pt notes maybe [place] brand - PRN      Probiotic Product (PROBIOTIC DAILY PO) Take 1 capsule by mouth daily         Allergies  No Known Allergies    Family History  Family History   Problem Relation Age of Onset    Hyperthyroidism Mother         Thyroid    Depression Mother     Hypertension Father     Colon Cancer Father         age 86  and also has Lymphoma    Hypertension Paternal Grandmother     Diabetes Paternal Grandfather     Brain Cancer Maternal Uncle     Breast Cancer No family hx of     Thyroid Cancer No family hx of      Social History  Social History     Tobacco Use    Smoking status: Former     Types: Cigarettes     Start date: 1/29/1999     Passive exposure: Past    Smokeless tobacco: Never   Vaping Use    Vaping status: Never Used   Substance Use Topics    Alcohol use: Yes     Alcohol/week: 0.0 standard drinks of alcohol     Comment: 1/week if that.    Drug use: No     Talk all day as a teacher     Physical Exam  There is no height or weight on file to calculate BMI.   BP Readings from Last 1 Encounters:   07/26/24 122/79      Pulse Readings from Last 1 Encounters:   07/26/24 56      Resp Readings from Last 1 Encounters:   07/26/24 16      Temp Readings from Last 1 Encounters:   07/26/24 97.6  F (36.4  C) (Temporal)      SpO2 Readings from Last 1 Encounters:   07/26/24 99%      Wt Readings from Last 1 Encounters:   07/26/24 75.1 kg (165 lb 8 oz)      Ht Readings from Last 1 Encounters:   07/26/24 1.626 m (5' 4\")     GENERAL: no distress; I can see from mid chest up; voice normal   SKIN: Visible skin clear. No significant rash, abnormal " pigmentation or lesions.  EYES: Eyes grossly normal to inspection.   NECK: visible goiter is not present; no visible neck masses  RESP: No audible wheeze, cough, or increased work of breathing.    NEURO: Awake, alert, responds appropriately to questions.  Mentation and speech fluent.  PSYCH:affect normal and appearance well-groomed.    DATA REVIEW     Latest Ref Rng 11/2/2023  8:12 AM 9/25/2024  3:20 PM   ENDO THYROID LABS-[facility]      TSH 0.30 - 4.20 uIU/mL 2.13  1.20    Free T3 2.3 - 4.2 pg/mL     FREE T4 0.90 - 1.70 ng/dL 1.46  1.26

## 2024-10-10 NOTE — TELEPHONE ENCOUNTER
Left Voicemail (2nd Attempt) for the patient to call back and schedule the following:    Appointment type: US  Provider: Kajal  Return date: next avail  Specialty phone number: [phone]  Additional appointment(s) needed:   Additonal Notes:   LVM x2      Vivian Bansal, RN  [facility]  Hello!    Could you see if patient can get scheduled for a neck US soon? Dr. Rdz is seeing patient today but looks like she never got her annual follow up US.    Thank you!  Vivian     Patient was contacted on 10/30/23 to schedule US prior to follow up with Dr. Rdz. Patient stated she would call imaging on her own and was provided the imaging number. Patient never scheduled US.    Nya Tejada on 10/10/2024 at 12:17 PM

## 2024-10-16 ENCOUNTER — VIRTUAL VISIT (OUTPATIENT)
Dept: PSYCHOLOGY | Facility: CLINIC | Age: 49
End: 2024-10-16
Payer: COMMERCIAL

## 2024-10-16 DIAGNOSIS — F43.11 ACUTE POSTTRAUMATIC STRESS DISORDER: Primary | ICD-10-CM

## 2024-10-16 PROCEDURE — 90834 PSYTX W PT 45 MINUTES: CPT | Mod: 95 | Performed by: SOCIAL WORKER

## 2024-10-16 PROCEDURE — 90785 PSYTX COMPLEX INTERACTIVE: CPT | Mod: 95 | Performed by: SOCIAL WORKER

## 2024-10-16 NOTE — PROGRESS NOTES
[facility]                                     Progress Note    Patient Name: Michelle Jalloh  Date: 10/16/2024       Service Type: Individual      Session Start Time: 8:32 AM Session End Time: 9:21 AM     Session Length: 38-52 minutes    Session #: 15    Attendees: Client attended alone    Service Modality:  video      Provider verified identity through the following two step process.  Patient provided:  Patient is known previously to provider    Telemedicine Visit: The patient's condition can be safely assessed and treated via synchronous audio and visual telemedicine encounter.      Reason for Telemedicine Visit: Services only offered telehealth    Originating Site (Patient Location): Patient's home    Distant Site (Provider Location): Provider's Remote Location    Consent:  The patient/guardian has verbally consented to: the potential risks and benefits of telemedicine (video visit) versus in person care; bill my insurance or make self-payment for services provided; and responsibility for payment of non-covered services.     Patient would like the video invitation sent by:  My Chart    [facility]    Distant Location (Provider):  Off-site    As the provider I attest to compliance with applicable laws and regulations related to telemedicine.    DATA  Interactive Complexity: Yes, visit entailed Interactive Complexity evidenced by:  Used [email or web address] to engage client in BLS for EMDR: phase 2  Crisis: No        Progress Since Last Session (Related to Symptoms / Goals / Homework):   Symptoms:  no change since previous appointment      Homework: Achieved / completed to satisfaction     Episode of Care Goals: Satisfactory progress - ACTION (Actively working towards change); Intervened by reinforcing change plan / affirming steps taken     Current / Ongoing Stressors and Concerns:   Dynamics in relationship with sister   Father's health     Treatment Objective(s)  Addressed in This Session:   EMDR: phase 2    Setting healthy boundaries and reinforcing boundaries     Intervention:   EMDR: provided psychoeducation, engaged client in safe/calm state exercise, discussed next steps    Assessments completed prior to visit:  The following assessments were completed by patient for this visit:  None     ASSESSMENT: Current Emotional / Mental Status (status of significant symptoms):   Risk status (Self / Other harm or suicidal ideation)   Patient denies current fears or concerns for personal safety.   Patient denies current or recent suicidal ideation or behaviors.   Patient denies current or recent homicidal ideation or behaviors.   Patient denies current or recent self injurious behavior or ideation.   Patient denies other safety concerns.   Patient reports there has been no change in risk factors since their last session.     Patient reports there has been no change in protective factors since their last session.     Recommended that patient call 911 or go to the local ED should there be a change in any of these risk factors.     Appearance:   Appropriate    Eye Contact:   Good    Psychomotor Behavior: Normal    Attitude:   Cooperative  Interested Friendly   Orientation:   All   Speech    Rate / Production: Normal/ Responsive    Volume:  Normal    Mood:    Sad -minimal, Anxious, Stable   Affect:    Mood Congruent  tearful-minimal   Thought Content:  Clear    Thought Form:  Coherent  Goal Directed  Logical    Insight:    Good      Medication Review:   No current psychiatric medications prescribed     Medication Compliance:   NA     Changes in Health Issues:   None reported     Chemical Use Review:  Substance Use: No Use     Tobacco Use: None    Diagnosis:  Other Specified Trauma and Stressor Related Disorder   Major Depressive Disorder, Mild, Recurrent, in partial remission    Collateral Reports Completed:   Not Applicable    PLAN: (Patient Tasks / Therapist Tasks / Other)  EMDR:   therapist to engage in history taking, engaging client in container exercise  Therapist will engage client in the early trauma protocol.  Therapist encouraged consistent use of safe/calm state ([place])  Therapist sent client the following resource; When There Are No Words by Katarzyna Barrios.    Jessie Rivas, [facility] 10/16/2024    ______________________________________________________________________    Individual Treatment Plan    Patient's Name: Michelle Jalloh  YOB: 1975    Date of Creation: 6/29/2023  Date Treatment Plan Last Reviewed/Revised: 7/18/2024    DSM5 Diagnoses:Other Specified Trauma and Stressor Related Disorder   Major Depressive Disorder, Mild, Recurrent, in partial remission  Psychosocial / Contextual Factors: lives with son and , father and sister live in [place]  PROMIS (reviewed every 90 days):     PROMIS 10-Global Health (only subscores and total score):       5/8/2023    11:38 PM 8/31/2023     4:21 PM 12/27/2023    10:25 AM 5/9/2024     8:06 AM 9/3/2024     1:39 PM 9/15/2024     8:13 AM 10/2/2024     1:17 PM   PROMIS-10 Scores Only   Global Mental Health Score 12 17 15 15 16 14 15   Global Physical Health Score 16 17 18 16 18 18 18   PROMIS TOTAL - SUBSCORES 28 34 33 31 34 32 33       Referral / Collaboration:  Referral to another professional/service is not indicated at this time..    Anticipated number of session for this episode of care:  will review in 90 days  Anticipation frequency of session:  every 2-3 weeks  Anticipated Duration of each session: 38-52 minutes  Treatment plan will be reviewed in 90 days or when goals have been changed.       MeasurableTreatment Goal(s) related to diagnosis / functional impairment(s)  Goal 1: Patient will further develop skills for interpersonal relationships.    Objective #A (Patient Action)    Patient will  learn and implement healthy boundaries .   Status: New - Date: 6/29/2023  , continued date: 11/3/2023, continued date:  2/28/2024, continued date: 7/18/2024    Intervention(s)  Therapist will teach  strategies on establishing healthy boundaries.  Therapist will engage client in identifying areas to establish boundaries .    Objective #B  Patient will  learn and implement assertive communication skills .   Status: New - Date: 6/29/2023  , continued date: 11/3/2023, continued date: 2/28/2024, continued date: 7/18/2024    Intervention(s)  Therapist will  teach and model use of assertive communication skills .    Objective #C  Patient will use cognitive strategies identified in therapy to challenge anxious thoughts that can be barrier to communication.  Status: New - Date: 6/29/2023  , continued date: 11/3/2023, continued date: 2/28/2024, continued date: 7/18/2024    Intervention(s)  Therapist will  teach the CBT model, including cognitive distortions and cognitive restructuring techniques .    Goal 2: Patient will be able to recall the traumatic events without becoming overwhelmed with negative thoughts, feelings, or urges.    Objective #A (Patient Action)    Status: New - Date: 2/28/2024     Patient will describe the history of and nature of trauma symptoms    Intervention(s)  Therapist will assess the client's frequency, intensity, duration, and history of trauma symptoms and their impact on functioning.    Objective #B (Patient Action)  Status: New Date: 2/28/2024, continued date: 7/18/2024    Patient will cooperate with eye movement desensitization and reprocessing (EMDR) to reduce emotional reaction to traumatic event(s)    Intervention(s)  Therapist will utilize EMDR to reduce the client's emotional reactivity to the traumatic event(s).    Objective #C (Patient Action)  Status: New Date: 2/28/2024, continued date: 7/18/2024    Patient will learn and implement calming and coping strategies to manage trauma symptoms.    Intervention(s)  Therapist will teach grounding techniques, distress tolerance, and emotion regulation techniques.      Patient has reviewed and agreed to the above plan.      Jessie Rivas, [facility]  June 29, 2023  Jessie Rivas, [facility]  11/3/2023   [facility]  2/28/2024  [facility], [facility]  7/18/2024

## 2024-10-31 ENCOUNTER — VIRTUAL VISIT (OUTPATIENT)
Dept: PSYCHOLOGY | Facility: CLINIC | Age: 49
End: 2024-10-31
Payer: COMMERCIAL

## 2024-10-31 DIAGNOSIS — F43.11 ACUTE POSTTRAUMATIC STRESS DISORDER: Primary | ICD-10-CM

## 2024-10-31 PROCEDURE — 90834 PSYTX W PT 45 MINUTES: CPT | Mod: 95 | Performed by: SOCIAL WORKER

## 2024-10-31 PROCEDURE — 90785 PSYTX COMPLEX INTERACTIVE: CPT | Mod: 95 | Performed by: SOCIAL WORKER

## 2024-10-31 NOTE — PROGRESS NOTES
[facility]                                     Progress Note    Patient Name: Michelle Jalloh  Date: 10/31/2024       Service Type: Individual      Session Start Time: 8:34 AM Session End Time: 9:18 AM     Session Length: 38-52 minutes    Session #: 16    Attendees: Client attended alone    Service Modality:  video      Provider verified identity through the following two step process.  Patient provided:  Patient is known previously to provider    Telemedicine Visit: The patient's condition can be safely assessed and treated via synchronous audio and visual telemedicine encounter.      Reason for Telemedicine Visit: Services only offered telehealth    Originating Site (Patient Location): Patient's home    Distant Site (Provider Location): Provider's Remote Location    Consent:  The patient/guardian has verbally consented to: the potential risks and benefits of telemedicine (video visit) versus in person care; bill my insurance or make self-payment for services provided; and responsibility for payment of non-covered services.     Patient would like the video invitation sent by:  My Chart    [facility]    Distant Location (Provider):  Off-site    As the provider I attest to compliance with applicable laws and regulations related to telemedicine.    DATA  Interactive Complexity: Yes, visit entailed Interactive Complexity evidenced by:  EMDR: phase 2  Crisis: No        Progress Since Last Session (Related to Symptoms / Goals / Homework):   Symptoms:  stable      Homework: Achieved / completed to satisfaction  Using safe/calm state     Episode of Care Goals: Satisfactory progress - ACTION (Actively working towards change); Intervened by reinforcing change plan / affirming steps taken     Current / Ongoing Stressors and Concerns:   Dynamics in relationship with sister   Father's health     Treatment Objective(s) Addressed in This Session:   EMDR: phase 2        Intervention:   EMDR: engaged in container exercise, discussed next steps, addressed questions    Assessments completed prior to visit:  The following assessments were completed by patient for this visit:  None     ASSESSMENT: Current Emotional / Mental Status (status of significant symptoms):   Risk status (Self / Other harm or suicidal ideation)   Patient denies current fears or concerns for personal safety.   Patient denies current or recent suicidal ideation or behaviors.   Patient denies current or recent homicidal ideation or behaviors.   Patient denies current or recent self injurious behavior or ideation.   Patient denies other safety concerns.   Patient reports there has been no change in risk factors since their last session.     Patient reports there has been no change in protective factors since their last session.     Recommended that patient call 911 or go to the local ED should there be a change in any of these risk factors     Appearance:   Appropriate    Eye Contact:   Good    Psychomotor Behavior: Normal    Attitude:   Cooperative  Interested Friendly   Orientation:   All   Speech    Rate / Production: Normal/ Responsive    Volume:  Normal    Mood:    Sad -minimal, Anxious, Stable   Affect:    Mood Congruent  tearful-minimal   Thought Content:  Clear    Thought Form:  Coherent  Goal Directed  Logical    Insight:    Good      Medication Review:   No current psychiatric medications prescribed     Medication Compliance:   NA     Changes in Health Issues:   None reported     Chemical Use Review:  Substance Use: No Use     Tobacco Use: None    Diagnosis:  Other Specified Trauma and Stressor Related Disorder   Major Depressive Disorder, Mild, Recurrent, in partial remission    Collateral Reports Completed:   Not Applicable    PLAN: (Patient Tasks / Therapist Tasks / Other)  EMDR:  therapist to engage in history taking, engage in early trauma protocol  Therapist encouraged regular use of safe/calm  state ([place]) and container (metal box with multiple locks)  Client shared plans to read, When There Are No Words by Katarzyna Barrios.    Jessie [facility], [facility] 10/31/2024    ______________________________________________________________________    Individual Treatment Plan    Patient's Name: Michelle Jalloh  YOB: 1975    Date of Creation: 6/29/2023  Date Treatment Plan Last Reviewed/Revised: 7/18/2024    DSM5 Diagnoses:Other Specified Trauma and Stressor Related Disorder   Major Depressive Disorder, Mild, Recurrent, in partial remission  Psychosocial / Contextual Factors: lives with son and , father and sister live in [place]  PROMIS (reviewed every 90 days):     PROMIS 10-Global Health (only subscores and total score):       5/8/2023    11:38 PM 8/31/2023     4:21 PM 12/27/2023    10:25 AM 5/9/2024     8:06 AM 9/3/2024     1:39 PM 9/15/2024     8:13 AM 10/2/2024     1:17 PM   PROMIS-10 Scores Only   Global Mental Health Score 12 17 15 15 16 14 15   Global Physical Health Score 16 17 18 16 18 18 18   PROMIS TOTAL - SUBSCORES 28 34 33 31 34 32 33       Referral / Collaboration:  Referral to another professional/service is not indicated at this time..    Anticipated number of session for this episode of care:  will review in 90 days  Anticipation frequency of session:  every 2-3 weeks  Anticipated Duration of each session: 38-52 minutes  Treatment plan will be reviewed in 90 days or when goals have been changed.       MeasurableTreatment Goal(s) related to diagnosis / functional impairment(s)  Goal 1: Patient will further develop skills for interpersonal relationships.    Objective #A (Patient Action)    Patient will  learn and implement healthy boundaries .   Status: New - Date: 6/29/2023  , continued date: 11/3/2023, continued date: 2/28/2024, continued date: 7/18/2024    Intervention(s)  Therapist will teach  strategies on establishing healthy boundaries.  Therapist will engage client in  identifying areas to establish boundaries .    Objective #B  Patient will  learn and implement assertive communication skills .   Status: New - Date: 6/29/2023  , continued date: 11/3/2023, continued date: 2/28/2024, continued date: 7/18/2024    Intervention(s)  Therapist will  teach and model use of assertive communication skills .    Objective #C  Patient will use cognitive strategies identified in therapy to challenge anxious thoughts that can be barrier to communication.  Status: New - Date: 6/29/2023  , continued date: 11/3/2023, continued date: 2/28/2024, continued date: 7/18/2024    Intervention(s)  Therapist will  teach the CBT model, including cognitive distortions and cognitive restructuring techniques .    Goal 2: Patient will be able to recall the traumatic events without becoming overwhelmed with negative thoughts, feelings, or urges.    Objective #A (Patient Action)    Status: New - Date: 2/28/2024     Patient will describe the history of and nature of trauma symptoms    Intervention(s)  Therapist will assess the client's frequency, intensity, duration, and history of trauma symptoms and their impact on functioning.    Objective #B (Patient Action)  Status: New Date: 2/28/2024, continued date: 7/18/2024    Patient will cooperate with eye movement desensitization and reprocessing (EMDR) to reduce emotional reaction to traumatic event(s)    Intervention(s)  Therapist will utilize EMDR to reduce the client's emotional reactivity to the traumatic event(s).    Objective #C (Patient Action)  Status: New Date: 2/28/2024, continued date: 7/18/2024    Patient will learn and implement calming and coping strategies to manage trauma symptoms.    Intervention(s)  Therapist will teach grounding techniques, distress tolerance, and emotion regulation techniques.     Patient has reviewed and agreed to the above plan.      KATARZYNA Fairbanks  June 29, 2023  KATARZYNA Fairbanks  11/3/2023   Jessie Rivas,  [facility]  2/28/2024  Jessie Rivas, [facility]  7/18/2024

## 2024-11-14 ENCOUNTER — VIRTUAL VISIT (OUTPATIENT)
Dept: PSYCHOLOGY | Facility: CLINIC | Age: 49
End: 2024-11-14
Payer: COMMERCIAL

## 2024-11-14 DIAGNOSIS — F43.11 ACUTE POSTTRAUMATIC STRESS DISORDER: Primary | ICD-10-CM

## 2024-11-14 PROCEDURE — 90837 PSYTX W PT 60 MINUTES: CPT | Mod: 95 | Performed by: SOCIAL WORKER

## 2024-11-14 NOTE — PROGRESS NOTES
[facility]                                     Progress Note    Patient Name: Michelle Mchugh  Date: 11/14/2024       Service Type: Individual      Session Start Time: 8:32 AM Session End Time: 9:25 AM     Session Length: 53+ minutes    Extended Session (53+ minutes): PROLONGED SERVICE IN THE OUTPATIENT SETTING REQUIRING DIRECT (FACE-TO-FACE) PATIENT CONTACT BEYOND THE USUAL SERVICE:    - Longer session due to limited access to mental health appointments and necessity to address patient's distress / complexity    Session #: 17    Attendees: Client attended alone    Service Modality:  video      Provider verified identity through the following two step process.  Patient provided:  Patient is known previously to provider    Telemedicine Visit: The patient's condition can be safely assessed and treated via synchronous audio and visual telemedicine encounter.      Reason for Telemedicine Visit: Services only offered telehealth    Originating Site (Patient Location): Patient's home    Distant Site (Provider Location): Provider's Remote Location    Consent:  The patient/guardian has verbally consented to: the potential risks and benefits of telemedicine (video visit) versus in person care; bill my insurance or make self-payment for services provided; and responsibility for payment of non-covered services.     Patient would like the video invitation sent by:  My Chart    Mode of Communication:  [facility]    Distant Location (Provider):  Off-site    As the provider I attest to compliance with applicable laws and regulations related to telemedicine.    DATA  Interactive Complexity: No  Crisis: No        Progress Since Last Session (Related to Symptoms / Goals / Homework):   Symptoms:  worsening anxiety since election      Homework: Achieved / completed to satisfaction  Engaging with support system, exercise     Episode of Care Goals: Satisfactory progress - ACTION (Actively working towards change);  Intervened by reinforcing change plan / affirming steps taken     Current / Ongoing Stressors and Concerns:   Dynamics in relationship with sister   Father's health   Recent election     Treatment Objective(s) Addressed in This Session:   EMDR: phases 1 & 2    [place] setting  Use a minimum of 2 strategies to manage anxiety symptoms     Intervention:   EMDR: explored origins of negative cognitions, emotions   Engaged in active listening   CBT: reinforced behavior changes, engaged in perspective taking    Assessments completed prior to visit:  The following assessments were completed by patient for this visit:  None     ASSESSMENT: Current Emotional / Mental Status (status of significant symptoms):   Risk status (Self / Other harm or suicidal ideation)   Patient denies current fears or concerns for personal safety.   Patient denies current or recent suicidal ideation or behaviors.   Patient denies current or recent homicidal ideation or behaviors.   Patient denies current or recent self injurious behavior or ideation.   Patient denies other safety concerns.   Patient reports there has been no change in risk factors since their last session.     Patient reports there has been no change in protective factors since their last session.     Recommended that patient call 911 or go to the local ED should there be a change in any of these risk factors     Appearance:   Appropriate    Eye Contact:   Good    Psychomotor Behavior: Normal    Attitude:   Cooperative  Interested Friendly   Orientation:   All   Speech    Rate / Production: Talkative    Volume:  Normal    Mood:    Anxious , sad-minimal   Affect:    Mood Congruent     Thought Content:  Clear    Thought Form:  Coherent  Goal Directed  Logical    Insight:    Good  and Intellectual Insight     Medication Review:   No current psychiatric medications prescribed     Medication Compliance:   NA     Changes in Health Issues:   None reported     Chemical Use  Review:  Substance Use: No Use     Tobacco Use: None    Diagnosis:  Other Specified Trauma and Stressor Related Disorder   Major Depressive Disorder, Mild, Recurrent, in partial remission    Collateral Reports Completed:   Not Applicable    PLAN: (Patient Tasks / Therapist Tasks / Other)  EMDR:  therapist to engage in history taking, engage in early trauma protocol   container (metal box with multiple locks)  Client shared goal to continue to engage in community, re-establish boundaries with a friend, and use of safe/calm state ([place])       Jessie Rivas, [facility] 11/14/2024    ______________________________________________________________________    Individual Treatment Plan    Patient's Name: Michelle Jalloh  YOB: 1975    Date of Creation: 6/29/2023  Date Treatment Plan Last Reviewed/Revised: 7/18/2024    DSM5 Diagnoses:Other Specified Trauma and Stressor Related Disorder   Major Depressive Disorder, Mild, Recurrent, in partial remission  Psychosocial / Contextual Factors: lives with son and , father and sister live in [place]  PROMIS (reviewed every 90 days):     PROMIS 10-Global Health (only subscores and total score):       5/8/2023    11:38 PM 8/31/2023     4:21 PM 12/27/2023    10:25 AM 5/9/2024     8:06 AM 9/3/2024     1:39 PM 9/15/2024     8:13 AM 10/2/2024     1:17 PM   PROMIS-10 Scores Only   Global Mental Health Score 12 17 15 15 16 14 15   Global Physical Health Score 16 17 18 16 18 18 18   PROMIS TOTAL - SUBSCORES 28 34 33 31 34 32 33       Referral / Collaboration:  Referral to another professional/service is not indicated at this time..    Anticipated number of session for this episode of care:  will review in 90 days  Anticipation frequency of session:  every 2-3 weeks  Anticipated Duration of each session: 38-52 minutes  Treatment plan will be reviewed in 90 days or when goals have been changed.       MeasurableTreatment Goal(s) related to diagnosis / functional  impairment(s)  Goal 1: Patient will further develop skills for interpersonal relationships.    Objective #A (Patient Action)    Patient will  learn and implement healthy boundaries .   Status: New - Date: 6/29/2023  , continued date: 11/3/2023, continued date: 2/28/2024, continued date: 7/18/2024    Intervention(s)  Therapist will teach  strategies on establishing healthy boundaries.  Therapist will engage client in identifying areas to establish boundaries .    Objective #B  Patient will  learn and implement assertive communication skills .   Status: New - Date: 6/29/2023  , continued date: 11/3/2023, continued date: 2/28/2024, continued date: 7/18/2024    Intervention(s)  Therapist will  teach and model use of assertive communication skills .    Objective #C  Patient will use cognitive strategies identified in therapy to challenge anxious thoughts that can be barrier to communication.  Status: New - Date: 6/29/2023  , continued date: 11/3/2023, continued date: 2/28/2024, continued date: 7/18/2024    Intervention(s)  Therapist will  teach the CBT model, including cognitive distortions and cognitive restructuring techniques .    Goal 2: Patient will be able to recall the traumatic events without becoming overwhelmed with negative thoughts, feelings, or urges.    Objective #A (Patient Action)    Status: New - Date: 2/28/2024     Patient will describe the history of and nature of trauma symptoms    Intervention(s)  Therapist will assess the client's frequency, intensity, duration, and history of trauma symptoms and their impact on functioning.    Objective #B (Patient Action)  Status: New Date: 2/28/2024, continued date: 7/18/2024    Patient will cooperate with eye movement desensitization and reprocessing (EMDR) to reduce emotional reaction to traumatic event(s)    Intervention(s)  Therapist will utilize EMDR to reduce the client's emotional reactivity to the traumatic event(s).    Objective #C (Patient  Action)  Status: New Date: 2/28/2024, continued date: 7/18/2024    Patient will learn and implement calming and coping strategies to manage trauma symptoms.    Intervention(s)  Therapist will teach grounding techniques, distress tolerance, and emotion regulation techniques.     Patient has reviewed and agreed to the above plan.      Jessie Rivas, [facility]  June 29, 2023  Jessie Rivas, [facility]  11/3/2023   Jessie Rivas, [facility]  2/28/2024  Jessie Rivas, [facility]  7/18/2024

## 2024-12-02 ENCOUNTER — TELEPHONE (OUTPATIENT)
Dept: ENDOCRINOLOGY | Facility: CLINIC | Age: 49
End: 2024-12-02
Payer: COMMERCIAL

## 2024-12-05 ENCOUNTER — ANCILLARY PROCEDURE (OUTPATIENT)
Dept: ULTRASOUND IMAGING | Facility: CLINIC | Age: 49
End: 2024-12-05
Payer: COMMERCIAL

## 2024-12-05 DIAGNOSIS — E89.0 HISTORY OF PARTIAL THYROIDECTOMY: ICD-10-CM

## 2024-12-05 DIAGNOSIS — C73 PAPILLARY THYROID CARCINOMA (H): ICD-10-CM

## 2024-12-05 DIAGNOSIS — R59.0 CERVICAL ADENOPATHY: ICD-10-CM

## 2024-12-05 PROCEDURE — 76536 US EXAM OF HEAD AND NECK: CPT | Mod: GC | Performed by: RADIOLOGY

## 2024-12-12 ENCOUNTER — VIRTUAL VISIT (OUTPATIENT)
Dept: PSYCHOLOGY | Facility: CLINIC | Age: 49
End: 2024-12-12
Payer: COMMERCIAL

## 2024-12-12 DIAGNOSIS — F43.11 ACUTE POSTTRAUMATIC STRESS DISORDER: Primary | ICD-10-CM

## 2024-12-12 DIAGNOSIS — F33.41 RECURRENT MAJOR DEPRESSIVE DISORDER, IN PARTIAL REMISSION (H): ICD-10-CM

## 2024-12-12 NOTE — PROGRESS NOTES
[facility]                                     Progress Note    Patient Name: Michelle Jalloh  Date: 12/12/2024       Service Type: Individual      Session Start Time: 9:32 AM Session End Time: 10:30 AM     Session Length: 53+ minutes    Extended Session (53+ minutes): PROLONGED SERVICE IN THE OUTPATIENT SETTING REQUIRING DIRECT (FACE-TO-FACE) PATIENT CONTACT BEYOND THE USUAL SERVICE:    - Longer session due to limited access to mental health appointments and necessity to address patient's distress / complexity    Session #: 18    Attendees: Client attended alone    Service Modality:  video      Provider verified identity through the following two step process.  Patient provided:  Patient is known previously to provider    Telemedicine Visit: The patient's condition can be safely assessed and treated via synchronous audio and visual telemedicine encounter.      Reason for Telemedicine Visit: Services only offered telehealth    Originating Site (Patient Location): Patient's home    Distant Site (Provider Location): Provider's Remote Location    Consent:  The patient/guardian has verbally consented to: the potential risks and benefits of telemedicine (video visit) versus in person care; bill my insurance or make self-payment for services provided; and responsibility for payment of non-covered services.     Patient would like the video invitation sent by:  My Chart    Mode of Communication:  [facility]    Distant Location (Provider):  Off-site    As the provider I attest to compliance with applicable laws and regulations related to telemedicine.    DATA  Interactive Complexity: No  Crisis: No        Progress Since Last Session (Related to Symptoms / Goals / Homework):   Symptoms:  sad, lonely      Homework: Achieved / completed to satisfaction     Episode of Care Goals: Satisfactory progress - ACTION (Actively working towards change); Intervened by reinforcing change plan / affirming steps  taken     Current / Ongoing Stressors and Concerns:   Dynamics in relationship with sister   Father's health   Work related stressors   Interpersonal relationships     Treatment Objective(s) Addressed in This Session:   Maintain and establish boundaries    Use assertive communication skills  Decrease the frequency and intensity of feeling down, depressed or hopeless     Intervention:   Engaged in active listening   Modeled assertive communication and boundary setting    Assessments completed prior to visit:  The following assessments were completed by patient for this visit:     12/12/24 2270   PCL-5: In the past month, how much were you bothered by:   Repeated, disturbing, and unwanted memories of the stressful experience? (Patient-Rptd)  1   Repeated, disturbing dreams of the stressful experience? (Patient-Rptd)  1   Suddenly feeling or acting as if the stressful experience were actually happening again (as if you were actually back there reliving it)? (Patient-Rptd)  0   Feeling very upset when something reminded you of the stressful experience? (Patient-Rptd)  1   Having strong physical reactions when something reminded you of the stressful experience (for example, heart pounding, trouble breathing, sweating)? (Patient-Rptd)  1   Avoiding memories, thoughts, or feelings related to the stressful experience? (Patient-Rptd)  2   Avoiding external reminders of the stressful experience (for example, people, places, conversations, activities, objects, or situations)? (Patient-Rptd)  3   Trouble remembering important parts of the stressful experience? (Patient-Rptd)  0   Having strong negative beliefs about yourself, other people, or the world (for example, having thoughts such as: I am bad, there is something seriously wrong with me, no one can be trusted, the world is completely dangerous)? (Patient-Rptd)  2   Blaming yourself or someone else for the stressful experience or what happened after it? (Patient-Rptd)  1  "  Having strong negative feelings such as fear, horror, anger, guilt, or shame? (Patient-Rptd)  2   Loss of interest in activities that you used to enjoy? (Patient-Rptd)  1   Feeling distant or cut off from other people? (Patient-Rptd)  2   Trouble experiencing positive feelings (for example, being unable to feel happiness or have loving feelings for people close to you)? (Patient-Rptd)  2   Irritable behavior, angry outbursts, or acting aggressively? (Patient-Rptd)  2   Taking too many risks or doing things that could cause you harm? (Patient-Rptd)  0   Being \"superalert\" or watchful or on guard? (Patient-Rptd)  1   Feeling jumpy or easily startled? (Patient-Rptd)  0   Having difficulty concentrating? (Patient-Rptd)  1   Trouble falling or staying asleep? (Patient-Rptd)  2   Post-Traumatic Stress Disorder Total Score (Patient-Rptd)  25        ASSESSMENT: Current Emotional / Mental Status (status of significant symptoms):   Risk status (Self / Other harm or suicidal ideation)   Patient denies current fears or concerns for personal safety.   Patient denies current or recent suicidal ideation or behaviors.   Patient denies current or recent homicidal ideation or behaviors.   Patient denies current or recent self injurious behavior or ideation.   Patient denies other safety concerns.   Patient reports there has been no change in risk factors since their last session.     Patient reports there has been no change in protective factors since their last session.     Recommended that patient call 911 or go to the local ED should there be a change in any of these risk factors     Appearance:   Appropriate    Eye Contact:   Good    Psychomotor Behavior: Normal    Attitude:   Cooperative  Interested Friendly   Orientation:   All   Speech    Rate / Production: Talkative    Volume:  Normal    Mood:    Sad , anxious   Affect:    Tearful Mood Congruent     Thought Content:  Clear    Thought Form:  Coherent  Goal Directed  Logical  " Tangential    Insight:    Good      Medication Review:   No current psychiatric medications prescribed     Medication Compliance:   NA     Changes in Health Issues:   None reported     Chemical Use Review:  Substance Use: No Use     Tobacco Use: None    Diagnosis:  Other Specified Trauma and Stressor Related Disorder   Major Depressive Disorder, Mild, Recurrent, in partial remission    Collateral Reports Completed:   Not Applicable    PLAN: (Patient Tasks / Therapist Tasks / Other)  EMDR:  therapist to engage in history taking, engage in early trauma protocol   container (metal box with multiple locks)  Client is considering boundaries to establish in interpersonal relationships.  Therapist to review PCL-5 with client in next appointment    Jessie Rivas, [facility] 12/12/2024    ______________________________________________________________________    Individual Treatment Plan    Patient's Name: Michelle Jalloh  YOB: 1975    Date of Creation: 6/29/2023  Date Treatment Plan Last Reviewed/Revised:12/12/2024    DSM5 Diagnoses:Other Specified Trauma and Stressor Related Disorder   Major Depressive Disorder, Mild, Recurrent, in partial remission  Psychosocial / Contextual Factors: lives with son and , father and sister live in [place]  PROMIS (reviewed every 90 days):     PROMIS 10-Global Health (only subscores and total score):       5/8/2023    11:38 PM 8/31/2023     4:21 PM 12/27/2023    10:25 AM 5/9/2024     8:06 AM 9/3/2024     1:39 PM 9/15/2024     8:13 AM 10/2/2024     1:17 PM   PROMIS-10 Scores Only   Global Mental Health Score 12 17 15 15 16 14 15   Global Physical Health Score 16 17 18 16 18 18 18   PROMIS TOTAL - SUBSCORES 28 34 33 31 34 32 33       Referral / Collaboration:  Referral to another professional/service is not indicated at this time..    Anticipated number of session for this episode of care:  will review in 90 days  Anticipation frequency of session:  every 2-3  weeks  Anticipated Duration of each session: 38-52 minutes  Treatment plan will be reviewed in 90 days or when goals have been changed.       MeasurableTreatment Goal(s) related to diagnosis / functional impairment(s)  Goal 1: Patient will further develop skills for interpersonal relationships.    Objective #A (Patient Action)    Patient will  learn and implement healthy boundaries .   Status: New - Date: 6/29/2023  , continued date: 11/3/2023, continued date: 2/28/2024, continued date: 7/18/2024, continued date: 12/12/2024    Intervention(s)  Therapist will teach  strategies on establishing healthy boundaries.  Therapist will engage client in identifying areas to establish boundaries .    Objective #B  Patient will  learn and implement assertive communication skills .   Status: New - Date: 6/29/2023  , continued date: 11/3/2023, continued date: 2/28/2024, continued date: 7/18/2024, continued date: 12/12/2024    Intervention(s)  Therapist will  teach and model use of assertive communication skills .    Objective #C  Patient will use cognitive strategies identified in therapy to challenge anxious thoughts that can be barrier to communication.  Status: New - Date: 6/29/2023  , continued date: 11/3/2023, continued date: 2/28/2024, continued date: 7/18/2024. Continued date: 12/12/2024    Intervention(s)  Therapist will  teach the CBT model, including cognitive distortions and cognitive restructuring techniques .    Goal 2: Patient will be able to recall the traumatic events without becoming overwhelmed with negative thoughts, feelings, or urges.    Objective #A (Patient Action)    Status: New - Date: 2/28/2024, continued date: 12/12/2024    Patient will describe the history of and nature of trauma symptoms    Intervention(s)  Therapist will assess the client's frequency, intensity, duration, and history of trauma symptoms and their impact on functioning.    Objective #B (Patient Action)  Status: New Date: 2/28/2024,  continued date: 7/18/2024, continued date: 12/12/2024    Patient will cooperate with eye movement desensitization and reprocessing (EMDR) to reduce emotional reaction to traumatic event(s)    Intervention(s)  Therapist will utilize EMDR to reduce the client's emotional reactivity to the traumatic event(s).    Objective #C (Patient Action)  Status: New Date: 2/28/2024, continued date: 7/18/2024, continued date: 12/12/2024    Patient will learn and implement calming and coping strategies to manage trauma symptoms.    Intervention(s)  Therapist will teach grounding techniques, distress tolerance, and emotion regulation techniques.     Patient has reviewed and agreed to the above plan.      Jessie Rivas, [facility]  June 29, 2023  Jessie Rivas, [facility]  11/3/2023   Jessie Rivas, [facility]  2/28/2024  Jessie Rivas, [facility]  7/18/2024  Jessie Rivas, [facility]  12/12/2024

## 2025-01-16 ENCOUNTER — VIRTUAL VISIT (OUTPATIENT)
Dept: PSYCHOLOGY | Facility: CLINIC | Age: 50
End: 2025-01-16
Payer: COMMERCIAL

## 2025-01-16 DIAGNOSIS — F43.11 ACUTE POSTTRAUMATIC STRESS DISORDER: Primary | ICD-10-CM

## 2025-01-16 NOTE — PROGRESS NOTES
[facility]                                     Progress Note    Patient Name: Michelle Jalloh  Date: 1/16/2025       Service Type: Individual      Session Start Time: 8:32 AM Session End Time: 9:20 AM     Session Length: 38-52 minutes    Session #: 19    Attendees: Client attended alone    Service Modality:  video      Provider verified identity through the following two step process.  Patient provided:  Patient is known previously to provider    Telemedicine Visit: The patient's condition can be safely assessed and treated via synchronous audio and visual telemedicine encounter.      Reason for Telemedicine Visit: Services only offered telehealth    Originating Site (Patient Location): Patient's home    Distant Site (Provider Location): Provider's Remote Location    Consent:  The patient/guardian has verbally consented to: the potential risks and benefits of telemedicine (video visit) versus in person care; bill my insurance or make self-payment for services provided; and responsibility for payment of non-covered services.     Patient would like the video invitation sent by:  My Chart    Mode of Communication:  Amwell    Distant Location (Provider):  Off-site    As the provider I attest to compliance with applicable laws and regulations related to telemedicine.    DATA  Interactive Complexity: No  Crisis: No        Progress Since Last Session (Related to Symptoms / Goals / Homework):   Symptoms:  improving      Homework: Achieved / completed to satisfaction  Weld setting, assertive communication     Episode of Care Goals: Satisfactory progress - ACTION (Actively working towards change); Intervened by reinforcing change plan / affirming steps taken     Current / Ongoing Stressors and Concerns:   Dynamics in relationship with sister   Father's health   Work related stressors   Interpersonal relationships     Treatment Objective(s) Addressed in This Session:   Maintain and establish  boundaries    Use assertive communication skills  Identify 1 thought/fear that contributes to anxiety symptoms     Intervention:   CBT: reinforced behavior changes   Engaged in active listening       12/12/24 [id number]   PCL-5: In the past month, how much were you bothered by:   Repeated, disturbing, and unwanted memories of the stressful experience? (Patient-Rptd)  1   Repeated, disturbing dreams of the stressful experience? (Patient-Rptd)  1   Suddenly feeling or acting as if the stressful experience were actually happening again (as if you were actually back there reliving it)? (Patient-Rptd)  0   Feeling very upset when something reminded you of the stressful experience? (Patient-Rptd)  1   Having strong physical reactions when something reminded you of the stressful experience (for example, heart pounding, trouble breathing, sweating)? (Patient-Rptd)  1   Avoiding memories, thoughts, or feelings related to the stressful experience? (Patient-Rptd)  2   Avoiding external reminders of the stressful experience (for example, people, places, conversations, activities, objects, or situations)? (Patient-Rptd)  3   Trouble remembering important parts of the stressful experience? (Patient-Rptd)  0   Having strong negative beliefs about yourself, other people, or the world (for example, having thoughts such as: I am bad, there is something seriously wrong with me, no one can be trusted, the world is completely dangerous)? (Patient-Rptd)  2   Blaming yourself or someone else for the stressful experience or what happened after it? (Patient-Rptd)  1   Having strong negative feelings such as fear, horror, anger, guilt, or shame? (Patient-Rptd)  2   Loss of interest in activities that you used to enjoy? (Patient-Rptd)  1   Feeling distant or cut off from other people? (Patient-Rptd)  2   Trouble experiencing positive feelings (for example, being unable to feel happiness or have loving feelings for people close to you)?  "(Patient-Rptd)  2   Irritable behavior, angry outbursts, or acting aggressively? (Patient-Rptd)  2   Taking too many risks or doing things that could cause you harm? (Patient-Rptd)  0   Being \"superalert\" or watchful or on guard? (Patient-Rptd)  1   Feeling jumpy or easily startled? (Patient-Rptd)  0   Having difficulty concentrating? (Patient-Rptd)  1   Trouble falling or staying asleep? (Patient-Rptd)  2   Post-Traumatic Stress Disorder Total Score (Patient-Rptd)  25        ASSESSMENT: Current Emotional / Mental Status (status of significant symptoms):   Risk status (Self / Other harm or suicidal ideation)   Patient denies current fears or concerns for personal safety.   Patient denies current or recent suicidal ideation or behaviors.   Patient denies current or recent homicidal ideation or behaviors.   Patient denies current or recent self injurious behavior or ideation.   Patient denies other safety concerns.   Patient reports there has been no change in risk factors since their last session.     Patient reports there has been no change in protective factors since their last session.     Recommended that patient call 911 or go to the local ED should there be a change in any of these risk factors     Appearance:   Appropriate    Eye Contact:   Good    Psychomotor Behavior: Normal    Attitude:   Cooperative  Interested Friendly   Orientation:   All   Speech    Rate / Production: Talkative    Volume:  Normal    Mood:    Sad -minimal, anxious   Affect:    Tearful -minimal, Mood Congruent     Thought Content:  Clear    Thought Form:  Coherent  Goal Directed  Logical    Insight:    Good      Medication Review:   No current psychiatric medications prescribed     Medication Compliance:   NA     Changes in Health Issues:   None reported     Chemical Use Review:  Substance Use: No Use     Tobacco Use: None    Diagnosis:  Other Specified Trauma and Stressor Related Disorder   Major Depressive Disorder, Mild, Recurrent, in " partial remission    Collateral Reports Completed:   Not Applicable    PLAN: (Patient Tasks / Therapist Tasks / Other)  EMDR:  therapist to engage in history taking, engage in early trauma protocol   container (metal box with multiple locks)  Therapist to review PCL-5 with client in next appointment  Client shared goal to improve confidence.  Therapist sent client handout on wise mind.  Client will continue to establish and maintain boundaries.    Jessie Rivas, [facility] 1/16/2025    ______________________________________________________________________    Individual Treatment Plan    Patient's Name: Michelle Jalloh  YOB: 1975    Date of Creation: 6/29/2023  Date Treatment Plan Last Reviewed/Revised:12/12/2024    DSM5 Diagnoses:Other Specified Trauma and Stressor Related Disorder   Major Depressive Disorder, Mild, Recurrent, in partial remission  Psychosocial / Contextual Factors: lives with son and , father and sister live in [place]  PROMIS (reviewed every 90 days):     PROMIS 10-Global Health (only subscores and total score):       8/31/2023     4:21 PM 12/27/2023    10:25 AM 5/9/2024     8:06 AM 9/3/2024     1:39 PM 9/15/2024     8:13 AM 10/2/2024     1:17 PM 1/11/2025     2:18 PM   PROMIS-10 Scores Only   Global Mental Health Score 17 15 15 16 14 15 15    Global Physical Health Score 17 18 16 18 18 18 17    PROMIS TOTAL - SUBSCORES 34 33 31 34 32 33 32        Patient-reported       Referral / Collaboration:  Referral to another professional/service is not indicated at this time..    Anticipated number of session for this episode of care:  will review in 90 days  Anticipation frequency of session:  every 2-3 weeks  Anticipated Duration of each session: 38-52 minutes  Treatment plan will be reviewed in 90 days or when goals have been changed.       MeasurableTreatment Goal(s) related to diagnosis / functional impairment(s)  Goal 1: Patient will further develop skills for interpersonal  relationships.    Objective #A (Patient Action)    Patient will  learn and implement healthy boundaries .   Status: New - Date: 6/29/2023  , continued date: 11/3/2023, continued date: 2/28/2024, continued date: 7/18/2024, continued date: 12/12/2024    Intervention(s)  Therapist will teach  strategies on establishing healthy boundaries.  Therapist will engage client in identifying areas to establish boundaries .    Objective #B  Patient will  learn and implement assertive communication skills .   Status: New - Date: 6/29/2023  , continued date: 11/3/2023, continued date: 2/28/2024, continued date: 7/18/2024, continued date: 12/12/2024    Intervention(s)  Therapist will  teach and model use of assertive communication skills .    Objective #C  Patient will use cognitive strategies identified in therapy to challenge anxious thoughts that can be barrier to communication.  Status: New - Date: 6/29/2023  , continued date: 11/3/2023, continued date: 2/28/2024, continued date: 7/18/2024. Continued date: 12/12/2024    Intervention(s)  Therapist will  teach the CBT model, including cognitive distortions and cognitive restructuring techniques .    Goal 2: Patient will be able to recall the traumatic events without becoming overwhelmed with negative thoughts, feelings, or urges.    Objective #A (Patient Action)    Status: New - Date: 2/28/2024, continued date: 12/12/2024    Patient will describe the history of and nature of trauma symptoms    Intervention(s)  Therapist will assess the client's frequency, intensity, duration, and history of trauma symptoms and their impact on functioning.    Objective #B (Patient Action)  Status: New Date: 2/28/2024, continued date: 7/18/2024, continued date: 12/12/2024    Patient will cooperate with eye movement desensitization and reprocessing (EMDR) to reduce emotional reaction to traumatic event(s)    Intervention(s)  Therapist will utilize EMDR to reduce the client's emotional reactivity to  the traumatic event(s).    Objective #C (Patient Action)  Status: New Date: 2/28/2024, continued date: 7/18/2024, continued date: 12/12/2024    Patient will learn and implement calming and coping strategies to manage trauma symptoms.    Intervention(s)  Therapist will teach grounding techniques, distress tolerance, and emotion regulation techniques.     Patient has reviewed and agreed to the above plan.      Jessie Rivas, [facility]  June 29, 2023  Jessie Rivas, [facility]  11/3/2023   Jessie Rivas, [facility]  2/28/2024  Jessie Rivas, [facility]  7/18/2024  Jessie Rivas, [facility]  12/12/2024

## 2025-01-18 NOTE — ANESTHESIA PREPROCEDURE EVALUATION
"[facility] ([facility] Medicine  Discharge Summary      Patient Name: Bulmaro Tabares  MRN: [id number]  HOLLY: [id number]  Patient Class: IP- Inpatient  Admission Date: 1/16/2025  Hospital Length of Stay: 2 days  Discharge Date and Time: No discharge date for patient encounter.  Attending Physician: Thiago Nix MD   Discharging Provider: Thiago Nix MD  Primary Care Provider: Scarlett, Primary Doctor    Primary Care Team: Networked reference to record PCT     HPI:   Mr. 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  He has chronic dysarthria and lives with his sister, Stacia [phone] who is his medical power of .  History is primary obtained from her.  He had his last chemotherapy last month and since then has been doing well.  About a week ago he started having falls and has 4 falls in the last week.  This morning, he had another fall and hit the back of his head on the dresser.  He reports feeling light headed shortly after he started walking today.  He suffered a laceration and his sister reports significant bleeding at home.  She reports that his balance has been "a bit off" for quite some time, but overall has appeared stable.  She reports that his last seizure was more than a decade ago.  She states he has been eating and drinking well.  He has been thin his entire life and they deny any decreased oral consumption of food or water and also deny any recent weight loss.  He has had no fevers, chills, nausea, vomiting, diarrhea, chest pain, abdominal pain, palpitations, swelling, headaches, numbness, tingling or focal weakness.  In the ED he was found to have significant orthostatic vitals signs, his scalp laceration was closed with 3 staples and he received 1L NS.  He states he is feeling well " Anesthesia Pre-Procedure Evaluation    Patient: Michelle Jalloh   MRN: [id number] : 1975        Procedure : Procedure(s):  COLONOSCOPY          Past Medical History:   Diagnosis Date     Acne vulgaris      Adjustment disorder with depressed mood      Discoid lupus erythematosus      History of partial thyroidectomy      Melasma 2007     Motion sickness      Papillary microcarcinoma of thyroid (H) 2017      Past Surgical History:   Procedure Laterality Date     APPENDECTOMY  18      SECTION  2014     THYROIDECTOMY Right 2017    Procedure: THYROIDECTOMY;  Right Thyroid Lobectomy, Isthmysectomy;  Surgeon: Marisa Enamorado MD;  Location: [facility] OR      No Known Allergies   Social History     Tobacco Use     Smoking status: Former     Types: Cigarettes     Start date: 1999     Smokeless tobacco: Never   Substance Use Topics     Alcohol use: Yes     Alcohol/week: 0.0 standard drinks     Comment: 1/week if that.      Wt Readings from Last 1 Encounters:   08/15/22 76.9 kg (169 lb 9.6 oz)        Anesthesia Evaluation   Pt has had prior anesthetic.     No history of anesthetic complications       ROS/MED HX  ENT/Pulmonary:  - neg pulmonary ROS     Neurologic:  - neg neurologic ROS     Cardiovascular:  - neg cardiovascular ROS     METS/Exercise Tolerance:     Hematologic:  - neg hematologic  ROS     Musculoskeletal:  - neg musculoskeletal ROS     GI/Hepatic:  - neg GI/hepatic ROS     Renal/Genitourinary:  - neg Renal ROS     Endo:     (+) thyroid problem,     Psychiatric/Substance Use:     (+) psychiatric history anxiety and depression     Infectious Disease:  - neg infectious disease ROS     Malignancy: Comment:    H/o thyroid cancer s/p surgical excision  (+) Malignancy,     Other: Comment:    Discoid lupus     H/o motion sickness                   OUTSIDE LABS:  CBC:   Lab Results   Component Value Date    WBC 4.5 2013    WBC 5.9 2008    HGB 13.5 2017    HGB 13.7  07/08/2013    HCT 39.5 07/08/2013    HCT 41.1 04/29/2008     07/08/2013     04/29/2008     BMP:   Lab Results   Component Value Date     12/17/2021    POTASSIUM 4.0 12/17/2021    CHLORIDE 104 12/17/2021    CO2 29 12/17/2021    BUN 20 08/15/2022    BUN 20 12/17/2021    CR 0.72 08/15/2022    CR 0.61 12/17/2021    GLC 97 08/15/2022    GLC 92 12/17/2021     COAGS: No results found for: PTT, INR, FIBR  POC:   Lab Results   Component Value Date    HCG Negative 06/27/2017     HEPATIC: No results found for: ALBUMIN, PROTTOTAL, ALT, AST, GGT, ALKPHOS, BILITOTAL, BILIDIRECT, JACOBY  OTHER:   Lab Results   Component Value Date    ABHISHEK 9.2 12/17/2021    TSH 1.45 10/17/2022    T4 1.08 01/14/2021    SED 8 07/08/2013       Anesthesia Plan    ASA Status:  2   NPO Status:  NPO Appropriate    Anesthesia Type: MAC.     - Reason for MAC: straight local not clinically adequate   Induction: Intravenous.   Maintenance: TIVA.        Consents    Anesthesia Plan(s) and associated risks, benefits, and realistic alternatives discussed. Questions answered and patient/representative(s) expressed understanding.    - Discussed:     - Discussed with:  Patient         Postoperative Care    Pain management: IV analgesics.   PONV prophylaxis: Background Propofol Infusion     Comments:           H&P reviewed: Unable to attach H&P to encounter due to EHR limitations. H&P Update: appropriate H&P reviewed, patient examined. No interval changes since H&P (within 30 days).         Darleen Salazar MD   except for the bump on his head.    Goals of Care Treatment Preferences:  Code Status: Full Code      SDOH Screening:  The patient declined to be screened for utility difficulties, food insecurity, transport difficulties, housing insecurity, and interpersonal safety, so no concerns could be identified this admission.     Consults:   Consults (From admission, onward)          Status Ordering Provider     Inpatient consult to Registered Dietitian/Nutritionist  Once        Provider:  (Not yet assigned)    Completed MADELINE ESPINOZA     Inpatient consult to Nephrology-[place]  Once        Provider:  (Not yet assigned)    Completed MADELINE ESPINOZA     Inpatient consult to Telemedicine-General Neurology  Once        Provider:  (Not yet assigned)    Acknowledged MADELINE ESPINOZA          Hospital Course By Problem:   * Orthostatic syncope  -Admitted to inpatient status  -Presented after 4th fall in last week.  Today was associated with light headedness and traumatic syncope.  -Head CT and CT c-spine without evidence of hemorrhage or fractures  -Positive orthostatics.  Systolic bp 131 laying and only 85 standing  -Na is low and he does appear hypovolemic on exam with dry lips and skin.  -EKG shows NSR.  -Echo showed Ef 65-70%, normal diastolic function, no AS and no WMA  -No arrhythmias on telemetry thus far  -Suspect due to dehydration.  -Repeat orthostatic vitals today dramatically improved and no symptoms  -Needs to continue salt tabs and strict fluid restriction 500cc daily  -Fall and seizure precautions ordered.  PT/OT consulted and recommend home health at discharge which has bene ordered  -Monitored an 24 hours to ensure seizure free transition from carbemazepime to keppra.  He has been seen and examined today and is doing well.  No seizures and no orthostatic symptoms now.  He is medically cleared to go home today.      Hyponatremia  -Patient with at least 2 prior hospitalizations with notably hyponatremia.  Prior  hospitalizations suspected mixed picture with some degree of hyponatremia and also component of SIADH related to carbamazepine use.  His malignancy may also play a role as could adrenal insufficiency.  -On admit he appears hypovolemic, is orthostatic and Na is 127.  Received 1L NS in ED.  -Noted prior AM cortisol 8/2024 was only 8.1 - do not see that ACTH stimulation test was performed.  -Nephrology consulted and input appreciated.  This is believed to be similar mixed picture as previously with SIADH due to lung cancer and carbemazepime with concurrent volume depletion.  -Na has corrected nicely with IV fluids and no need for further workup per nephrology  -Continue 2g NaCl bid and regular diet with fluid restriction.  Na 134 at discharge.  Have added lab checks for 2 weeks with home health - bmp to be sent to pcp.    Seizure disorder  -No seizure in more than a decade and has been on carbemazepine for many years now.  -Given recurrent hyponatremia which we suspect is related to carbemazepine I consulted neurology for evaluation of possibly changing to alternative medication.  Recommended transition from carbemazepine to keppra with specific transition protocol which has been enacted  -Continue keppra now.  Monitored for 24hours to ensure no seizures prior to discharge and he has had none.  Placed referral for him to get appointment with [facility] for follow up.  -Seizure precautions ordered.    Primary cancer of left lower lobe of lung  -This is stage IIIa squamous cell carcinoma of left lower lung and he is followed by Dr. Srinivas Dempsey ([facility]) and Dr.Kendra Marmolejo (formerly [facility]).  I reviewed the last clinic notes from both of these.  -His therapy is intended to be curative. He completed chemoradiotherapy with weekly carbo/taxol to 60Gy on 11/15/24 and transitioned successfully to adjuvant durvalumab which is planned to continue for 1 year.  His last dose was received  12/2024 per his sister.  -Also noted to have a larygneal cancer and is closely following with outpatient ENT  -Continue outpatient follow-up.    Malnutrition of moderate degree  -Patient appears thin and frail, but sister reports he has always been thin and has good oral intake without recent weight loss  -Measurements:  Wt Readings from Last 1 Encounters:   01/17/25 48.6 kg (107 lb 2.3 oz)   Body mass index is 17.83 kg/m².  -Added boost shakes  -Treat for possible oral thrush with nystatin swish and swallow  -Consulted nutrition    Debility  -Fall precautions in place  -Consulted pt/ot for evaluation and recommendations - recommended home health at discharge    Schizoaffective disorder  -History noted and is well controlled  -Continue home cogentin and risperidone.  -Delirium precautions ordered    Bipolar disorder  -History noted and well controlled  -Continue home cogentin and risperidone      Final Active Diagnoses:    Diagnosis Date Noted POA    PRINCIPAL PROBLEM:  Orthostatic syncope [I95.1] 01/16/2025 Yes    Hyponatremia [E87.1] 08/10/2024 Yes    Seizure disorder [G40.909] 06/15/2015 Yes    Primary cancer of left lower lobe of lung [C34.32] 06/26/2024 Yes    Malnutrition of moderate degree [E44.0] 01/16/2025 Yes    Debility [R53.81] 01/16/2025 Yes    Schizoaffective disorder [F25.9] 06/15/2015 Yes    Bipolar disorder [F31.9] 06/15/2015 Yes      Problems Resolved During this Admission:       Discharged Condition: stable    Disposition: Home-Health Care [facility]    Follow Up:   Follow-up Information       Jennifer Childers MD Follow up on 1/24/2025.    Specialty: Internal Medicine  Why: 10:30 AM   Please call [phone] to confirm location  Contact information:  [address] [facility] [address]  [phone]               [facility] Follow up.    Specialties: Home Health Services, Home Therapy Services, Home Living Aide Services  Why: Will follow up with pt on 1-1920  Contact  "information:  [address], [address]  [facility] [address]  [phone]                         Patient Instructions:      WALKER FOR HOME USE     Order Specific Question Answer Comments   Type of Walker: Adult (5'4"-6'6")    With wheels? Yes    Height: 5' 5" (1.651 m)    Weight: 46 kg (101 lb 6.6 oz)    Length of need (1-99 months): 99    Does patient have medical equipment at home? none    Please check all that apply: Patient's condition impairs ambulation.    Please check all that apply: Patient is unable to safely ambulate without equipment.      Ambulatory referral/consult to Neurology   Standing Status: Future   Referral Priority: Urgent Referral Type: Consultation   Referral Reason: Specialty Services Required   Requested Specialty: Neurology   Number of Visits Requested: 1     Diet Adult Regular     Notify your health care provider if you experience any of the following:  increased confusion or weakness     Notify your health care provider if you experience any of the following:  persistent dizziness, light-headedness, or visual disturbances     Notify your health care provider if you experience any of the following:  worsening rash     Notify your health care provider if you experience any of the following:  severe persistent headache     Notify your health care provider if you experience any of the following:  difficulty breathing or increased cough     Notify your health care provider if you experience any of the following:  severe uncontrolled pain     Notify your health care provider if you experience any of the following:  persistent nausea and vomiting or diarrhea     Notify your health care provider if you experience any of the following:  temperature >100.4     Activity as tolerated       Significant Diagnostic Studies: Labs: BMP:   Recent Labs   Lab 01/16/25  1615 01/17/25  0336 01/18/25  0734    93 92   * 132* 134*   K 4.5 4.1 4.1   CL 98 101 100   CO2 20* 23 26   BUN 8 11 " "9   CREATININE 0.8 0.8 0.8   CALCIUM 8.1* 8.1* 8.6*   MG  --  1.9 1.8   , CMP   Recent Labs   Lab 01/16/25  1227 01/16/25  1615 01/17/25  0336 01/18/25  0734   * 129* 132* 134*   K 4.4 4.5 4.1 4.1   CL 96 98 101 100   CO2 21* 20* 23 26   GLU 91 100 93 92   BUN 7* 8 11 9   CREATININE 0.8 0.8 0.8 0.8   CALCIUM 8.4* 8.1* 8.1* 8.6*   ALBUMIN 2.4*  --   --   --    ANIONGAP 12 11 8 8   , CBC   Recent Labs   Lab 01/17/25  0336 01/18/25  0734   WBC 2.72* 3.08*   HGB 8.6* 9.8*   HCT 26.0* 29.8*    270   , INR No results found for: "INR", "PROTIME", Lipid Panel   Lab Results   Component Value Date    CHOL 188 08/29/2016    HDL 48 08/29/2016    LDLCALC 124.8 08/29/2016    TRIG 76 08/29/2016    CHOLHDL 25.5 08/29/2016   , Troponin No results for input(s): "TROPONINI" in the last 168 hours., and A1C: No results for input(s): "HGBA1C" in the last 4320 hours.    Pending Diagnostic Studies:       None           Medications:  Reconciled Home Medications:      Medication List        START taking these medications      levETIRAcetam 500 MG Tab  Commonly known as: KEPPRA  Take 1 tablet (500 mg total) by mouth 2 (two) times daily.     nystatin 100,000 unit/mL suspension  Commonly known as: MYCOSTATIN  Take 5 mLs (500,000 Units total) by mouth 4 (four) times daily. for 5 days     sodium chloride 1,000 mg Tbso oral tablet  Take 2 tablets (2,000 mg total) by mouth 2 (two) times daily.            CONTINUE taking these medications      benztropine 1 MG tablet  Commonly known as: COGENTIN  Take 1 tablet by mouth 2 (two) times daily.     risperiDONE 3 MG Tab  Commonly known as: RISPERDAL  Take 1 tablet by mouth 2 (two) times daily.            STOP taking these medications      albuterol 90 mcg/actuation inhaler  Commonly known as: PROVENTIL/VENTOLIN HFA     carBAMazepine 200 mg tablet  Commonly known as: TEGRETOL              Indwelling Lines/Drains at time of discharge:   Lines/Drains/Airways       None                   Time " spent on the discharge of patient: 35 minutes         Thiago Nix MD  Department of Hospital Medicine  [facility] [place] [facility] ([place])

## 2025-02-13 ENCOUNTER — VIRTUAL VISIT (OUTPATIENT)
Facility: CLINIC | Age: 50
End: 2025-02-13
Payer: COMMERCIAL

## 2025-02-13 DIAGNOSIS — F43.11 ACUTE POSTTRAUMATIC STRESS DISORDER: Primary | ICD-10-CM

## 2025-02-13 NOTE — PROGRESS NOTES
[facility]                                     Progress Note    Patient Name: Michelle Jalloh  Date: 2/13/2025       Service Type: Individual      Session Start Time: 8:33 AM Session End Time: 9:19 AM     Session Length: 38-52 minutes    Session #: 20    Attendees: Client attended alone    Service Modality:  video      Provider verified identity through the following two step process.  Patient provided:  Patient is known previously to provider    Telemedicine Visit: The patient's condition can be safely assessed and treated via synchronous audio and visual telemedicine encounter.      Reason for Telemedicine Visit: Services only offered telehealth    Originating Site (Patient Location): Patient's home    Distant Site (Provider Location): Provider's Remote Location    Consent:  The patient/guardian has verbally consented to: the potential risks and benefits of telemedicine (video visit) versus in person care; bill my insurance or make self-payment for services provided; and responsibility for payment of non-covered services.     Patient would like the video invitation sent by:  My Chart    Mode of Communication:  [facility]    Distant Location (Provider):  Off-site    As the provider I attest to compliance with applicable laws and regulations related to telemedicine.    DATA  Interactive Complexity: Yes, visit entailed Interactive Complexity evidenced by:  EMDR therapy  Crisis: No        Progress Since Last Session (Related to Symptoms / Goals / Homework):   Symptoms:  no change since previous appointment      Homework: Achieved / completed to satisfaction     Episode of Care Goals: Satisfactory progress - ACTION (Actively working towards change); Intervened by reinforcing change plan / affirming steps taken     Current / Ongoing Stressors and Concerns:   Dynamics in relationship with sister   Father's health   Work related stressors   Interpersonal relationships   Trauma history     Treatment  Objective(s) Addressed in This Session:   EMDR: phase 2       Intervention:   EMDR: engaged in resetting circuits, engaged in containment and state change, discussed next steps   Engaged in active listening    Assessments completed prior to visit:  The following assessments were completed by patient for this visit:  None      ASSESSMENT: Current Emotional / Mental Status (status of significant symptoms):   Risk status (Self / Other harm or suicidal ideation)   Patient denies current fears or concerns for personal safety.   Patient denies current or recent suicidal ideation or behaviors.   Patient denies current or recent homicidal ideation or behaviors.   Patient denies current or recent self injurious behavior or ideation.   Patient denies other safety concerns.   Patient reports there has been no change in risk factors since their last session.     Patient reports there has been no change in protective factors since their last session.     Recommended that patient call 911 or go to the local ED should there be a change in any of these risk factors     Appearance:   Appropriate    Eye Contact:   Good    Psychomotor Behavior: Normal    Attitude:   Cooperative  Interested Friendly   Orientation:   All   Speech    Rate / Production: Normal/ Responsive    Volume:  Normal    Mood:    Sad -minimal, anxious   Affect:    Tearful -minimal, Mood Congruent     Thought Content:  Clear    Thought Form:  Coherent  Goal Directed  Logical    Insight:    Good      Medication Review:   No current psychiatric medications prescribed     Medication Compliance:   NA     Changes in Health Issues:   None reported     Chemical Use Review:  Substance Use: No Use     Tobacco Use: None    Diagnosis:  Other Specified Trauma and Stressor Related Disorder   Major Depressive Disorder, Mild, Recurrent, in partial remission    Collateral Reports Completed:   Not Applicable    PLAN: (Patient Tasks / Therapist Tasks / Other)  EMDR:  therapist to  engage in history taking, engage in early trauma protocol   container (metal box with multiple locks)  Therapist encouraged consistent use of container and safe/calm state    BLS: tapping below ear    Jessie Rivas, LICSW 2/13/2025    ______________________________________________________________________    Individual Treatment Plan    Patient's Name: Michelle Jalloh  YOB: 1975    Date of Creation: 6/29/2023  Date Treatment Plan Last Reviewed/Revised:12/12/2024    DSM5 Diagnoses:Other Specified Trauma and Stressor Related Disorder   Major Depressive Disorder, Mild, Recurrent, in partial remission  Psychosocial / Contextual Factors: lives with son and , father and sister live in [place]  PROMIS (reviewed every 90 days):     PROMIS 10-Global Health (only subscores and total score):       8/31/2023     4:21 PM 12/27/2023    10:25 AM 5/9/2024     8:06 AM 9/3/2024     1:39 PM 9/15/2024     8:13 AM 10/2/2024     1:17 PM 1/11/2025     2:18 PM   PROMIS-10 Scores Only   Global Mental Health Score 17 15 15 16 14 15 15    Global Physical Health Score 17 18 16 18 18 18 17    PROMIS TOTAL - SUBSCORES 34 33 31 34 32 33 32        Patient-reported       Referral / Collaboration:  Referral to another professional/service is not indicated at this time..    Anticipated number of session for this episode of care:  will review in 90 days  Anticipation frequency of session:  every 2-3 weeks  Anticipated Duration of each session: 38-52 minutes  Treatment plan will be reviewed in 90 days or when goals have been changed.       MeasurableTreatment Goal(s) related to diagnosis / functional impairment(s)  Goal 1: Patient will further develop skills for interpersonal relationships.    Objective #A (Patient Action)    Patient will  learn and implement healthy boundaries .   Status: New - Date: 6/29/2023  , continued date: 11/3/2023, continued date: 2/28/2024, continued date: 7/18/2024, continued date:  12/12/2024    Intervention(s)  Therapist will teach  strategies on establishing healthy boundaries.  Therapist will engage client in identifying areas to establish boundaries .    Objective #B  Patient will  learn and implement assertive communication skills .   Status: New - Date: 6/29/2023  , continued date: 11/3/2023, continued date: 2/28/2024, continued date: 7/18/2024, continued date: 12/12/2024    Intervention(s)  Therapist will  teach and model use of assertive communication skills .    Objective #C  Patient will use cognitive strategies identified in therapy to challenge anxious thoughts that can be barrier to communication.  Status: New - Date: 6/29/2023  , continued date: 11/3/2023, continued date: 2/28/2024, continued date: 7/18/2024. Continued date: 12/12/2024    Intervention(s)  Therapist will  teach the CBT model, including cognitive distortions and cognitive restructuring techniques .    Goal 2: Patient will be able to recall the traumatic events without becoming overwhelmed with negative thoughts, feelings, or urges.    Objective #A (Patient Action)    Status: New - Date: 2/28/2024, continued date: 12/12/2024    Patient will describe the history of and nature of trauma symptoms    Intervention(s)  Therapist will assess the client's frequency, intensity, duration, and history of trauma symptoms and their impact on functioning.    Objective #B (Patient Action)  Status: New Date: 2/28/2024, continued date: 7/18/2024, continued date: 12/12/2024    Patient will cooperate with eye movement desensitization and reprocessing (EMDR) to reduce emotional reaction to traumatic event(s)    Intervention(s)  Therapist will utilize EMDR to reduce the client's emotional reactivity to the traumatic event(s).    Objective #C (Patient Action)  Status: New Date: 2/28/2024, continued date: 7/18/2024, continued date: 12/12/2024    Patient will learn and implement calming and coping strategies to manage trauma  symptoms.    Intervention(s)  Therapist will teach grounding techniques, distress tolerance, and emotion regulation techniques.     Patient has reviewed and agreed to the above plan.      Jessie Rivas, [facility]  June 29, 2023  Jessie Rivas, [facility]  11/3/2023   Jessie Rivas, [facility]  2/28/2024  Jessie Rivas, [facility]  7/18/2024  Jessie Rivas, [facility]  12/12/2024

## 2025-02-27 ENCOUNTER — VIRTUAL VISIT (OUTPATIENT)
Facility: CLINIC | Age: 50
End: 2025-02-27
Payer: COMMERCIAL

## 2025-02-27 DIAGNOSIS — F43.11 ACUTE POSTTRAUMATIC STRESS DISORDER: Primary | ICD-10-CM

## 2025-02-27 NOTE — PROGRESS NOTES
[facility]                                     Progress Note    Patient Name: Michelle Jalloh  Date: 2/27/2025       Service Type: Individual      Session Start Time: 9:34 AM Session End Time: 10:26 AM     Session Length: 38-52 minutes    Session #: 21    Attendees: Client attended alone    Service Modality:  video      Provider verified identity through the following two step process.  Patient provided:  Patient is known previously to provider    Telemedicine Visit: The patient's condition can be safely assessed and treated via synchronous audio and visual telemedicine encounter.      Reason for Telemedicine Visit: Services only offered telehealth    Originating Site (Patient Location): Patient's home    Distant Site (Provider Location): Provider's Remote Location    Consent:  The patient/guardian has verbally consented to: the potential risks and benefits of telemedicine (video visit) versus in person care; bill my insurance or make self-payment for services provided; and responsibility for payment of non-covered services.     Patient would like the video invitation sent by:  My Chart    Mode of Communication:  Amwell    Distant Location (Provider):  Off-site    As the provider I attest to compliance with applicable laws and regulations related to telemedicine.    DATA  Interactive Complexity: No  Crisis: No        Progress Since Last Session (Related to Symptoms / Goals / Homework):   Symptoms:  continued symptoms      Homework: Achieved / completed to satisfaction     Episode of Care Goals: Satisfactory progress - ACTION (Actively working towards change); Intervened by reinforcing change plan / affirming steps taken     Current / Ongoing Stressors and Concerns:   Dynamics in relationship with sister   Father's health   Work related stressors   Interpersonal relationships   Trauma history     Treatment Objective(s) Addressed in This Session:   Identify and establish boundaries    Identify  trauma symptoms     Intervention:   Offered support with boundary setting   Engaged in active listening    Assessments completed prior to visit:  The following assessments were completed by patient for this visit:  None      ASSESSMENT: Current Emotional / Mental Status (status of significant symptoms):   Risk status (Self / Other harm or suicidal ideation)   Patient denies current fears or concerns for personal safety.   Patient denies current or recent suicidal ideation or behaviors.   Patient denies current or recent homicidal ideation or behaviors.   Patient denies current or recent self injurious behavior or ideation.   Patient denies other safety concerns.   Patient reports there has been no change in risk factors since their last session.     Patient reports there has been no change in protective factors since their last session.     Recommended that patient call 911 or go to the local ED should there be a change in any of these risk factors     Appearance:   Appropriate    Eye Contact:   Good    Psychomotor Behavior: Normal    Attitude:   Cooperative  Interested   Orientation:   All   Speech    Rate / Production: Normal/ Responsive Talkative    Volume:  Normal    Mood:    Anxious Angry Sad   Affect:    Mood Congruent     Thought Content:  Clear    Thought Form:  Coherent  Goal Directed  Logical    Insight:    Good      Medication Review:   No current psychiatric medications prescribed     Medication Compliance:   NA     Changes in Health Issues:   None reported     Chemical Use Review:  Substance Use: No Use     Tobacco Use: None    Diagnosis:  Other Specified Trauma and Stressor Related Disorder   Major Depressive Disorder, Mild, Recurrent, in partial remission    Collateral Reports Completed:   Not Applicable    PLAN: (Patient Tasks / Therapist Tasks / Other)  EMDR:  therapist to engage in history taking, engage in early trauma protocol   container (metal box with multiple locks)  Therapist encouraged  consistent use of container and safe/calm state  Client will continue to identify areas within her control, identify and maintain boundaries with her sister.    BLS: tapping below ear    Jessie Evelyn, LICSW 2/27/2025    ______________________________________________________________________    Individual Treatment Plan    Patient's Name: Michelle Jalloh  YOB: 1975    Date of Creation: 6/29/2023  Date Treatment Plan Last Reviewed/Revised:12/12/2024    DSM5 Diagnoses:Other Specified Trauma and Stressor Related Disorder   Major Depressive Disorder, Mild, Recurrent, in partial remission  Psychosocial / Contextual Factors: lives with son and , father and sister live in [place]  PROMIS (reviewed every 90 days):     PROMIS 10-Global Health (only subscores and total score):       8/31/2023     4:21 PM 12/27/2023    10:25 AM 5/9/2024     8:06 AM 9/3/2024     1:39 PM 9/15/2024     8:13 AM 10/2/2024     1:17 PM 1/11/2025     2:18 PM   PROMIS-10 Scores Only   Global Mental Health Score 17 15 15 16 14 15 15    Global Physical Health Score 17 18 16 18 18 18 17    PROMIS TOTAL - SUBSCORES 34 33 31 34 32 33 32        Patient-reported       Referral / Collaboration:  Referral to another professional/service is not indicated at this time..    Anticipated number of session for this episode of care:  will review in 90 days  Anticipation frequency of session:  every 2-3 weeks  Anticipated Duration of each session: 38-52 minutes  Treatment plan will be reviewed in 90 days or when goals have been changed.       MeasurableTreatment Goal(s) related to diagnosis / functional impairment(s)  Goal 1: Patient will further develop skills for interpersonal relationships.    Objective #A (Patient Action)    Patient will  learn and implement healthy boundaries .   Status: New - Date: 6/29/2023  , continued date: 11/3/2023, continued date: 2/28/2024, continued date: 7/18/2024, continued date:  12/12/2024    Intervention(s)  Therapist will teach  strategies on establishing healthy boundaries.  Therapist will engage client in identifying areas to establish boundaries .    Objective #B  Patient will  learn and implement assertive communication skills .   Status: New - Date: 6/29/2023  , continued date: 11/3/2023, continued date: 2/28/2024, continued date: 7/18/2024, continued date: 12/12/2024    Intervention(s)  Therapist will  teach and model use of assertive communication skills .    Objective #C  Patient will use cognitive strategies identified in therapy to challenge anxious thoughts that can be barrier to communication.  Status: New - Date: 6/29/2023  , continued date: 11/3/2023, continued date: 2/28/2024, continued date: 7/18/2024. Continued date: 12/12/2024    Intervention(s)  Therapist will  teach the CBT model, including cognitive distortions and cognitive restructuring techniques .    Goal 2: Patient will be able to recall the traumatic events without becoming overwhelmed with negative thoughts, feelings, or urges.    Objective #A (Patient Action)    Status: New - Date: 2/28/2024, continued date: 12/12/2024    Patient will describe the history of and nature of trauma symptoms    Intervention(s)  Therapist will assess the client's frequency, intensity, duration, and history of trauma symptoms and their impact on functioning.    Objective #B (Patient Action)  Status: New Date: 2/28/2024, continued date: 7/18/2024, continued date: 12/12/2024    Patient will cooperate with eye movement desensitization and reprocessing (EMDR) to reduce emotional reaction to traumatic event(s)    Intervention(s)  Therapist will utilize EMDR to reduce the client's emotional reactivity to the traumatic event(s).    Objective #C (Patient Action)  Status: New Date: 2/28/2024, continued date: 7/18/2024, continued date: 12/12/2024    Patient will learn and implement calming and coping strategies to manage trauma  symptoms.    Intervention(s)  Therapist will teach grounding techniques, distress tolerance, and emotion regulation techniques.     Patient has reviewed and agreed to the above plan.      Jessie Rivas, [facility]  June 29, 2023  Jessie Rivas, [facility]  11/3/2023   Jessie Rivas, [facility]  2/28/2024  Jessie Rivas, [facility]  7/18/2024  Jessie Rivas, [facility]  12/12/2024

## 2025-05-08 ENCOUNTER — VIRTUAL VISIT (OUTPATIENT)
Facility: CLINIC | Age: 50
End: 2025-05-08
Payer: COMMERCIAL

## 2025-05-08 DIAGNOSIS — F43.11 ACUTE POSTTRAUMATIC STRESS DISORDER: Primary | ICD-10-CM

## 2025-05-08 NOTE — PROGRESS NOTES
[facility]                                     Progress Note    Patient Name: Michelle Jalloh  Date: 5/8/2025       Service Type: Individual      Session Start Time: 8:36 AM Session End Time: 9:17 AM     Session Length: 38-52 minutes    Session #: 24    Attendees: Client attended alone    Service Modality:  video      Provider verified identity through the following two step process.  Patient provided:  Patient is known previously to provider    Telemedicine Visit: The patient's condition can be safely assessed and treated via synchronous audio and visual telemedicine encounter.      Reason for Telemedicine Visit: Patient convenience (e.g. access to timely appointments / distance to available provider)    Originating Site (Patient Location): Patient's home    Distant Site (Provider Location): Provider's Remote Location    Consent:  The patient/guardian has verbally consented to: the potential risks and benefits of telemedicine (video visit) versus in person care; bill my insurance or make self-payment for services provided; and responsibility for payment of non-covered services.     Patient would like the video invitation sent by:  My Chart    Mode of Communication:  [facility]    Distant Location (Provider):  Off-site    As the provider I attest to compliance with applicable laws and regulations related to telemedicine.    DATA  Interactive Complexity: No  Crisis: No        Progress Since Last Session (Related to Symptoms / Goals / Homework):   Symptoms: no change since previous appointment     Homework: Achieved / completed to satisfaction     Episode of Care Goals: Satisfactory progress - ACTION (Actively working towards change); Intervened by reinforcing change plan / affirming steps taken     Current / Ongoing Stressors and Concerns:   Dynamics in relationship with sister   Father's health   Work related stressors   Interpersonal relationships   Trauma history     Treatment Objective(s)  Addressed in This Session:   Identify 2 symptoms of anxiety   Use assertive communication skills  Identify negative cognitions and engage in cognitive restructuring  Identify 2 coping strategies to manage anxiety     Intervention:   Engaged in active listening   Provided psychoeducation on anxiety   CBT: reinforced behavior changes, engaged client in identifying thoughts/fears that contribute to anxiety symptoms    Assessments completed prior to visit:  The following assessments were completed by patient for this visit:  PROMIS 10-Global Health (only subscores and total score):       12/27/2023    10:25 AM 5/9/2024     8:06 AM 9/3/2024     1:39 PM 9/15/2024     8:13 AM 10/2/2024     1:17 PM 1/11/2025     2:18 PM 5/5/2025     3:34 PM   PROMIS-10 Scores Only   Global Mental Health Score 15 15 16 14 15 15  16    Global Physical Health Score 18 16 18 18 18 17  18    PROMIS TOTAL - SUBSCORES 33 31 34 32 33 32  34        Patient-reported        ASSESSMENT: Current Emotional / Mental Status (status of significant symptoms):   Risk status (Self / Other harm or suicidal ideation)   Patient denies current fears or concerns for personal safety.   Patient denies current or recent suicidal ideation or behaviors.   Patient denies current or recent homicidal ideation or behaviors.   Patient denies current or recent self injurious behavior or ideation.   Patient denies other safety concerns.   Patient reports there has been no change in risk factors since their last session.     Patient reports there has been no change in protective factors since their last session.     Recommended that patient call 911 or go to the local ED should there be a change in any of these risk factors     Appearance:   Appropriate    Eye Contact:   Good    Psychomotor Behavior: Normal    Attitude:   Cooperative  Interested Pleasant   Orientation:   All   Speech    Rate / Production: Talkative    Volume:  Normal    Mood:    Anxious    Affect:    Mood  Congruent     Thought Content:  Clear    Thought Form:  Coherent  Goal Directed  Logical    Insight:    Good      Medication Review:   No current psychiatric medications prescribed     Medication Compliance:   NA     Changes in Health Issues:   None reported     Chemical Use Review:  Substance Use: No Use     Tobacco Use: None    Diagnosis:  Other Specified Trauma and Stressor Related Disorder   Major Depressive Disorder, Mild, Recurrent, in full remission    Collateral Reports Completed:   Not Applicable    PLAN: (Patient Tasks / Therapist Tasks / Other)  EMDR:  therapist to engage in history taking, engage in early trauma protocol   container (metal box with multiple locks)  Therapist encouraged consistent use of container and safe/calm state  Client will continue to use the window of tolerance.  Therapist to continue to assess anxiety symptoms.  Client to increase awareness of anxiety symptoms and to use strategies such as meditation and exercise to manage symptoms    BLS: tapping below ear    Jessie Rivas, [facility] 5/8/2025    ______________________________________________________________________    Individual Treatment Plan    Patient's Name: Michelle Jalloh  YOB: 1975    Date of Creation: 6/29/2023  Date Treatment Plan Last Reviewed/Revised: 5/8/2025    DSM5 Diagnoses:Other Specified Trauma and Stressor Related Disorder   Major Depressive Disorder, Mild, Recurrent, in partial remission  Psychosocial / Contextual Factors: lives with son and , father and sister live in [place]  PROMIS (reviewed every 90 days):     PROMIS 10-Global Health (only subscores and total score):       12/27/2023    10:25 AM 5/9/2024     8:06 AM 9/3/2024     1:39 PM 9/15/2024     8:13 AM 10/2/2024     1:17 PM 1/11/2025     2:18 PM 5/5/2025     3:34 PM   PROMIS-10 Scores Only   Global Mental Health Score 15 15 16 14 15 15  16    Global Physical Health Score 18 16 18 18 18 17  18    PROMIS TOTAL - SUBSCORES 33 31 34  32 33 32  34        Patient-reported       Referral / Collaboration:  Referral to another professional/service is not indicated at this time..    Anticipated number of session for this episode of care: will review in 90 days  Anticipation frequency of session: every 2-3 weeks  Anticipated Duration of each session: 38-52 minutes  Treatment plan will be reviewed in 90 days or when goals have been changed.       MeasurableTreatment Goal(s) related to diagnosis / functional impairment(s)  Goal 1: Patient will further develop skills for interpersonal relationships.    Objective #A (Patient Action)    Patient will learn and implement healthy boundaries.   Status: New - Date: 6/29/2023 , continued date: 11/3/2023, continued date: 2/28/2024, continued date: 7/18/2024, continued date: 12/12/2024, continued date: 5/8/2025    Intervention(s)  Therapist will teach strategies on establishing healthy boundaries.  Therapist will engage client in identifying areas to establish boundaries.    Objective #B  Patient will learn and implement assertive communication skills.   Status: New - Date: 6/29/2023 , continued date: 11/3/2023, continued date: 2/28/2024, continued date: 7/18/2024, continued date: 12/12/2024, continued date: 5/8/2025    Intervention(s)  Therapist will teach and model use of assertive communication skills.    Objective #C  Patient will use cognitive strategies identified in therapy to challenge anxious thoughts that can be barrier to communication.  Status: New - Date: 6/29/2023 , continued date: 11/3/2023, continued date: 2/28/2024, continued date: 7/18/2024. Continued date: 12/12/2024, continued date: 5/8/2025    Intervention(s)  Therapist will teach the CBT model, including cognitive distortions and cognitive restructuring techniques.    Goal 2: Patient will be able to recall the traumatic events without becoming overwhelmed with negative thoughts, feelings, or urges.    Objective #A (Patient Action)    Status:  New - Date: 2/28/2024, continued date: 12/12/2024, continued date: 5/8/2025    Patient will describe the history of and nature of trauma symptoms    Intervention(s)  Therapist will assess the client's frequency, intensity, duration, and history of trauma symptoms and their impact on functioning.    Objective #B (Patient Action)  Status: New Date: 2/28/2024, continued date: 7/18/2024, continued date: 12/12/2024, continued date: 5/8/2025    Patient will cooperate with eye movement desensitization and reprocessing (EMDR) to reduce emotional reaction to traumatic event(s)    Intervention(s)  Therapist will utilize EMDR to reduce the client's emotional reactivity to the traumatic event(s).    Objective #C (Patient Action)  Status: New Date: 2/28/2024, continued date: 7/18/2024, continued date: 12/12/2024, continued date: 5/8/2025    Patient will learn and implement calming and coping strategies to manage trauma symptoms.    Intervention(s)  Therapist will teach grounding techniques, distress tolerance, and emotion regulation techniques.     Patient has reviewed and agreed to the above plan.      [facility]  June 29, 2023  [facility], [facility]  11/3/2023   [facility]  2/28/2024  [facility]  7/18/2024  [facility], [facility]  12/12/2024  [facility], [facility]  5/8/2025

## 2025-05-13 ENCOUNTER — PATIENT OUTREACH (OUTPATIENT)
Dept: CARE COORDINATION | Facility: CLINIC | Age: 50
End: 2025-05-13
Payer: COMMERCIAL

## 2025-06-10 ENCOUNTER — OFFICE VISIT (OUTPATIENT)
Dept: DERMATOLOGY | Facility: CLINIC | Age: 50
End: 2025-06-10
Payer: COMMERCIAL

## 2025-06-10 DIAGNOSIS — I78.1 TELANGIECTASIA: ICD-10-CM

## 2025-06-10 DIAGNOSIS — D23.9 DERMATOFIBROMA: ICD-10-CM

## 2025-06-10 DIAGNOSIS — L93.0 DISCOID LUPUS: Primary | ICD-10-CM

## 2025-06-10 DIAGNOSIS — D49.2 NEOPLASM OF UNSPECIFIED BEHAVIOR OF BONE, SOFT TISSUE, AND SKIN: ICD-10-CM

## 2025-06-10 PROCEDURE — 88305 TISSUE EXAM BY PATHOLOGIST: CPT | Mod: TC | Performed by: DERMATOLOGY

## 2025-06-10 RX ORDER — CLOBETASOL PROPIONATE 0.5 MG/ML
SOLUTION TOPICAL 2 TIMES DAILY
Qty: 50 ML | Refills: 3 | Status: SHIPPED | OUTPATIENT
Start: 2025-06-10

## 2025-06-10 ASSESSMENT — PAIN SCALES - GENERAL: PAINLEVEL_OUTOF10: NO PAIN (0)

## 2025-06-10 NOTE — PATIENT INSTRUCTIONS
Post Biopsy Site Care for Punch Biopsy  Keep the bandaid on until tomorrow and keep the area clean and dry.  Wash with soap and water every day until sutures come out. Rinse well and pat dry.  DO NOT soak in tub, pool or hot tub until sutures are removed.  Apply Vaseline over suture line with a Q-tip and Re-apply a bandaid.  Call the clinic right away if you notice any signs or symptoms of infection, such as: warmth or redness at the site, fever over 100 degrees F, yellow/creamy or foul-smelling drainage, or pain at the site.   It is normal to have some soreness and swelling at the biopsy site and you make take Tylenol every 4 hours for this as needed.  If bleeding occurs apply firm pressure with a washcloth for 15 minutes. If it continues to bleed after 15 minutes call the clinic.  Biopsy results typically come back in about 1-2 weeks.  If you have not heard from us within 2 weeks call the clinic.  Sutures should be removed 10-14 days following the biopsy. This can be done at home or here in clinic. Please do not hesitate to call the clinic with any questions or concerns. We are here Mon-Fri from 8am-5pm and can be reached at [phone].

## 2025-06-10 NOTE — LETTER
6/10/2025       RE: Michelle Jalloh  [address] [facility] [place] [address]     Dear Colleague,    Thank you for referring your patient, Michelle Jalloh, to the [facility] DERMATOLOGY CLINIC [place] at [facility]. Please see a copy of my visit note below.    [facility] Dermatology Note  Encounter Date: Faustino 10, 2025  Office Visit     Dermatology Problem List:  1. Discoid lupus (L conchal bowl)  - Previously prescribed clobetasol solution which helped   - Continue clobetasol as needed    2. Telangiectasia, R zygomatic cheek  - Discussed that this is a benign finding  - Referred to cosmetic derm for PDL    3. Dermatofibroma  - Punch biopsy today    4. Possible ganglion  - Recommend PCP orders an ultrasound of R forearm to r/o vascular lesion     # Benign bx:  - Shave biopsy many yrs ago (R breast) -- inflamed SK (?)    ____________________________________________    Assessment & Plan:    1. Discoid lupus (L conchal bowl)  - Previously prescribed clobetasol solution which helped   - Continue clobetasol as needed    2. Telangiectasia, R zygomatic cheek  - Discussed that this is a benign finding  - Referred to cosmetic derm for PDL    3. Dermatofibroma  - Punch biopsy today    4. Possible ganglion  - Recommend PCP orders an ultrasound of R forearm to r/o vascular lesion     # Benign bx:  - Shave biopsy many yrs ago (R breast) -- inflamed SK (?)    # NUB R upper thigh - suspect DF  - punch biopsy today    Procedures Performed:   - Punch biopsy procedure note, location(s): R upper thigh. After discussion of benefits and risks including but not limited to bleeding, infection, scar, incomplete removal, recurrence, and non-diagnostic biopsy, written consent and photographs were obtained. The area was cleaned with isopropyl alcohol. 0.5mL of 1% lidocaine with epinephrine was injected to obtain adequate anesthesia and a  6 mm punch biopsy was  performed at site(s). 4-0 Prolene sutures were utilized to approximate the epidermal edges. White petrolatum ointment and a bandage was applied to the wound. Explicit verbal and written wound care instructions were provided. The patient left the dermatology clinic in good condition.     Follow-up: PRN for new or changing lesions    Staff and Medical student:   Jose Herman, MS4    Staffed by Dr. Sena    Staff Physician:  I was present with the medical student who participated in the service and in the documentation of the note. I have verified the history and personally performed the physical exam and medical decision making. I agree with the assessment and plan of care as documented in the note.     Winston Sena MD  Staff Dermatologist and Dermatopathologist  , Department of Dermatology   ____________________________________________    CC: Derm Problem (Skin growth on thigh, blue spot on R cheek, per patient/Has discoid lupus)    HPI:  Ms. Michelle Jalloh is a(n) 50 year old female who presents today as a new patient for evaluation of blue lesion on face (perhaps a bit darker in the middle), no itching, burning, pain. Also has a longstanding cystic bump on her R upper leg that she reports gets itchy occasionally, never drains, not painful. Finally, she has been diagnosed with discoid lupus in the past, affecting her L conchal bowl. She notes that she previously tried clobetasol solution which helped. Has not been treating it recently since she has not seen a dermatologist in many years.     No personal or family history of skin cancer.    Patient is otherwise feeling well, without additional skin concerns.    Labs Reviewed:  N/A    Physical Exam:  Vitals: There were no vitals taken for this visit.  SKIN: Focused examination of face, ears, R thigh was performed.  - No palpable or visible scale in bilateral ears, scalp, or face. Some brown freckling discoloration in bilateral conchal  bowls.  - There is a 5-6 mm firm tan/flesh colored papule that dimples with lateral pressure on the R upper thigh.   - There is a white 1-2 mm papule on the L nasal tip.  - No other lesions of concern on areas examined.     Medications:  Current Outpatient Medications   Medication Sig Dispense Refill     clobetasol (TEMOVATE) 0.05 % external solution Apply topically 2 times daily. Apply to ear rash area as needed 50 mL 3     estradiol (VIVELLE-DOT) 0.025 MG/24HR bi-weekly patch Place 1 patch onto the skin.       MULTIPLE VITAMIN PO Indications: Pt notes maybe [place] brand - PRN       Probiotic Product (PROBIOTIC DAILY PO) Take 1 capsule by mouth daily       progesterone (PROMETRIUM) 100 MG capsule Take 100 mg by mouth at bedtime.       No current facility-administered medications for this visit.      Past Medical History:   Patient Active Problem List   Diagnosis     Discoid lupus     Anxiety state     Papillary thyroid carcinoma (H)     History of partial thyroidectomy     Cervical adenopathy     Past Medical History:   Diagnosis Date     Acne vulgaris 2007     Adjustment disorder with depressed mood      Discoid lupus erythematosus 2012     History of partial thyroidectomy      Melasma 2007     Motion sickness      Papillary microcarcinoma of thyroid (H) 2017      No referring provider defined for this encounter. on close of this encounter.      Lidocaine-epinephrine 1-1:370873 % injection   1mL once for one use, starting 6/10/2025 ending 6/10/2025,  2mL disp, R-0, injection  Injected by ZACH Mora      Again, thank you for allowing me to participate in the care of your patient.      Sincerely,    Winston Sena MD

## 2025-06-10 NOTE — PROGRESS NOTES
[facility] Dermatology Note  Encounter Date: Faustino 10, 2025  Office Visit     Dermatology Problem List:  1. Discoid lupus (L conchal bowl)  - Previously prescribed clobetasol solution which helped   - Continue clobetasol as needed    2. Telangiectasia, R zygomatic cheek  - Discussed that this is a benign finding  - Referred to cosmetic derm for PDL    3. Dermatofibroma  - Punch biopsy today    4. Possible ganglion  - Recommend PCP orders an ultrasound of R forearm to r/o vascular lesion     # Benign bx:  - Shave biopsy many yrs ago (R breast) -- inflamed SK (?)    ____________________________________________    Assessment & Plan:    1. Discoid lupus (L conchal bowl)  - Previously prescribed clobetasol solution which helped   - Continue clobetasol as needed    2. Telangiectasia, R zygomatic cheek  - Discussed that this is a benign finding  - Referred to cosmetic derm for PDL    3. Dermatofibroma  - Punch biopsy today    4. Possible ganglion  - Recommend PCP orders an ultrasound of R forearm to r/o vascular lesion     # Benign bx:  - Shave biopsy many yrs ago (R breast) -- inflamed SK (?)    # NUB R upper thigh - suspect DF  - punch biopsy today    Procedures Performed:   - Punch biopsy procedure note, location(s): R upper thigh. After discussion of benefits and risks including but not limited to bleeding, infection, scar, incomplete removal, recurrence, and non-diagnostic biopsy, written consent and photographs were obtained. The area was cleaned with isopropyl alcohol. 0.5mL of 1% lidocaine with epinephrine was injected to obtain adequate anesthesia and a  6 mm punch biopsy was performed at site(s). 4-0 Prolene sutures were utilized to approximate the epidermal edges. White petrolatum ointment and a bandage was applied to the wound. Explicit verbal and written wound care instructions were provided. The patient left the dermatology clinic in good condition.     Follow-up: PRN for new or changing  lesions    Staff and Medical student:   Jose Herman, MS4    Staffed by Dr. Sena    Staff Physician:  I was present with the medical student who participated in the service and in the documentation of the note. I have verified the history and personally performed the physical exam and medical decision making. I agree with the assessment and plan of care as documented in the note.     Winston Sena MD  Staff Dermatologist and Dermatopathologist  , Department of Dermatology   ____________________________________________    CC: Derm Problem (Skin growth on thigh, blue spot on R cheek, per patient/Has discoid lupus)    HPI:  Ms. Michelle Jalloh is a(n) 50 year old female who presents today as a new patient for evaluation of blue lesion on face (perhaps a bit darker in the middle), no itching, burning, pain. Also has a longstanding cystic bump on her R upper leg that she reports gets itchy occasionally, never drains, not painful. Finally, she has been diagnosed with discoid lupus in the past, affecting her L conchal bowl. She notes that she previously tried clobetasol solution which helped. Has not been treating it recently since she has not seen a dermatologist in many years.     No personal or family history of skin cancer.    Patient is otherwise feeling well, without additional skin concerns.    Labs Reviewed:  N/A    Physical Exam:  Vitals: There were no vitals taken for this visit.  SKIN: Focused examination of face, ears, R thigh was performed.  - No palpable or visible scale in bilateral ears, scalp, or face. Some brown freckling discoloration in bilateral conchal bowls.  - There is a 5-6 mm firm tan/flesh colored papule that dimples with lateral pressure on the R upper thigh.   - There is a white 1-2 mm papule on the L nasal tip.  - No other lesions of concern on areas examined.     Medications:  Current Outpatient Medications   Medication Sig Dispense Refill    clobetasol  (TEMOVATE) 0.05 % external solution Apply topically 2 times daily. Apply to ear rash area as needed 50 mL 3    estradiol (VIVELLE-DOT) 0.025 MG/24HR bi-weekly patch Place 1 patch onto the skin.      MULTIPLE VITAMIN PO Indications: Pt notes maybe [place] brand - PRN      Probiotic Product (PROBIOTIC DAILY PO) Take 1 capsule by mouth daily      progesterone (PROMETRIUM) 100 MG capsule Take 100 mg by mouth at bedtime.       No current facility-administered medications for this visit.      Past Medical History:   Patient Active Problem List   Diagnosis    Discoid lupus    Anxiety state    Papillary thyroid carcinoma (H)    History of partial thyroidectomy    Cervical adenopathy     Past Medical History:   Diagnosis Date    Acne vulgaris 2007    Adjustment disorder with depressed mood     Discoid lupus erythematosus 2012    History of partial thyroidectomy     Melasma 2007    Motion sickness     Papillary microcarcinoma of thyroid (H) 2017     CC No referring provider defined for this encounter. on close of this encounter.

## 2025-06-10 NOTE — PROGRESS NOTES
Lidocaine-epinephrine 1-1:539370 % injection   1mL once for one use, starting 6/10/2025 ending 6/10/2025,  2mL disp, R-0, injection  Injected by ZACH Mora

## 2025-06-10 NOTE — NURSING NOTE
Dermatology Rooming Note    Michelle Jalloh's goals for this visit include:   Chief Complaint   Patient presents with    Derm Problem     Skin growth on thigh, blue spot on R cheek, per patient  Has discoid lupus     Margie Brandt LPN

## 2025-06-11 ENCOUNTER — PATIENT OUTREACH (OUTPATIENT)
Dept: CARE COORDINATION | Facility: CLINIC | Age: 50
End: 2025-06-11
Payer: COMMERCIAL

## 2025-06-11 LAB
PATH REPORT.COMMENTS IMP SPEC: NORMAL
PATH REPORT.COMMENTS IMP SPEC: NORMAL
PATH REPORT.FINAL DX SPEC: NORMAL
PATH REPORT.GROSS SPEC: NORMAL
PATH REPORT.MICROSCOPIC SPEC OTHER STN: NORMAL
PATH REPORT.RELEVANT HX SPEC: NORMAL

## 2025-06-17 ENCOUNTER — RESULTS FOLLOW-UP (OUTPATIENT)
Dept: DERMATOLOGY | Facility: CLINIC | Age: 50
End: 2025-06-17

## 2025-06-25 ENCOUNTER — ANCILLARY PROCEDURE (OUTPATIENT)
Dept: MAMMOGRAPHY | Facility: CLINIC | Age: 50
End: 2025-06-25
Attending: FAMILY MEDICINE
Payer: COMMERCIAL

## 2025-06-25 DIAGNOSIS — Z12.31 VISIT FOR SCREENING MAMMOGRAM: ICD-10-CM

## 2025-06-25 PROCEDURE — 77063 BREAST TOMOSYNTHESIS BI: CPT | Mod: TC | Performed by: RADIOLOGY

## 2025-06-25 PROCEDURE — 77067 SCR MAMMO BI INCL CAD: CPT | Mod: TC | Performed by: RADIOLOGY

## 2025-07-11 NOTE — PROGRESS NOTES
SUBJECTIVE:   CC: Michelle Jalloh is an 44 year old woman who presents for preventive health visit.     Healthy Habits:     Getting at least 3 servings of Calcium per day:  Yes    Bi-annual eye exam:  Yes    Dental care twice a year:  Yes    Sleep apnea or symptoms of sleep apnea:  None    Diet:  Regular (no restrictions)    Frequency of exercise:  2-3 days/week    Duration of exercise:  45-60 minutes    Taking medications regularly:  Yes    Medication side effects:  Not applicable    PHQ-2 Total Score: 0    Additional concerns today:  Yes              Today's PHQ-2 Score:   PHQ-2 ( 1999 Pfizer) 11/3/2019   Q1: Little interest or pleasure in doing things 0   Q2: Feeling down, depressed or hopeless 0   PHQ-2 Score 0   Q1: Little interest or pleasure in doing things Not at all   Q2: Feeling down, depressed or hopeless Not at all   PHQ-2 Score 0       Abuse: Current or Past(Physical, Sexual or Emotional)- no  Do you feel safe in your environment? Yes        Social History     Tobacco Use     Smoking status: Former Smoker     Start date: 1/29/1999     Smokeless tobacco: Never Used   Substance Use Topics     Alcohol use: Yes     Alcohol/week: 0.0 standard drinks     Comment: 1/week if that.     If you drink alcohol do you typically have >3 drinks per day or >7 drinks per week? No    Alcohol Use 11/6/2019   Prescreen: >3 drinks/day or >7 drinks/week? -   Prescreen: >3 drinks/day or >7 drinks/week? No       Reviewed orders with patient.  Reviewed health maintenance and updated orders accordingly - Yes  Lab work is in process  Labs reviewed in EPIC  BP Readings from Last 3 Encounters:   11/06/19 118/78   09/06/18 112/70   08/07/18 110/70    Wt Readings from Last 3 Encounters:   11/06/19 72.7 kg (160 lb 3.2 oz)   09/06/18 70.9 kg (156 lb 6.4 oz)   08/07/18 69.9 kg (154 lb)                  Patient Active Problem List   Diagnosis     CARDIOVASCULAR SCREENING; LDL GOAL LESS THAN 160     Family history of thyroid disease      Common wart     Lymph node enlargement     Discoid lupus     Nontoxic multinodular goiter     Anxiety state     Past Surgical History:   Procedure Laterality Date     APPENDECTOMY  18      SECTION       THYROIDECTOMY Right 2017    Procedure: THYROIDECTOMY;  Right Thyroid Lobectomy, Isthmysectomy;  Surgeon: Marisa Enamorado MD;  Location:  OR       Social History     Tobacco Use     Smoking status: Former Smoker     Start date: 1999     Smokeless tobacco: Never Used   Substance Use Topics     Alcohol use: Yes     Alcohol/week: 0.0 standard drinks     Comment: 1/week if that.     Family History   Problem Relation Age of Onset     Hypertension Father      Hyperthyroidism Mother         Thyroid     Hypertension Paternal Grandmother      Diabetes Paternal Grandfather      Colon Cancer No family hx of      Breast Cancer No family hx of      Thyroid Cancer No family hx of          No current outpatient medications on file.     No Known Allergies  Recent Labs   Lab Test 18  0939 17  0955  16  1101   LDL 95  --   --  86   HDL 76  --   --  78   TRIG 53  --   --  28   TSH 2.70 1.70  1.70   < > 1.22    < > = values in this interval not displayed.        Mammogram Screening: Patient under age 50, mutual decision reflected in health maintenance.      Pertinent mammograms are reviewed under the imaging tab.  History of abnormal Pap smear: NO - age 30-65 PAP every 5 years with negative HPV co-testing recommended  PAP / HPV Latest Ref Rng & Units 2018   PAP - NIL NIL NIL   HPV 16 DNA NEG:Negative Negative - -   HPV 18 DNA NEG:Negative Negative - -   OTHER HR HPV NEG:Negative Negative - -     Reviewed and updated as needed this visit by clinical staff  Tobacco  Allergies  Meds  Med Hx  Surg Hx  Fam Hx  Soc Hx        Reviewed and updated as needed this visit by Provider  Med Hx  Surg Hx  Fam Hx        Past Medical History:   Diagnosis Date     Motion sickness        Past Surgical History:   Procedure Laterality Date     APPENDECTOMY  18      SECTION       THYROIDECTOMY Right 2017    Procedure: THYROIDECTOMY;  Right Thyroid Lobectomy, Isthmysectomy;  Surgeon: Marisa Enamorado MD;  Location:  OR       Review of Systems   Constitutional: Negative for chills and fever.   HENT: Positive for congestion. Negative for ear pain, hearing loss and sore throat.    Eyes: Negative for pain and visual disturbance.   Respiratory: Negative for cough and shortness of breath.    Cardiovascular: Negative for chest pain, palpitations and peripheral edema.   Gastrointestinal: Negative for abdominal pain, constipation, diarrhea, heartburn, hematochezia and nausea.   Breasts:  Negative for tenderness, breast mass and discharge.   Genitourinary: Negative for dysuria, frequency, genital sores, hematuria, pelvic pain, urgency, vaginal bleeding and vaginal discharge.   Musculoskeletal: Negative for arthralgias, joint swelling and myalgias.   Skin: Negative for rash.   Neurological: Negative for dizziness, weakness, headaches and paresthesias.   Psychiatric/Behavioral: Negative for mood changes. The patient is not nervous/anxious.      CONSTITUTIONAL: NEGATIVE for fever, chills, change in weight  INTEGUMENTARU/SKIN: NEGATIVE for worrisome rashes, moles or lesions  EYES: NEGATIVE for vision changes or irritation  ENT: NEGATIVE for ear, mouth and throat problems  RESP: NEGATIVE for significant cough or SOB  BREAST: NEGATIVE for masses, tenderness or discharge  CV: NEGATIVE for chest pain, palpitations or peripheral edema  GI: NEGATIVE for nausea, abdominal pain, heartburn, or change in bowel habits  : NEGATIVE for unusual urinary or vaginal symptoms. Periods are regular.  MUSCULOSKELETAL: NEGATIVE for significant arthralgias or myalgia  NEURO: NEGATIVE for weakness, dizziness or paresthesias  PSYCHIATRIC: NEGATIVE for changes in mood or affect     OBJECTIVE:   /78   Pulse  "52   Temp 97.8  F (36.6  C) (Oral)   Resp 16   Ht 1.626 m (5' 4\")   Wt 72.7 kg (160 lb 3.2 oz)   LMP 10/26/2019 (Approximate)   SpO2 99%   BMI 27.50 kg/m    Physical Exam  GENERAL: healthy, alert and no distress  EYES: Eyes grossly normal to inspection, PERRL and conjunctivae and sclerae normal  HENT: ear canals and TM's normal, nose and mouth without ulcers or lesions  NECK: no adenopathy, no asymmetry, masses, or scars and thyroid normal to palpation  RESP: lungs clear to auscultation - no rales, rhonchi or wheezes  BREAST: normal without masses, tenderness or nipple discharge and no palpable axillary masses or adenopathy  CV: regular rate and rhythm, normal S1 S2, no S3 or S4, no murmur, click or rub, no peripheral edema and peripheral pulses strong  ABDOMEN: soft, nontender, no hepatosplenomegaly, no masses and bowel sounds normal  MS: no gross musculoskeletal defects noted, no edema  SKIN: no suspicious lesions or rashes  NEURO: Normal strength and tone, mentation intact and speech normal  PSYCH: mentation appears normal, affect normal/bright    Diagnostic Test Results:  Labs reviewed in Epic  Pen d    ASSESSMENT/PLAN:   1. Routine history and physical examination of adult    - Glucose    2. Family history of thyroid disease  Pending TSH    3. Discoid lupus  Stable     4. Nontoxic multinodular goiter    - TSH with free T4 reflex    5. Special screening for malignant neoplasms, colon  Advised   - Fecal colorectal cancer screen (FIT); Future    COUNSELING:  Reviewed preventive health counseling, as reflected in patient instructions       Regular exercise       Healthy diet/nutrition       Vision screening       Hearing screening       Contraception       Osteoporosis Prevention/Bone Health       Colon cancer screening       HIV screeninx in teen years, 1x in adult years, and at intervals if high risk       The 10-year ASCVD risk score (Maura NOHEMI Jr., et al., 2013) is: 0.4%    Values used to calculate the " "score:      Age: 44 years      Sex: Female      Is Non- : No      Diabetic: No      Tobacco smoker: No      Systolic Blood Pressure: 118 mmHg      Is BP treated: No      HDL Cholesterol: 76 mg/dL      Total Cholesterol: 182 mg/dL    Estimated body mass index is 27.5 kg/m  as calculated from the following:    Height as of this encounter: 1.626 m (5' 4\").    Weight as of this encounter: 72.7 kg (160 lb 3.2 oz).    Weight management plan: Discussed healthy diet and exercise guidelines     reports that she has quit smoking. She started smoking about 20 years ago. She has never used smokeless tobacco.      Counseling Resources:  ATP IV Guidelines  Pooled Cohorts Equation Calculator  Breast Cancer Risk Calculator  FRAX Risk Assessment  ICSI Preventive Guidelines  Dietary Guidelines for Americans, 2010  USDA's MyPlate  ASA Prophylaxis  Lung CA Screening    Bijal Jaimes MD  [facility]  " 36.6

## (undated) DEVICE — ESU GROUND PAD ADULT W/CORD E7507

## (undated) DEVICE — GOWN IMPERVIOUS 2XL BLUE

## (undated) DEVICE — PREP CHLORAPREP W/ORANGE TINT 10.5ML 260715

## (undated) DEVICE — NIM PROBE PRASS INCREMENTING TIP 8225825

## (undated) DEVICE — SOL WATER IRRIG 500ML BOTTLE 2F7113

## (undated) DEVICE — GLOVE PROTEXIS W/NEU-THERA 6.5  2D73TE65

## (undated) DEVICE — LINEN TOWEL PACK X5 5464

## (undated) DEVICE — ESU PENCIL SMOKE EVAC W/ROCKER SWITCH 0703-047-000

## (undated) DEVICE — SUCTION MANIFOLD NEPTUNE 2 SYS 1 PORT 702-025-000

## (undated) DEVICE — ESU ELEC BLADE 2.75" COATED/INSULATED E1455

## (undated) DEVICE — SPECIMEN CONTAINER 3OZ W/FORMALIN 59901

## (undated) DEVICE — SU CHROMIC 3-0 SH 27" G122H

## (undated) DEVICE — SU MONOCRYL 5-0 P-3 18" UND Y493G

## (undated) DEVICE — SU SILK 3-0 TIE 12X30" A304H

## (undated) DEVICE — ADHESIVE SWIFTSET 0.8ML OCTYL SS6

## (undated) DEVICE — SUCTION SLEEVE NEPTUNE 2 125MM 0703-005-125

## (undated) DEVICE — KIT ENDO FIRST STEP DISINFECTANT 200ML W/POUCH EP-4

## (undated) DEVICE — PACK ENT MINOR CUSTOM ASC

## (undated) DEVICE — CLIP HORIZON SM RED WIDE SLOT 001201

## (undated) DEVICE — TUBING SUCTION 12"X1/4" N612

## (undated) DEVICE — KIT ENDO TURNOVER/PROCEDURE CARRY-ON 101822

## (undated) DEVICE — SOL NACL 0.9% IRRIG 500ML BOTTLE 2F7123

## (undated) DEVICE — ESU LIGASURE OPEN SEALER/DIVIDER SM JAW 16.5MM LF1212A

## (undated) DEVICE — CLIP HORIZON MED BLUE 002200

## (undated) DEVICE — SNARE CAPIVATOR ROUND COLD SNR BX10 M00561101

## (undated) DEVICE — SPECIMEN CONTAINER URINE 90ML STERILE 75.1435.002

## (undated) RX ORDER — PROPOFOL 10 MG/ML
INJECTION, EMULSION INTRAVENOUS
Status: DISPENSED
Start: 2017-06-27

## (undated) RX ORDER — KETOROLAC TROMETHAMINE 30 MG/ML
INJECTION, SOLUTION INTRAMUSCULAR; INTRAVENOUS
Status: DISPENSED
Start: 2017-06-27

## (undated) RX ORDER — ONDANSETRON 2 MG/ML
INJECTION INTRAMUSCULAR; INTRAVENOUS
Status: DISPENSED
Start: 2017-06-27

## (undated) RX ORDER — GLYCOPYRROLATE 0.2 MG/ML
INJECTION INTRAMUSCULAR; INTRAVENOUS
Status: DISPENSED
Start: 2017-06-27

## (undated) RX ORDER — FENTANYL CITRATE 50 UG/ML
INJECTION, SOLUTION INTRAMUSCULAR; INTRAVENOUS
Status: DISPENSED
Start: 2017-06-27

## (undated) RX ORDER — GABAPENTIN 300 MG/1
CAPSULE ORAL
Status: DISPENSED
Start: 2017-06-27

## (undated) RX ORDER — DEXAMETHASONE SODIUM PHOSPHATE 10 MG/ML
INJECTION, SOLUTION INTRAMUSCULAR; INTRAVENOUS
Status: DISPENSED
Start: 2017-06-27

## (undated) RX ORDER — ACETAMINOPHEN 325 MG/1
TABLET ORAL
Status: DISPENSED
Start: 2017-06-27